# Patient Record
Sex: FEMALE | Race: WHITE | NOT HISPANIC OR LATINO | Employment: PART TIME | URBAN - METROPOLITAN AREA
[De-identification: names, ages, dates, MRNs, and addresses within clinical notes are randomized per-mention and may not be internally consistent; named-entity substitution may affect disease eponyms.]

---

## 2021-08-09 ENCOUNTER — OFFICE VISIT (OUTPATIENT)
Dept: INTERNAL MEDICINE | Facility: CLINIC | Age: 51
End: 2021-08-09

## 2021-08-09 ENCOUNTER — LAB (OUTPATIENT)
Dept: LAB | Facility: HOSPITAL | Age: 51
End: 2021-08-09

## 2021-08-09 ENCOUNTER — TELEPHONE (OUTPATIENT)
Dept: INTERNAL MEDICINE | Facility: CLINIC | Age: 51
End: 2021-08-09

## 2021-08-09 VITALS
BODY MASS INDEX: 34.55 KG/M2 | HEIGHT: 61 IN | SYSTOLIC BLOOD PRESSURE: 114 MMHG | HEART RATE: 73 BPM | WEIGHT: 183 LBS | RESPIRATION RATE: 14 BRPM | DIASTOLIC BLOOD PRESSURE: 72 MMHG | OXYGEN SATURATION: 99 %

## 2021-08-09 DIAGNOSIS — Z13.89 ENCOUNTER FOR SCREENING FOR OTHER DISORDER: ICD-10-CM

## 2021-08-09 DIAGNOSIS — D50.8 OTHER IRON DEFICIENCY ANEMIA: ICD-10-CM

## 2021-08-09 DIAGNOSIS — E66.9 OBESITY (BMI 30-39.9): ICD-10-CM

## 2021-08-09 DIAGNOSIS — Z13.220 ENCOUNTER FOR SCREENING FOR LIPID DISORDER: ICD-10-CM

## 2021-08-09 DIAGNOSIS — E53.8 B12 DEFICIENCY: ICD-10-CM

## 2021-08-09 DIAGNOSIS — Z00.00 HEALTHCARE MAINTENANCE: Primary | ICD-10-CM

## 2021-08-09 DIAGNOSIS — Z00.00 HEALTHCARE MAINTENANCE: ICD-10-CM

## 2021-08-09 LAB
25(OH)D3 SERPL-MCNC: 31.2 NG/ML (ref 30–100)
ALBUMIN SERPL-MCNC: 4.1 G/DL (ref 3.5–5.2)
ALBUMIN/GLOB SERPL: 2.1 G/DL
ALP SERPL-CCNC: 48 U/L (ref 39–117)
ALT SERPL W P-5'-P-CCNC: 5 U/L (ref 1–33)
ANION GAP SERPL CALCULATED.3IONS-SCNC: 8.3 MMOL/L (ref 5–15)
AST SERPL-CCNC: 14 U/L (ref 1–32)
BACTERIA UR QL AUTO: ABNORMAL /HPF
BILIRUB SERPL-MCNC: 0.4 MG/DL (ref 0–1.2)
BILIRUB UR QL STRIP: NEGATIVE
BUN SERPL-MCNC: 12 MG/DL (ref 6–20)
BUN/CREAT SERPL: 17.6 (ref 7–25)
CALCIUM SPEC-SCNC: 8.8 MG/DL (ref 8.6–10.5)
CHLORIDE SERPL-SCNC: 107 MMOL/L (ref 98–107)
CHOLEST SERPL-MCNC: 202 MG/DL (ref 0–200)
CLARITY UR: CLEAR
CO2 SERPL-SCNC: 24.7 MMOL/L (ref 22–29)
COLOR UR: YELLOW
CREAT SERPL-MCNC: 0.68 MG/DL (ref 0.57–1)
DEPRECATED RDW RBC AUTO: 41.5 FL (ref 37–54)
ERYTHROCYTE [DISTWIDTH] IN BLOOD BY AUTOMATED COUNT: 13 % (ref 12.3–15.4)
FERRITIN SERPL-MCNC: 10.1 NG/ML (ref 13–150)
FOLATE SERPL-MCNC: 4.98 NG/ML (ref 4.78–24.2)
GFR SERPL CREATININE-BSD FRML MDRD: 91 ML/MIN/1.73
GLOBULIN UR ELPH-MCNC: 2 GM/DL
GLUCOSE SERPL-MCNC: 94 MG/DL (ref 65–99)
GLUCOSE UR STRIP-MCNC: NEGATIVE MG/DL
HBA1C MFR BLD: 4.96 % (ref 4.8–5.6)
HCT VFR BLD AUTO: 37.3 % (ref 34–46.6)
HCV AB SER DONR QL: NORMAL
HDLC SERPL QL: 2.89
HDLC SERPL-MCNC: 70 MG/DL (ref 40–60)
HGB BLD-MCNC: 12.2 G/DL (ref 12–15.9)
HGB UR QL STRIP.AUTO: NEGATIVE
HYALINE CASTS UR QL AUTO: ABNORMAL /LPF
IRON 24H UR-MRATE: 69 MCG/DL (ref 37–145)
IRON SATN MFR SERPL: 15 % (ref 20–50)
KETONES UR QL STRIP: NEGATIVE
LDLC SERPL CALC-MCNC: 123 MG/DL (ref 0–100)
LEUKOCYTE ESTERASE UR QL STRIP.AUTO: ABNORMAL
MCH RBC QN AUTO: 29 PG (ref 26.6–33)
MCHC RBC AUTO-ENTMCNC: 32.7 G/DL (ref 31.5–35.7)
MCV RBC AUTO: 88.6 FL (ref 79–97)
NITRITE UR QL STRIP: NEGATIVE
PH UR STRIP.AUTO: 6 [PH] (ref 5–8)
PLATELET # BLD AUTO: 318 10*3/MM3 (ref 140–450)
PMV BLD AUTO: 10.4 FL (ref 6–12)
POTASSIUM SERPL-SCNC: 4.5 MMOL/L (ref 3.5–5.2)
PROT SERPL-MCNC: 6.1 G/DL (ref 6–8.5)
PROT UR QL STRIP: NEGATIVE
RBC # BLD AUTO: 4.21 10*6/MM3 (ref 3.77–5.28)
RBC # UR: ABNORMAL /HPF
REF LAB TEST METHOD: ABNORMAL
SODIUM SERPL-SCNC: 140 MMOL/L (ref 136–145)
SP GR UR STRIP: 1.01 (ref 1–1.03)
SQUAMOUS #/AREA URNS HPF: ABNORMAL /HPF
TIBC SERPL-MCNC: 466 MCG/DL (ref 298–536)
TRANSFERRIN SERPL-MCNC: 313 MG/DL (ref 200–360)
TRIGL SERPL-MCNC: 47 MG/DL (ref 0–150)
TSH SERPL DL<=0.05 MIU/L-ACNC: 2.27 UIU/ML (ref 0.27–4.2)
UROBILINOGEN UR QL STRIP: ABNORMAL
VIT B12 BLD-MCNC: 550 PG/ML (ref 211–946)
VLDLC SERPL-MCNC: 9 MG/DL (ref 5–40)
WBC # BLD AUTO: 6.5 10*3/MM3 (ref 3.4–10.8)
WBC UR QL AUTO: ABNORMAL /HPF

## 2021-08-09 PROCEDURE — 80061 LIPID PANEL: CPT | Performed by: FAMILY MEDICINE

## 2021-08-09 PROCEDURE — 81001 URINALYSIS AUTO W/SCOPE: CPT

## 2021-08-09 PROCEDURE — 84443 ASSAY THYROID STIM HORMONE: CPT | Performed by: FAMILY MEDICINE

## 2021-08-09 PROCEDURE — 82306 VITAMIN D 25 HYDROXY: CPT | Performed by: FAMILY MEDICINE

## 2021-08-09 PROCEDURE — 82728 ASSAY OF FERRITIN: CPT

## 2021-08-09 PROCEDURE — 84466 ASSAY OF TRANSFERRIN: CPT | Performed by: FAMILY MEDICINE

## 2021-08-09 PROCEDURE — 85027 COMPLETE CBC AUTOMATED: CPT | Performed by: FAMILY MEDICINE

## 2021-08-09 PROCEDURE — 82607 VITAMIN B-12: CPT | Performed by: FAMILY MEDICINE

## 2021-08-09 PROCEDURE — 80053 COMPREHEN METABOLIC PANEL: CPT | Performed by: FAMILY MEDICINE

## 2021-08-09 PROCEDURE — 82746 ASSAY OF FOLIC ACID SERUM: CPT | Performed by: FAMILY MEDICINE

## 2021-08-09 PROCEDURE — 83036 HEMOGLOBIN GLYCOSYLATED A1C: CPT | Performed by: FAMILY MEDICINE

## 2021-08-09 PROCEDURE — 83540 ASSAY OF IRON: CPT | Performed by: FAMILY MEDICINE

## 2021-08-09 PROCEDURE — 99386 PREV VISIT NEW AGE 40-64: CPT | Performed by: FAMILY MEDICINE

## 2021-08-09 PROCEDURE — 86803 HEPATITIS C AB TEST: CPT | Performed by: FAMILY MEDICINE

## 2021-08-09 PROCEDURE — 36415 COLL VENOUS BLD VENIPUNCTURE: CPT | Performed by: FAMILY MEDICINE

## 2021-08-09 RX ORDER — DIPHENOXYLATE HYDROCHLORIDE AND ATROPINE SULFATE 2.5; .025 MG/1; MG/1
TABLET ORAL
COMMUNITY

## 2021-08-09 RX ORDER — CYANOCOBALAMIN 1000 UG/ML
1000 INJECTION, SOLUTION INTRAMUSCULAR; SUBCUTANEOUS WEEKLY
Qty: 51 ML | Refills: 0 | Status: SHIPPED | OUTPATIENT
Start: 2021-08-09 | End: 2022-05-16

## 2021-08-09 RX ORDER — DOXYCYCLINE HYCLATE 50 MG/1
324 CAPSULE, GELATIN COATED ORAL
Qty: 90 TABLET | Refills: 1 | Status: SHIPPED | OUTPATIENT
Start: 2021-08-09

## 2021-08-09 RX ORDER — DOXYCYCLINE HYCLATE 50 MG/1
324 CAPSULE, GELATIN COATED ORAL
Qty: 90 TABLET | Refills: 4 | Status: SHIPPED | OUTPATIENT
Start: 2021-08-09 | End: 2021-08-09 | Stop reason: SDUPTHER

## 2021-08-09 RX ORDER — MECLIZINE HYDROCHLORIDE 25 MG/1
25 TABLET ORAL 3 TIMES DAILY PRN
COMMUNITY
End: 2022-05-16

## 2021-08-09 RX ORDER — CYANOCOBALAMIN 1000 UG/ML
1000 INJECTION, SOLUTION INTRAMUSCULAR; SUBCUTANEOUS WEEKLY
Qty: 51 ML | Refills: 0 | Status: SHIPPED | OUTPATIENT
Start: 2021-08-09 | End: 2021-08-09 | Stop reason: SDUPTHER

## 2021-08-09 NOTE — TELEPHONE ENCOUNTER
PATIENT STATES THAT SHE GAVE THE INCORRECT PHARMACY AT HE VISIT AND NEEDS THE MEDICATIONS THAT WERE SENT  IN TODAY TO GO TO       [id number] - [place], [place] - [address] AT [facility] - [phone] [facility] [phone]   [phone]

## 2021-08-09 NOTE — PROGRESS NOTES
08/09/2021    Patient Information  Kiera Avalos                                                                                          [address] [facility] [address]      Chief Complaint:   Chief Complaint   Patient presents with   • Annual Exam          History of Present Illness:  Pt is here for obesity. She has hx of gastric bypass. reports she has been unsuccessful loosing any weight this past year despite exercise/diet. She has gained 30lbs this past year.   Iron def--pt not taking her iron supplements/vit c. Has previously needed iron transfusions since her gastric bypass. Also has been b12 def since surgery, not on b12 supplements/injections.  Pt not requesting refills/side effects. No further concerns/complaints.    Review of Systems   Constitutional: Positive for malaise/fatigue and weight gain.   HENT: Negative.    Eyes: Negative.    Cardiovascular: Negative.    Respiratory: Negative.    Endocrine: Negative.    Hematologic/Lymphatic: Negative.    Skin: Negative.    Musculoskeletal: Negative.    Gastrointestinal: Negative.    Genitourinary: Negative.    Neurological: Negative.    Psychiatric/Behavioral: Negative.    Allergic/Immunologic: Negative.        Active Problems:    There is no problem list on file for this patient.        Past Medical History:   Diagnosis Date   • Anemia          No past surgical history on file.      Allergies   Allergen Reactions   • Penicillins Hives   • Sulfa Antibiotics Rash           Current Outpatient Medications:   •  esomeprazole (nexIUM) 20 MG capsule, Take 20 mg by mouth Every Morning Before Breakfast., Disp: , Rfl:   •  meclizine (ANTIVERT) 25 MG tablet, Take 25 mg by mouth 3 (Three) Times a Day As Needed for Dizziness., Disp: , Rfl:   •  multivitamin (THERAGRAN) tablet tablet, Take  by mouth., Disp: , Rfl:       No family history on file.      Social History     Socioeconomic History   • Marital status:      Spouse name: Not on file   •  "Number of children: Not on file   • Years of education: Not on file   • Highest education level: Not on file   Tobacco Use   • Smoking status: Never Smoker   • Smokeless tobacco: Never Used   Vaping Use   • Vaping Use: Never used   Substance and Sexual Activity   • Alcohol use: Yes   • Drug use: Never   • Sexual activity: Yes         Physical Exam  Constitutional:       Appearance: Normal appearance. She is obese.   Cardiovascular:      Rate and Rhythm: Normal rate and regular rhythm.      Pulses: Normal pulses.      Heart sounds: Normal heart sounds.   Pulmonary:      Effort: Pulmonary effort is normal.      Breath sounds: Normal breath sounds.   Abdominal:      General: Abdomen is flat. Bowel sounds are normal. There is no distension.      Palpations: Abdomen is soft. There is no mass.      Tenderness: There is no abdominal tenderness.   Skin:     General: Skin is warm.      Findings: No rash.   Neurological:      General: No focal deficit present.      Mental Status: She is alert and oriented to person, place, and time. Mental status is at baseline.   Psychiatric:         Mood and Affect: Mood normal.         Behavior: Behavior normal.         Thought Content: Thought content normal.         Judgment: Judgment normal.          Vitals:    08/09/21 0846   BP: 114/72   Pulse: 73   Resp: 14   SpO2: 99%   Weight: 83 kg (183 lb)   Height: 154.9 cm (61\")       Body mass index is 34.58 kg/m².       Lab/other results:        Assessment/Plan:    Diagnoses and all orders for this visit:    1. Healthcare maintenance (Primary)  -     CBC (No Diff)  -     Comprehensive Metabolic Panel  -     Hemoglobin A1c  -     Lipid Panel With / Chol / HDL Ratio  -     Hepatitis C Antibody  -     TSH  -     Vitamin B12  -     Folate  -     Vitamin D 25 Hydroxy  -     Urinalysis With Culture If Indicated -; Future  -     Ambulatory Referral to Gastroenterology    2. Encounter for screening for lipid disorder  -     Lipid Panel With / Chol / " HDL Ratio    3. Encounter for screening for other disorder  -     CBC (No Diff)  -     Comprehensive Metabolic Panel  -     Hemoglobin A1c  -     Lipid Panel With / Chol / HDL Ratio  -     Hepatitis C Antibody  -     TSH  -     Vitamin B12  -     Folate  -     Vitamin D 25 Hydroxy  -     Urinalysis With Culture If Indicated -; Future  -     Ambulatory Referral to Gastroenterology    4. Obesity (BMI 30-39.9)    5. Other iron deficiency anemia  -     Iron Profile  -     Ferritin; Future  -     ferrous gluconate (FERGON) 324 MG tablet; Take 1 tablet by mouth Daily With Breakfast.  Dispense: 90 tablet; Refill: 4    6. B12 deficiency  -     cyanocobalamin 1000 MCG/ML injection; Inject 1 mL into the appropriate muscle as directed by prescriber 1 (One) Time Per Week.  Dispense: 51 mL; Refill: 0    hm--refusing vaccines , mammo/pap smear utd, next mammo due next yr, pap smear due in 2022. F/u labs, f/u gi for colonoscopy. F/u in 1 yr routine annual visit.    iron def--ferrous gluconate , vit c, fiber + stool softenenrs for any constipation, b12 oral + IM (if insurance covers). monitor labs. If not appropriately improving after 1 month will refer to Heme for possible infusions. F/u in 1 month.    obesity--counseled on healthy diet/exercise. counseling on phentermine given to pt. She is interested but will confirm with insurance if this is covered. Monitor weight.              Procedures

## 2021-08-10 RX ORDER — ERGOCALCIFEROL 1.25 MG/1
50000 CAPSULE ORAL WEEKLY
Qty: 12 CAPSULE | Refills: 0 | Status: SHIPPED | OUTPATIENT
Start: 2021-08-10 | End: 2022-05-16

## 2021-08-23 ENCOUNTER — TELEPHONE (OUTPATIENT)
Dept: GASTROENTEROLOGY | Facility: CLINIC | Age: 51
End: 2021-08-23

## 2022-05-16 ENCOUNTER — OFFICE VISIT (OUTPATIENT)
Dept: INTERNAL MEDICINE | Facility: CLINIC | Age: 52
End: 2022-05-16

## 2022-05-16 ENCOUNTER — LAB (OUTPATIENT)
Dept: LAB | Facility: HOSPITAL | Age: 52
End: 2022-05-16

## 2022-05-16 VITALS
HEART RATE: 64 BPM | WEIGHT: 176.8 LBS | BODY MASS INDEX: 33.38 KG/M2 | HEIGHT: 61 IN | DIASTOLIC BLOOD PRESSURE: 82 MMHG | TEMPERATURE: 98.3 F | SYSTOLIC BLOOD PRESSURE: 130 MMHG | OXYGEN SATURATION: 98 %

## 2022-05-16 DIAGNOSIS — R00.2 INTERMITTENT PALPITATIONS: ICD-10-CM

## 2022-05-16 DIAGNOSIS — Z01.419 ENCOUNTER FOR WELL WOMAN EXAM WITH ROUTINE GYNECOLOGICAL EXAM: ICD-10-CM

## 2022-05-16 DIAGNOSIS — E53.8 VITAMIN B12 DEFICIENCY: ICD-10-CM

## 2022-05-16 DIAGNOSIS — Z12.11 SCREEN FOR COLON CANCER: ICD-10-CM

## 2022-05-16 DIAGNOSIS — D17.1 LIPOMA OF BACK: ICD-10-CM

## 2022-05-16 DIAGNOSIS — E55.9 VITAMIN D DEFICIENCY: ICD-10-CM

## 2022-05-16 DIAGNOSIS — D50.9 IRON DEFICIENCY ANEMIA, UNSPECIFIED IRON DEFICIENCY ANEMIA TYPE: ICD-10-CM

## 2022-05-16 DIAGNOSIS — M25.50 ARTHRALGIA OF MULTIPLE JOINTS: ICD-10-CM

## 2022-05-16 DIAGNOSIS — Z80.49 FAMILY HISTORY OF CERVICAL CANCER: ICD-10-CM

## 2022-05-16 DIAGNOSIS — Z82.61 FAMILY HISTORY OF RHEUMATOID ARTHRITIS: ICD-10-CM

## 2022-05-16 DIAGNOSIS — M79.89 BILATERAL HAND SWELLING: ICD-10-CM

## 2022-05-16 DIAGNOSIS — R60.0 BILATERAL LEG EDEMA: Primary | ICD-10-CM

## 2022-05-16 DIAGNOSIS — K59.09 CHRONIC CONSTIPATION: ICD-10-CM

## 2022-05-16 DIAGNOSIS — I83.893 VARICOSE VEINS OF BOTH LEGS WITH EDEMA: ICD-10-CM

## 2022-05-16 DIAGNOSIS — Z98.84 HISTORY OF BARIATRIC SURGERY: ICD-10-CM

## 2022-05-16 PROBLEM — R56.9 SEIZURE: Status: ACTIVE | Noted: 2020-02-20

## 2022-05-16 PROBLEM — K21.9 GASTROESOPHAGEAL REFLUX DISEASE: Status: ACTIVE | Noted: 2020-02-20

## 2022-05-16 LAB
25(OH)D3 SERPL-MCNC: 47.9 NG/ML (ref 30–100)
ALBUMIN SERPL-MCNC: 4.4 G/DL (ref 3.5–5.2)
ALBUMIN/GLOB SERPL: 1.8 G/DL
ALP SERPL-CCNC: 68 U/L (ref 39–117)
ALT SERPL W P-5'-P-CCNC: 9 U/L (ref 1–33)
ANION GAP SERPL CALCULATED.3IONS-SCNC: 7.8 MMOL/L (ref 5–15)
AST SERPL-CCNC: 16 U/L (ref 1–32)
BASOPHILS # BLD AUTO: 0.06 10*3/MM3 (ref 0–0.2)
BASOPHILS NFR BLD AUTO: 1.1 % (ref 0–1.5)
BILIRUB SERPL-MCNC: 0.5 MG/DL (ref 0–1.2)
BUN SERPL-MCNC: 16 MG/DL (ref 6–20)
BUN/CREAT SERPL: 21.3 (ref 7–25)
CALCIUM SPEC-SCNC: 9.5 MG/DL (ref 8.6–10.5)
CHLORIDE SERPL-SCNC: 102 MMOL/L (ref 98–107)
CHOLEST SERPL-MCNC: 227 MG/DL (ref 0–200)
CHROMATIN AB SERPL-ACNC: <10 IU/ML (ref 0–14)
CO2 SERPL-SCNC: 26.2 MMOL/L (ref 22–29)
CREAT SERPL-MCNC: 0.75 MG/DL (ref 0.57–1)
CRP SERPL-MCNC: <0.3 MG/DL (ref 0–0.5)
DEPRECATED RDW RBC AUTO: 46.6 FL (ref 37–54)
EGFRCR SERPLBLD CKD-EPI 2021: 95.3 ML/MIN/1.73
EOSINOPHIL # BLD AUTO: 0.06 10*3/MM3 (ref 0–0.4)
EOSINOPHIL NFR BLD AUTO: 1.1 % (ref 0.3–6.2)
ERYTHROCYTE [DISTWIDTH] IN BLOOD BY AUTOMATED COUNT: 12.6 % (ref 12.3–15.4)
FERRITIN SERPL-MCNC: 23.4 NG/ML (ref 13–150)
FOLATE SERPL-MCNC: 10.9 NG/ML (ref 4.78–24.2)
GLOBULIN UR ELPH-MCNC: 2.5 GM/DL
GLUCOSE SERPL-MCNC: 94 MG/DL (ref 65–99)
HCT VFR BLD AUTO: 46.3 % (ref 34–46.6)
HDLC SERPL-MCNC: 73 MG/DL (ref 40–60)
HGB BLD-MCNC: 14.1 G/DL (ref 12–15.9)
IMM GRANULOCYTES # BLD AUTO: 0.01 10*3/MM3 (ref 0–0.05)
IMM GRANULOCYTES NFR BLD AUTO: 0.2 % (ref 0–0.5)
IRON 24H UR-MRATE: 128 MCG/DL (ref 37–145)
IRON SATN MFR SERPL: 27 % (ref 20–50)
LDLC SERPL CALC-MCNC: 141 MG/DL (ref 0–100)
LDLC/HDLC SERPL: 1.9 {RATIO}
LYMPHOCYTES # BLD AUTO: 1.99 10*3/MM3 (ref 0.7–3.1)
LYMPHOCYTES NFR BLD AUTO: 37.5 % (ref 19.6–45.3)
MAGNESIUM SERPL-MCNC: 2.1 MG/DL (ref 1.6–2.6)
MCH RBC QN AUTO: 30.5 PG (ref 26.6–33)
MCHC RBC AUTO-ENTMCNC: 30.5 G/DL (ref 31.5–35.7)
MCV RBC AUTO: 100 FL (ref 79–97)
MONOCYTES # BLD AUTO: 0.59 10*3/MM3 (ref 0.1–0.9)
MONOCYTES NFR BLD AUTO: 11.1 % (ref 5–12)
NEUTROPHILS NFR BLD AUTO: 2.59 10*3/MM3 (ref 1.7–7)
NEUTROPHILS NFR BLD AUTO: 49 % (ref 42.7–76)
NRBC BLD AUTO-RTO: 0 /100 WBC (ref 0–0.2)
NT-PROBNP SERPL-MCNC: 118 PG/ML (ref 0–900)
PLATELET # BLD AUTO: 278 10*3/MM3 (ref 140–450)
PMV BLD AUTO: 9.8 FL (ref 6–12)
POTASSIUM SERPL-SCNC: 4.2 MMOL/L (ref 3.5–5.2)
PROT SERPL-MCNC: 6.9 G/DL (ref 6–8.5)
RBC # BLD AUTO: 4.63 10*6/MM3 (ref 3.77–5.28)
SODIUM SERPL-SCNC: 136 MMOL/L (ref 136–145)
TIBC SERPL-MCNC: 468 MCG/DL (ref 298–536)
TRANSFERRIN SERPL-MCNC: 314 MG/DL (ref 200–360)
TRIGL SERPL-MCNC: 76 MG/DL (ref 0–150)
TSH SERPL DL<=0.05 MIU/L-ACNC: 1.58 UIU/ML (ref 0.27–4.2)
VIT B12 BLD-MCNC: 721 PG/ML (ref 211–946)
VLDLC SERPL-MCNC: 13 MG/DL (ref 5–40)
WBC NRBC COR # BLD: 5.3 10*3/MM3 (ref 3.4–10.8)

## 2022-05-16 PROCEDURE — 86140 C-REACTIVE PROTEIN: CPT | Performed by: NURSE PRACTITIONER

## 2022-05-16 PROCEDURE — 82306 VITAMIN D 25 HYDROXY: CPT | Performed by: NURSE PRACTITIONER

## 2022-05-16 PROCEDURE — 82607 VITAMIN B-12: CPT | Performed by: NURSE PRACTITIONER

## 2022-05-16 PROCEDURE — 82728 ASSAY OF FERRITIN: CPT | Performed by: NURSE PRACTITIONER

## 2022-05-16 PROCEDURE — 86431 RHEUMATOID FACTOR QUANT: CPT | Performed by: NURSE PRACTITIONER

## 2022-05-16 PROCEDURE — 86038 ANTINUCLEAR ANTIBODIES: CPT | Performed by: NURSE PRACTITIONER

## 2022-05-16 PROCEDURE — 83540 ASSAY OF IRON: CPT | Performed by: NURSE PRACTITIONER

## 2022-05-16 PROCEDURE — 83735 ASSAY OF MAGNESIUM: CPT | Performed by: NURSE PRACTITIONER

## 2022-05-16 PROCEDURE — 36415 COLL VENOUS BLD VENIPUNCTURE: CPT | Performed by: NURSE PRACTITIONER

## 2022-05-16 PROCEDURE — 99214 OFFICE O/P EST MOD 30 MIN: CPT | Performed by: NURSE PRACTITIONER

## 2022-05-16 PROCEDURE — 80050 GENERAL HEALTH PANEL: CPT | Performed by: NURSE PRACTITIONER

## 2022-05-16 PROCEDURE — 84466 ASSAY OF TRANSFERRIN: CPT | Performed by: NURSE PRACTITIONER

## 2022-05-16 PROCEDURE — 83880 ASSAY OF NATRIURETIC PEPTIDE: CPT | Performed by: NURSE PRACTITIONER

## 2022-05-16 PROCEDURE — 80061 LIPID PANEL: CPT | Performed by: NURSE PRACTITIONER

## 2022-05-16 PROCEDURE — 93000 ELECTROCARDIOGRAM COMPLETE: CPT | Performed by: NURSE PRACTITIONER

## 2022-05-16 PROCEDURE — 82746 ASSAY OF FOLIC ACID SERUM: CPT | Performed by: NURSE PRACTITIONER

## 2022-05-16 NOTE — PROGRESS NOTES
Subjective   Kiera Avalos is a 51 y.o. female.     Chief Complaint   Patient presents with   • Leg Swelling     Pt is here as a new pt to est care. Pt c/o bilateral leg swelling X 2 months.   • Weight Gain   • Menstrual Problem   • Hot Flashes        History of Present Illness   She is here today as a new patient to establish care. She tries to stay active daily and eat a healthy diet.   Previous PCP Dr. Bethea with [facility].  She noticed over 6 months ago a round area of swelling at the left upper back.  It is slightly tender to the touch.  She notices after getting a massage.  It has not changed in size.    Bilateral LE swelling- she has had baseline ankle and leg swelling for several years. It has become worse over the past few months. Right leg is worse than right. She has had a 25 pound weight gain over the past 2 years.  Denies any change in salt intake. She has been trying to elevate the legs with mild improvement in swelling.  She notes occasional discomfort in bilateral shins. She has heavy sensation in the legs with prolonged walking.  Denies any pain with ambulation.  She is sedentary for work with legs dependent.  She has occasional leg cramps.  She is taking an OTC diuretic without improvement in swelling. No hx of blood clots. Positive hx of spider veins in bilateral legs. She has also noticed that her face has become swollen and her bilateral hands are more swollen, worse in the morning with associated joint pain. She also has joint pain and swelling in bilateral elbows.  She is trying to hydrate well with fluids and elevating the legs few times a day with some improvement in swelling.  She does note intermittent palpitations for the past several months along with mild shortness of breath with activity.  Denies any chest pain, syncope, PND, orthopnea, confusion.    Hx of bariatric surgery- in 2011 with gastric sleeve. She had 100 lb weight loss with maintenance. She has gained 25 lbs over the past few  years.  GERD- she uses Nexium 20 mg daily. She is well controlled with this.   B12 def-last level checked in August of last year stable in the 500s.  Iron def anemia- she was having iron infusions when she lived in [place]. She then moved to [place] and had anemia, requiring iron infusions. She is currently taking ferrous gluconate daily.  She has not had her levels checked since August of last year.  Vit D def- she is not currently taking vit D. She completed prescription vit D2.     Hx of seizures- she weaned off of anti-epileptic medication, Lamictal over a year ago. She has been seizure free for over a year. She uses CBD oil. She notes occasional aura when she is tired and overworked. She will rest with improvement.      Colon cancer screening- hx of constipation. She uses Miralax daily. She has never had a c-scope.  Denies any change in bowel habits, change in stool caliber, BRBPR, melena, abdominal pain.    GYN- . C/s. Hx of bilateral LE swelling with pregnancy. She has been having menstrual irregularities and hot flashes along with weight gain. Last pap completed in [place] in 2019. Last mammogram completed in  with [facility]. LMP 2022. She has been having irregular periods for the past few years.  Positive family history of mother with cervical cancer.    She is  with a son who is 25.  She moved here from [place] few years ago.    The following portions of the patient's history were reviewed and updated as appropriate: allergies, current medications, past family history, past medical history, past social history, past surgical history and problem list.    Review of Systems   Constitutional: Positive for fatigue and unexpected weight gain. Negative for chills and fever.   Respiratory: Positive for shortness of breath (mild with activity). Negative for cough, chest tightness and wheezing.    Cardiovascular: Positive for palpitations and leg swelling. Negative for chest pain.   Genitourinary:  Positive for menstrual problem.   Musculoskeletal: Positive for arthralgias (hands, elbows) and joint swelling.   Neurological: Negative.    Psychiatric/Behavioral: Negative for suicidal ideas and depressed mood. The patient is not nervous/anxious.        Objective   Physical Exam  Constitutional:       Appearance: She is well-developed.   Neck:      Thyroid: No thyroid mass, thyromegaly or thyroid tenderness.      Vascular: No carotid bruit.      Trachea: Trachea normal.   Cardiovascular:      Rate and Rhythm: Normal rate and regular rhythm.      Chest Wall: PMI is not displaced.      Pulses:           Radial pulses are 2+ on the right side and 2+ on the left side.        Dorsalis pedis pulses are 2+ on the right side and 2+ on the left side.        Posterior tibial pulses are 2+ on the right side and 2+ on the left side.      Heart sounds: S1 normal and S2 normal.      Comments: Negative Homans sign bilaterally.  No warmth or erythema.  Varicose veins and spider veins present bilateral lower extremities.   Pulmonary:      Effort: Pulmonary effort is normal.      Breath sounds: Normal breath sounds.   Musculoskeletal:      Right hand: Swelling present. No deformity, lacerations, tenderness or bony tenderness. Normal range of motion. Normal strength. Normal sensation. Normal capillary refill. Normal pulse.      Left hand: Swelling present. No deformity, lacerations, tenderness or bony tenderness. Normal range of motion. Normal strength. Normal sensation. Normal capillary refill. Normal pulse.      Right lower le+ Edema present.      Left lower le+ Edema present.   Lymphadenopathy:      Head:      Right side of head: No submental, submandibular, tonsillar or occipital adenopathy.      Left side of head: No submental, submandibular, tonsillar or occipital adenopathy.      Cervical: No cervical adenopathy.   Skin:     General: Skin is warm and dry.      Capillary Refill: Capillary refill takes less than 2  seconds.      Nails: There is no clubbing.             Comments: > 1 cm round, fluctuant mass at left upper back, soft, slightly tender.    Neurological:      Mental Status: She is alert and oriented to person, place, and time.   Psychiatric:         Attention and Perception: Attention normal.         Mood and Affect: Mood and affect normal.         Speech: Speech normal.         Behavior: Behavior normal.         Thought Content: Thought content normal.         Cognition and Memory: Cognition normal.         Vitals:    05/16/22 1116   BP: 130/82   Pulse: 64   Temp: 98.3 °F (36.8 °C)   SpO2: 98%      Body mass index is 33.41 kg/m².    Assessment & Plan   Diagnoses and all orders for this visit:    1. Bilateral leg edema (Primary)  -     CBC & Differential  -     Comprehensive Metabolic Panel  -     Lipid Panel With LDL / HDL Ratio  -     TSH Rfx On Abnormal To Free T4  -     Vitamin D 25 Hydroxy  -     Vitamin B12  -     Ferritin  -     Folate  -     BNP  -     Iron Profile  -     Rheumatoid Factor, Quant  -     GEETA  -     C-reactive protein  -     Magnesium  -     Duplex Venous Lower Extremity - Bilateral CAR    2. Varicose veins of both legs with edema  -     CBC & Differential  -     Comprehensive Metabolic Panel  -     Lipid Panel With LDL / HDL Ratio  -     TSH Rfx On Abnormal To Free T4  -     Vitamin D 25 Hydroxy  -     Vitamin B12  -     Ferritin  -     Folate  -     BNP  -     Iron Profile  -     Rheumatoid Factor, Quant  -     GEETA  -     C-reactive protein  -     Magnesium  -     Duplex Venous Lower Extremity - Bilateral CAR    3. Bilateral hand swelling  -     CBC & Differential  -     Comprehensive Metabolic Panel  -     Lipid Panel With LDL / HDL Ratio  -     TSH Rfx On Abnormal To Free T4  -     Vitamin D 25 Hydroxy  -     Vitamin B12  -     Ferritin  -     Folate  -     BNP  -     Iron Profile  -     Rheumatoid Factor, Quant  -     GEETA  -     C-reactive protein  -     Magnesium    4. Arthralgia of  multiple joints  -     CBC & Differential  -     Comprehensive Metabolic Panel  -     Lipid Panel With LDL / HDL Ratio  -     TSH Rfx On Abnormal To Free T4  -     Vitamin D 25 Hydroxy  -     Vitamin B12  -     Ferritin  -     Folate  -     BNP  -     Iron Profile  -     Rheumatoid Factor, Quant  -     GEETA  -     C-reactive protein  -     Magnesium    5. Intermittent palpitations  -     CBC & Differential  -     Comprehensive Metabolic Panel  -     Lipid Panel With LDL / HDL Ratio  -     TSH Rfx On Abnormal To Free T4  -     Vitamin D 25 Hydroxy  -     Vitamin B12  -     Ferritin  -     Folate  -     BNP  -     Iron Profile  -     Rheumatoid Factor, Quant  -     GEETA  -     C-reactive protein  -     Magnesium    6. Family history of rheumatoid arthritis  -     CBC & Differential  -     Comprehensive Metabolic Panel  -     Lipid Panel With LDL / HDL Ratio  -     TSH Rfx On Abnormal To Free T4  -     Vitamin D 25 Hydroxy  -     Vitamin B12  -     Ferritin  -     Folate  -     BNP  -     Iron Profile  -     Rheumatoid Factor, Quant  -     GEETA  -     C-reactive protein  -     Magnesium    7. Vitamin B12 deficiency  -     CBC & Differential  -     Comprehensive Metabolic Panel  -     Lipid Panel With LDL / HDL Ratio  -     TSH Rfx On Abnormal To Free T4  -     Vitamin D 25 Hydroxy  -     Vitamin B12  -     Ferritin  -     Folate  -     BNP  -     Iron Profile  -     Rheumatoid Factor, Quant  -     GEETA  -     C-reactive protein  -     Magnesium    8. History of bariatric surgery  -     CBC & Differential  -     Comprehensive Metabolic Panel  -     Lipid Panel With LDL / HDL Ratio  -     TSH Rfx On Abnormal To Free T4  -     Vitamin D 25 Hydroxy  -     Vitamin B12  -     Ferritin  -     Folate  -     BNP  -     Iron Profile  -     Rheumatoid Factor, Quant  -     GEETA  -     C-reactive protein  -     Magnesium    9. Vitamin D deficiency  -     CBC & Differential  -     Comprehensive Metabolic Panel  -     Lipid Panel With LDL  / HDL Ratio  -     TSH Rfx On Abnormal To Free T4  -     Vitamin D 25 Hydroxy  -     Vitamin B12  -     Ferritin  -     Folate  -     BNP  -     Iron Profile  -     Rheumatoid Factor, Quant  -     GEETA  -     C-reactive protein  -     Magnesium    10. Iron deficiency anemia, unspecified iron deficiency anemia type  -     CBC & Differential  -     Comprehensive Metabolic Panel  -     Lipid Panel With LDL / HDL Ratio  -     TSH Rfx On Abnormal To Free T4  -     Vitamin D 25 Hydroxy  -     Vitamin B12  -     Ferritin  -     Folate  -     BNP  -     Iron Profile  -     Rheumatoid Factor, Quant  -     GEETA  -     C-reactive protein  -     Magnesium    11. Lipoma of back  -     US Nonvascular Extremity Limited    12. Chronic constipation  -     Ambulatory Referral to Gastroenterology    13. Screen for colon cancer  -     Ambulatory Referral to Gastroenterology    14. Encounter for well woman exam with routine gynecological exam  -     Ambulatory Referral to Gynecology    15. Family history of cervical cancer  -     Ambulatory Referral to Gynecology    Other orders  -     ECG 12 Lead        ECG 12 Lead    Date/Time: 5/16/2022 12:20 PM  Performed by: Ginette Valle APRN  Authorized by: Ginette Valle APRN   Comparison: not compared with previous ECG   Previous ECG: no previous ECG available  Rhythm: sinus bradycardia  Rate: bradycardic  Conduction: conduction normal  ST Segments: ST segments normal  T Waves: T waves normal  QRS axis: normal  Other: no other findings    Clinical impression: normal ECG          1.  Bilateral leg edema with varicose veins-check venous duplex bilateral lower extremity imaging.  Check labs today.  Encouraged her to limit salt intake, elevate the legs several times a day, discussed using compression socks.  Pending ultrasound results would consider referral to vascular surgery for further evaluation and management.  2.  Intermittent palpitations-EKG today sinus bradycardia with rate of 56  bpm, no baseline for comparison.  Check labs today.  3.  Bilateral hand swelling/arthralgias of multiple joints- she does have a family history of rheumatoid arthritis and lupus.  Check rheumatoid factor, GEETA and CRP today.  4.  Iron deficiency anemia-check labs today.  5.  B12 deficiency-check labs today.  6.  Vitamin D deficiency-check labs today.  7.  Lipoma of back-appears to be a lipoma on exam today.  Check ultrasound for further evaluation.    Colon cancer screen- referral to GI  GYN- referral to GYN to establish.    Follow-up pending lab results and imaging studies.

## 2022-05-17 ENCOUNTER — TELEPHONE (OUTPATIENT)
Dept: ONCOLOGY | Facility: OTHER | Age: 52
End: 2022-05-17

## 2022-05-17 LAB — ANA SER QL: NEGATIVE

## 2022-05-18 PROBLEM — E78.00 PURE HYPERCHOLESTEROLEMIA: Status: ACTIVE | Noted: 2022-05-18

## 2022-05-24 ENCOUNTER — HOSPITAL ENCOUNTER (OUTPATIENT)
Dept: ULTRASOUND IMAGING | Facility: HOSPITAL | Age: 52
Discharge: HOME | End: 2022-05-24

## 2022-05-24 ENCOUNTER — HOSPITAL ENCOUNTER (OUTPATIENT)
Dept: CARDIOLOGY | Facility: HOSPITAL | Age: 52
Discharge: HOME | End: 2022-05-24

## 2022-05-24 LAB
BH CV LOW VAS LEFT COMMON FEMORAL SPONT: 1
BH CV LOW VAS RIGHT COMMON FEMORAL SPONT: 1
BH CV LOWER VASCULAR LEFT COMMON FEMORAL AUGMENT: NORMAL
BH CV LOWER VASCULAR LEFT COMMON FEMORAL COMPETENT: NORMAL
BH CV LOWER VASCULAR LEFT COMMON FEMORAL COMPRESS: NORMAL
BH CV LOWER VASCULAR LEFT COMMON FEMORAL PHASIC: NORMAL
BH CV LOWER VASCULAR LEFT COMMON FEMORAL SPONT: NORMAL
BH CV LOWER VASCULAR LEFT DISTAL FEMORAL COMPRESS: NORMAL
BH CV LOWER VASCULAR LEFT GASTRONEMIUS COMPRESS: NORMAL
BH CV LOWER VASCULAR LEFT GREATER SAPH AK COMPRESS: NORMAL
BH CV LOWER VASCULAR LEFT GREATER SAPH BK COMPRESS: NORMAL
BH CV LOWER VASCULAR LEFT LESSER SAPH COMPRESS: NORMAL
BH CV LOWER VASCULAR LEFT MID FEMORAL AUGMENT: NORMAL
BH CV LOWER VASCULAR LEFT MID FEMORAL COMPETENT: NORMAL
BH CV LOWER VASCULAR LEFT MID FEMORAL COMPRESS: NORMAL
BH CV LOWER VASCULAR LEFT MID FEMORAL PHASIC: NORMAL
BH CV LOWER VASCULAR LEFT MID FEMORAL SPONT: NORMAL
BH CV LOWER VASCULAR LEFT PERONEAL COMPRESS: NORMAL
BH CV LOWER VASCULAR LEFT POPLITEAL AUGMENT: NORMAL
BH CV LOWER VASCULAR LEFT POPLITEAL COMPETENT: NORMAL
BH CV LOWER VASCULAR LEFT POPLITEAL COMPRESS: NORMAL
BH CV LOWER VASCULAR LEFT POPLITEAL PHASIC: NORMAL
BH CV LOWER VASCULAR LEFT POPLITEAL SPONT: NORMAL
BH CV LOWER VASCULAR LEFT POSTERIOR TIBIAL COMPRESS: NORMAL
BH CV LOWER VASCULAR LEFT PROFUNDA FEMORAL AUGMENT: NORMAL
BH CV LOWER VASCULAR LEFT PROFUNDA FEMORAL COMPRESS: NORMAL
BH CV LOWER VASCULAR LEFT PROXIMAL FEMORAL COMPRESS: NORMAL
BH CV LOWER VASCULAR LEFT SAPHENOFEMORAL JUNCTION COMPRESS: NORMAL
BH CV LOWER VASCULAR LEFT SOLEAL COMPRESS: NORMAL
BH CV LOWER VASCULAR RIGHT COMMON FEMORAL AUGMENT: NORMAL
BH CV LOWER VASCULAR RIGHT COMMON FEMORAL COMPETENT: NORMAL
BH CV LOWER VASCULAR RIGHT COMMON FEMORAL COMPRESS: NORMAL
BH CV LOWER VASCULAR RIGHT COMMON FEMORAL PHASIC: NORMAL
BH CV LOWER VASCULAR RIGHT COMMON FEMORAL SPONT: NORMAL
BH CV LOWER VASCULAR RIGHT DISTAL FEMORAL COMPRESS: NORMAL
BH CV LOWER VASCULAR RIGHT GASTRONEMIUS COMPRESS: NORMAL
BH CV LOWER VASCULAR RIGHT GREATER SAPH AK COMPRESS: NORMAL
BH CV LOWER VASCULAR RIGHT GREATER SAPH BK COMPRESS: NORMAL
BH CV LOWER VASCULAR RIGHT LESSER SAPH COMPRESS: NORMAL
BH CV LOWER VASCULAR RIGHT MID FEMORAL AUGMENT: NORMAL
BH CV LOWER VASCULAR RIGHT MID FEMORAL COMPETENT: NORMAL
BH CV LOWER VASCULAR RIGHT MID FEMORAL COMPRESS: NORMAL
BH CV LOWER VASCULAR RIGHT MID FEMORAL PHASIC: NORMAL
BH CV LOWER VASCULAR RIGHT MID FEMORAL SPONT: NORMAL
BH CV LOWER VASCULAR RIGHT PERONEAL COMPRESS: NORMAL
BH CV LOWER VASCULAR RIGHT POPLITEAL AUGMENT: NORMAL
BH CV LOWER VASCULAR RIGHT POPLITEAL COMPETENT: NORMAL
BH CV LOWER VASCULAR RIGHT POPLITEAL COMPRESS: NORMAL
BH CV LOWER VASCULAR RIGHT POPLITEAL PHASIC: NORMAL
BH CV LOWER VASCULAR RIGHT POPLITEAL SPONT: NORMAL
BH CV LOWER VASCULAR RIGHT POSTERIOR TIBIAL COMPRESS: NORMAL
BH CV LOWER VASCULAR RIGHT PROFUNDA FEMORAL COMPRESS: NORMAL
BH CV LOWER VASCULAR RIGHT PROXIMAL FEMORAL COMPRESS: NORMAL
BH CV LOWER VASCULAR RIGHT SAPHENOFEMORAL JUNCTION COMPRESS: NORMAL
BH CV LOWER VASCULAR RIGHT SOLEAL COMPRESS: NORMAL
MAXIMAL PREDICTED HEART RATE: 167 BPM
STRESS TARGET HR: 142 BPM

## 2022-05-24 PROCEDURE — 76604 US EXAM CHEST: CPT

## 2022-05-24 PROCEDURE — 76882 US LMTD JT/FCL EVL NVASC XTR: CPT

## 2022-05-24 PROCEDURE — 93970 EXTREMITY STUDY: CPT

## 2022-05-25 DIAGNOSIS — M79.89 PAIN AND SWELLING OF LOWER LEG, UNSPECIFIED LATERALITY: ICD-10-CM

## 2022-05-25 DIAGNOSIS — M79.669 PAIN AND SWELLING OF LOWER LEG, UNSPECIFIED LATERALITY: ICD-10-CM

## 2022-05-25 DIAGNOSIS — I83.893 VARICOSE VEINS OF BOTH LEGS WITH EDEMA: Primary | ICD-10-CM

## 2022-05-25 DIAGNOSIS — I87.2 VENOUS INCOMPETENCE: ICD-10-CM

## 2022-05-25 DIAGNOSIS — D17.1 LIPOMA OF BACK: ICD-10-CM

## 2022-05-25 RX ORDER — HYDROCHLOROTHIAZIDE 12.5 MG/1
12.5 TABLET ORAL DAILY
Qty: 30 TABLET | Refills: 5 | Status: SHIPPED | OUTPATIENT
Start: 2022-05-25 | End: 2022-11-23

## 2022-06-06 ENCOUNTER — OFFICE VISIT (OUTPATIENT)
Dept: GASTROENTEROLOGY | Facility: CLINIC | Age: 52
End: 2022-06-06

## 2022-06-06 ENCOUNTER — PREP FOR SURGERY (OUTPATIENT)
Dept: SURGERY | Facility: SURGERY CENTER | Age: 52
End: 2022-06-06

## 2022-06-06 VITALS
WEIGHT: 175.2 LBS | HEIGHT: 61 IN | TEMPERATURE: 97.3 F | HEART RATE: 60 BPM | SYSTOLIC BLOOD PRESSURE: 132 MMHG | DIASTOLIC BLOOD PRESSURE: 80 MMHG | OXYGEN SATURATION: 98 % | BODY MASS INDEX: 33.08 KG/M2

## 2022-06-06 DIAGNOSIS — Z12.11 ENCOUNTER FOR SCREENING COLONOSCOPY: ICD-10-CM

## 2022-06-06 DIAGNOSIS — K21.9 GASTROESOPHAGEAL REFLUX DISEASE, UNSPECIFIED WHETHER ESOPHAGITIS PRESENT: Primary | ICD-10-CM

## 2022-06-06 DIAGNOSIS — Z90.3 H/O GASTRIC SLEEVE: ICD-10-CM

## 2022-06-06 DIAGNOSIS — K59.09 CHRONIC CONSTIPATION: ICD-10-CM

## 2022-06-06 PROCEDURE — 99204 OFFICE O/P NEW MOD 45 MIN: CPT | Performed by: INTERNAL MEDICINE

## 2022-06-06 RX ORDER — SODIUM CHLORIDE 0.9 % (FLUSH) 0.9 %
3 SYRINGE (ML) INJECTION EVERY 12 HOURS SCHEDULED
Status: CANCELLED | OUTPATIENT
Start: 2022-06-06

## 2022-06-06 RX ORDER — SODIUM CHLORIDE, SODIUM LACTATE, POTASSIUM CHLORIDE, CALCIUM CHLORIDE 600; 310; 30; 20 MG/100ML; MG/100ML; MG/100ML; MG/100ML
30 INJECTION, SOLUTION INTRAVENOUS CONTINUOUS PRN
Status: CANCELLED | OUTPATIENT
Start: 2022-06-06

## 2022-06-06 RX ORDER — SODIUM CHLORIDE 0.9 % (FLUSH) 0.9 %
10 SYRINGE (ML) INJECTION AS NEEDED
Status: CANCELLED | OUTPATIENT
Start: 2022-06-06

## 2022-06-06 NOTE — PATIENT INSTRUCTIONS
Schedule EGD and colonoscopy for further evaluation.     For constipation, OK to continue MiraLAX daily available over-the-counter.  Mix 1 capful in 8 ounces of noncarbonated liquid and drink once daily.     Follow a high-fiber diet.  Consider starting a daily fiber supplement, such as Metamucil or Citrucel, available over-the-counter.

## 2022-06-06 NOTE — PROGRESS NOTES
"Chief Complaint   Patient presents with   • GI Problem   • Constipation           History of Present Illness  Patient today for consultation.  Patient has a history of iron deficiency anemia and had a prior gastric sleeve in 2011.  She is due for colonoscopy for screening.  She has chronic constipation also worsening GERD.    Patient has a history of gastric sleeve, reports she has had some difficulty with anemia and vitamin deficiency since her surgery.    She reports a history of GERD. She has had some worsening symptoms over the last 2 years.    She reports a history of chronic constipation. She takes Miralax for this every other day which helps. She reports her stool is thin and loose while taking Miralax. She denies blood or mucus in her stool currently. Reports around 2-3 years ago she had a bleeding episode that was ultimately attributed to hemorrhoids.    She has a paternal aunt who had colon cancer.       Result Review :       C-reactive protein (05/16/2022 12:26)  Iron Profile (05/16/2022 12:26)  TSH Rfx On Abnormal To Free T4 (05/16/2022 12:26)  Progress Notes by Ginette Valle APRN (05/16/2022 11:15)      Vital Signs:   /80   Pulse 60   Temp 97.3 °F (36.3 °C)   Ht 154.9 cm (61\")   Wt 79.5 kg (175 lb 3.2 oz)   SpO2 98%   BMI 33.10 kg/m²     Body mass index is 33.1 kg/m².     Physical Exam  Vitals reviewed.   Constitutional:       Appearance: Normal appearance.   Abdominal:      General: Bowel sounds are normal. There is no distension.      Palpations: Abdomen is soft. Abdomen is not rigid. There is no mass or pulsatile mass.      Tenderness: There is no abdominal tenderness. There is no guarding or rebound.           Assessment and Plan    Diagnoses and all orders for this visit:    1. Gastroesophageal reflux disease, unspecified whether esophagitis present (Primary)    2. Chronic constipation    3. Encounter for screening colonoscopy    4. H/O gastric sleeve         BRIEF SUMMARY  Patient " today for consultation regarding chronic and worsening GERD.  She is status post gastric sleeve 11 to 12 years ago.  She also is due for screening colonoscopy and has been having chronic and worsening constipation.  She will continue MiraLAX and we will see her back in the office after colonoscopy to discuss further medical therapy for constipation.    I have reviewed and confirmed the accuracy of the HPI and Assessment and Plan as documented by the APRN CANDIE Curry        Follow Up   Return for Follow up to review results after testing complete.    Patient Instructions   Schedule EGD and colonoscopy for further evaluation.     For constipation, OK to continue MiraLAX daily available over-the-counter.  Mix 1 capful in 8 ounces of noncarbonated liquid and drink once daily.     Follow a high-fiber diet.  Consider starting a daily fiber supplement, such as Metamucil or Citrucel, available over-the-counter.         Documentation by Mary SCHREIBER acting as a scribe in the following sections on behalf of the billable provider: HPI, ROS, assessment, & plan.

## 2022-07-06 ENCOUNTER — PREP FOR SURGERY (OUTPATIENT)
Dept: SURGERY | Facility: SURGERY CENTER | Age: 52
End: 2022-07-06

## 2022-07-06 ENCOUNTER — TELEPHONE (OUTPATIENT)
Dept: GASTROENTEROLOGY | Facility: CLINIC | Age: 52
End: 2022-07-06

## 2022-07-06 DIAGNOSIS — Z12.11 ENCOUNTER FOR SCREENING COLONOSCOPY: Primary | ICD-10-CM

## 2022-07-06 RX ORDER — SODIUM CHLORIDE 0.9 % (FLUSH) 0.9 %
10 SYRINGE (ML) INJECTION AS NEEDED
Status: CANCELLED | OUTPATIENT
Start: 2022-07-06

## 2022-07-06 RX ORDER — SODIUM CHLORIDE, SODIUM LACTATE, POTASSIUM CHLORIDE, CALCIUM CHLORIDE 600; 310; 30; 20 MG/100ML; MG/100ML; MG/100ML; MG/100ML
30 INJECTION, SOLUTION INTRAVENOUS CONTINUOUS PRN
Status: CANCELLED | OUTPATIENT
Start: 2022-07-06

## 2022-07-06 RX ORDER — SODIUM CHLORIDE 0.9 % (FLUSH) 0.9 %
3 SYRINGE (ML) INJECTION EVERY 12 HOURS SCHEDULED
Status: CANCELLED | OUTPATIENT
Start: 2022-07-06

## 2022-07-06 NOTE — TELEPHONE ENCOUNTER
Returned patient call and she only wants to do the colonoscopy now and not the egd. Requested a new order today for colonoscopy only and notified clement/wen to cancel the egd.

## 2022-07-07 ENCOUNTER — TELEPHONE (OUTPATIENT)
Dept: INTERNAL MEDICINE | Facility: CLINIC | Age: 52
End: 2022-07-07

## 2022-07-07 NOTE — TELEPHONE ENCOUNTER
Caller: Kiera Avalos    Relationship to patient: Self    Best call back number: [phone]     Date of exposure: UNKNOWN    Date of positive COVID19 test: 07/08/22    COVID19 symptoms: FATIGUE / BODY ACHES / FEVER / CONGESTION     Date of initial quarantine:     Additional information or concerns: PATIENT WOULD LIKE TO KNOW WHAT SHE SHOULD BE TAKING SHE STATED SHE WAS POSITIVE FOR COVID AT THE BEGINNING OF THE YEAR SHE WAS PRESCRIBED [facility] AD BY [facility] SHE WOULD LIKE TO GET ANOTHER PRESCRIPTION     What is the patients preferred pharmacy: [id number] [place], [place] - [address] [address] [facility] AT [facility] - [phone] [facility] [phone]   [phone]

## 2022-07-22 ENCOUNTER — APPOINTMENT (OUTPATIENT)
Dept: LAB | Facility: SURGERY CENTER | Age: 52
End: 2022-07-22

## 2022-07-22 NOTE — SIGNIFICANT NOTE
Education provided the Patient on the following:    - Nothing to Eat or Drink after MN the night before the procedure    - Avoid red/purple fluids while completing their bowel prep as ordered by physician  -Contact Gastrointerologist office for any questions about specific details regarding colon prep    -You will need to have someone drive you home after your colonoscopy and remain with you for 24 hours after the procedure  - The date of your Surgery, you may have one visitor at bedside or within 10-15 minutes of [facility]  -Please wear warm socks when you arrive for your colonoscopy  -Remove all jewelry and leave any valuables before arriving the day of your procedure (all will have to be removed before leaving preop)  -You will need to arrive at 6 on 7/25 for your colonoscopy    -Feel free to contact us at: [phone] with any additional questions/concerns

## 2022-07-25 ENCOUNTER — HOSPITAL ENCOUNTER (OUTPATIENT)
Facility: SURGERY CENTER | Age: 52
Setting detail: HOSPITAL OUTPATIENT SURGERY
Discharge: HOME | End: 2022-07-25
Attending: INTERNAL MEDICINE | Admitting: INTERNAL MEDICINE

## 2022-07-25 ENCOUNTER — ANESTHESIA EVENT (OUTPATIENT)
Dept: SURGERY | Facility: SURGERY CENTER | Age: 52
End: 2022-07-25

## 2022-07-25 ENCOUNTER — ANESTHESIA (OUTPATIENT)
Dept: SURGERY | Facility: SURGERY CENTER | Age: 52
End: 2022-07-25

## 2022-07-25 VITALS
OXYGEN SATURATION: 98 % | WEIGHT: 175 LBS | SYSTOLIC BLOOD PRESSURE: 116 MMHG | DIASTOLIC BLOOD PRESSURE: 70 MMHG | TEMPERATURE: 97.7 F | HEIGHT: 61 IN | RESPIRATION RATE: 16 BRPM | HEART RATE: 65 BPM | BODY MASS INDEX: 33.04 KG/M2

## 2022-07-25 DIAGNOSIS — Z12.11 ENCOUNTER FOR SCREENING COLONOSCOPY: ICD-10-CM

## 2022-07-25 LAB
B-HCG UR QL: NEGATIVE
EXPIRATION DATE: NORMAL
INTERNAL NEGATIVE CONTROL: NORMAL
INTERNAL POSITIVE CONTROL: NORMAL
Lab: NORMAL

## 2022-07-25 PROCEDURE — 25010000002 PROPOFOL 10 MG/ML EMULSION: Performed by: STUDENT IN AN ORGANIZED HEALTH CARE EDUCATION/TRAINING PROGRAM

## 2022-07-25 PROCEDURE — 45378 DIAGNOSTIC COLONOSCOPY: CPT | Performed by: INTERNAL MEDICINE

## 2022-07-25 PROCEDURE — 43235 EGD DIAGNOSTIC BRUSH WASH: CPT | Performed by: NURSE PRACTITIONER

## 2022-07-25 PROCEDURE — 45378 DIAGNOSTIC COLONOSCOPY: CPT | Performed by: NURSE PRACTITIONER

## 2022-07-25 PROCEDURE — 81025 URINE PREGNANCY TEST: CPT | Performed by: INTERNAL MEDICINE

## 2022-07-25 RX ORDER — PROPOFOL 10 MG/ML
VIAL (ML) INTRAVENOUS AS NEEDED
Status: DISCONTINUED | OUTPATIENT
Start: 2022-07-25 | End: 2022-07-25 | Stop reason: SURG

## 2022-07-25 RX ORDER — SODIUM CHLORIDE, SODIUM LACTATE, POTASSIUM CHLORIDE, CALCIUM CHLORIDE 600; 310; 30; 20 MG/100ML; MG/100ML; MG/100ML; MG/100ML
30 INJECTION, SOLUTION INTRAVENOUS CONTINUOUS PRN
Status: DISCONTINUED | OUTPATIENT
Start: 2022-07-25 | End: 2022-07-25 | Stop reason: HOSPADM

## 2022-07-25 RX ORDER — PROMETHAZINE HYDROCHLORIDE 25 MG/1
25 SUPPOSITORY RECTAL ONCE AS NEEDED
Status: DISCONTINUED | OUTPATIENT
Start: 2022-07-25 | End: 2022-07-25 | Stop reason: HOSPADM

## 2022-07-25 RX ORDER — PROMETHAZINE HYDROCHLORIDE 12.5 MG/1
25 TABLET ORAL ONCE AS NEEDED
Status: DISCONTINUED | OUTPATIENT
Start: 2022-07-25 | End: 2022-07-25 | Stop reason: HOSPADM

## 2022-07-25 RX ORDER — LIDOCAINE HYDROCHLORIDE 20 MG/ML
INJECTION, SOLUTION INFILTRATION; PERINEURAL AS NEEDED
Status: DISCONTINUED | OUTPATIENT
Start: 2022-07-25 | End: 2022-07-25 | Stop reason: SURG

## 2022-07-25 RX ADMIN — SODIUM CHLORIDE, POTASSIUM CHLORIDE, SODIUM LACTATE AND CALCIUM CHLORIDE 30 ML/HR: 600; 310; 30; 20 INJECTION, SOLUTION INTRAVENOUS at 06:27

## 2022-07-25 RX ADMIN — PROPOFOL 100 MG: 10 INJECTION, EMULSION INTRAVENOUS at 07:26

## 2022-07-25 RX ADMIN — PROPOFOL 200 MCG/KG/MIN: 10 INJECTION, EMULSION INTRAVENOUS at 07:26

## 2022-07-25 RX ADMIN — LIDOCAINE HYDROCHLORIDE 40 MG: 20 INJECTION, SOLUTION INFILTRATION; PERINEURAL at 07:26

## 2022-07-25 NOTE — H&P
No chief complaint on file.      HPI  Patient today for an EGD due to chronic and worsening GERD and a colonoscopy for screening.  She also has a history of iron deficiency anemia status post gastric sleeve.         Problem List:    Patient Active Problem List   Diagnosis   • Gastroesophageal reflux disease   • History of bariatric surgery   • Iron deficiency anemia   • Seizure (HCC)   • Vitamin B12 deficiency   • Vitamin D deficiency   • Chronic constipation   • Pure hypercholesterolemia   • H/O gastric sleeve   • Encounter for screening colonoscopy       Medical History:    Past Medical History:   Diagnosis Date   • Anemia         Social History:    Social History     Socioeconomic History   • Marital status:    Tobacco Use   • Smoking status: Never Smoker   • Smokeless tobacco: Never Used   Vaping Use   • Vaping Use: Never used   Substance and Sexual Activity   • Alcohol use: Yes     Comment: Maybe once in about 2 to three months   • Drug use: Never   • Sexual activity: Yes     Partners: Male     Birth control/protection: Other, None     Comment: Tubes tied       Family History:   Family History   Problem Relation Age of Onset   • Cancer Mother         Cervical   • Cancer Father         lung   • Heart disease Brother         heart attack at 42   • Diabetes Brother         Type 2   • Stroke Brother    • Cancer Maternal Aunt         Breast Cancer   • Colon cancer Paternal Aunt    • Diabetes Maternal Grandmother    • No Known Problems Paternal Grandmother    • No Known Problems Paternal Grandfather    • No Known Problems Son    • Rheum arthritis Paternal Cousin    • Lupus Paternal Cousin    • Colon polyps Neg Hx    • Crohn's disease Neg Hx    • Irritable bowel syndrome Neg Hx    • Ulcerative colitis Neg Hx        Surgical History:   Past Surgical History:   Procedure Laterality Date   • BARIATRIC SURGERY     •  SECTION     • CHOLECYSTECTOMY     • TONSILLECTOMY     • TUBAL ABDOMINAL  LIGATION 1997         Current Facility-Administered Medications:   •  lactated ringers infusion, 30 mL/hr, Intravenous, Continuous PRN, Chalino, Mary G, APRN, Last Rate: 30 mL/hr at 07/25/22 0627, 30 mL/hr at 07/25/22 0627    Allergies:   Allergies   Allergen Reactions   • Penicillins Hives   • Sulfa Antibiotics Rash        The following portions of the patient's history were reviewed by me and updated as appropriate: review of systems, allergies, current medications, past family history, past medical history, past social history, past surgical history and problem list.    Vitals:    07/25/22 0626   BP: 121/82   Pulse: 69   Resp: 16   Temp: 97 °F (36.1 °C)   SpO2: 98%       PHYSICAL EXAM:    CONSTITUTIONAL:  today's vital signs reviewed by me  GASTROINTESTINAL: abdomen is soft nontender nondistended with normal active bowel sounds, no masses are appreciated    Assessment/ Plan  We will proceed today with EGD and colonoscopy.    Risks and benefits as well as alternatives to endoscopic evaluation were explained to the patient and they voiced understanding and wish to proceed.  These risks include but are not limited to the risk of bleeding, perforation, adverse reaction to sedation, and missed lesions.  The patient was given the opportunity to ask questions prior to the endoscopic procedure.

## 2022-07-25 NOTE — ANESTHESIA POSTPROCEDURE EVALUATION
"Patient: Kiera Avalos    Procedure Summary     Date: 07/25/22 Room / Location: [facility]    Anesthesia Start: 0722 Anesthesia Stop: 0743    Procedure: COLONOSCOPY (N/A ) Diagnosis:       Encounter for screening colonoscopy      (Encounter for screening colonoscopy [Z12.11])    Surgeons: Troy Pittman MD Provider: Tom Rothman MD    Anesthesia Type: MAC ASA Status: 2          Anesthesia Type: MAC    Vitals  No vitals data found for the desired time range.          Post Anesthesia Care and Evaluation    Patient location during evaluation: PACU  Patient participation: complete - patient participated  Level of consciousness: awake and alert  Pain management: adequate    Airway patency: patent  Anesthetic complications: No anesthetic complications  PONV Status: controlled  Cardiovascular status: acceptable and hemodynamically stable  Respiratory status: acceptable  Hydration status: acceptable    Comments: /82 (BP Location: Left arm, Patient Position: Sitting)   Pulse 69   Temp 36.1 °C (97 °F) (Temporal)   Resp 16   Ht 154.9 cm (61\")   Wt 79.4 kg (175 lb)   SpO2 98%   BMI 33.07 kg/m²       "

## 2022-07-25 NOTE — ANESTHESIA PREPROCEDURE EVALUATION
Anesthesia Evaluation     Patient summary reviewed and Nursing notes reviewed   no history of anesthetic complications:  NPO Solid Status: > 8 hours  NPO Liquid Status: > 2 hours           Airway   Mallampati: II  TM distance: >3 FB  Neck ROM: full  Dental - normal exam     Pulmonary    Cardiovascular   Exercise tolerance: excellent (>7 METS)    (+) hyperlipidemia,       Neuro/Psych  (+) seizures,    GI/Hepatic/Renal/Endo    (+)  GERD,      Musculoskeletal (-) negative ROS    Abdominal    Substance History      OB/GYN          Other - negative ROS                       Anesthesia Plan    ASA 2     MAC   total IV anesthesia  (I have reviewed the patient's history with the patient and the chart, including all pertinent laboratory results and imaging. I have explained the risks of anesthesia including but not limited to dental damage, corneal abrasion, nerve injury, MI, stroke, and death. Questions asked and answered. Anesthetic plan discussed with patient and team as indicated. Patient expressed understanding of the above.  )    Anesthetic plan, risks, benefits, and alternatives have been provided, discussed and informed consent has been obtained with: patient.        CODE STATUS:

## 2022-09-27 ENCOUNTER — OFFICE VISIT (OUTPATIENT)
Dept: OBSTETRICS AND GYNECOLOGY | Facility: CLINIC | Age: 52
End: 2022-09-27

## 2022-09-27 VITALS
DIASTOLIC BLOOD PRESSURE: 82 MMHG | SYSTOLIC BLOOD PRESSURE: 124 MMHG | BODY MASS INDEX: 34.14 KG/M2 | HEIGHT: 61 IN | WEIGHT: 180.8 LBS

## 2022-09-27 DIAGNOSIS — Z01.419 PAP SMEAR, LOW-RISK: Primary | ICD-10-CM

## 2022-09-27 DIAGNOSIS — Z01.419 ROUTINE GYNECOLOGICAL EXAMINATION: ICD-10-CM

## 2022-09-27 DIAGNOSIS — R63.5 WEIGHT GAIN: ICD-10-CM

## 2022-09-27 DIAGNOSIS — Z11.51 SCREENING FOR HUMAN PAPILLOMAVIRUS: ICD-10-CM

## 2022-09-27 LAB
BILIRUB BLD-MCNC: NEGATIVE MG/DL
CLARITY, POC: CLEAR
COLOR UR: YELLOW
GLUCOSE UR STRIP-MCNC: NEGATIVE MG/DL
KETONES UR QL: NEGATIVE
LEUKOCYTE EST, POC: NEGATIVE
NITRITE UR-MCNC: NEGATIVE MG/ML
PH UR: 5 [PH] (ref 5–8)
PROT UR STRIP-MCNC: NEGATIVE MG/DL
RBC # UR STRIP: NEGATIVE /UL
SP GR UR: 1 (ref 1–1.03)
UROBILINOGEN UR QL: NORMAL

## 2022-09-27 PROCEDURE — 81002 URINALYSIS NONAUTO W/O SCOPE: CPT | Performed by: OBSTETRICS & GYNECOLOGY

## 2022-09-27 PROCEDURE — 99386 PREV VISIT NEW AGE 40-64: CPT | Performed by: OBSTETRICS & GYNECOLOGY

## 2022-09-27 NOTE — PROGRESS NOTES
GYN Annual Exam     CC- Here for annual exam.     Kiera Avalos is a 51 y.o. female who presents for annual well woman exam. Pt is a new pt to me. She has not had a pap smear in 3 years. She moves here from [place]. Pt is starting with irregular cycles every 6-8 moths for last 3 years. Pt reports vasomotor sx approx 2 years ago but none now. Pt complaining of weight gain- approx 30 pounds. She has hs of weight loss surgery and is concerned about the weight gain.    OB History    No obstetric history on file.         Current contraception: perimenopause  History of abnormal Pap smear: no  History of abnormal mammogram: yes - right breast cysts- benign appearing in   Family history of uterine, colon or ovarian cancer: no  Family history of breast cancer: no    Health Maintenance   Topic Date Due   • Annual Gynecologic Pelvic and Breast Exam  Never done   • COVID-19 Vaccine (1) Never done   • TDAP/TD VACCINES (1 - Tdap) Never done   • ZOSTER VACCINE (1 of 2) Never done   • INFLUENZA VACCINE  Never done   • PAP SMEAR  2022   • ANNUAL PHYSICAL  08/10/2022   • MAMMOGRAM  2023   • LIPID PANEL  2023   • COLORECTAL CANCER SCREENING  2032   • HEPATITIS C SCREENING  Completed   • Pneumococcal Vaccine 0-64  Aged Out       Past Medical History:   Diagnosis Date   • Anemia        Past Surgical History:   Procedure Laterality Date   • BARIATRIC SURGERY     •  SECTION     • CHOLECYSTECTOMY     • COLONOSCOPY N/A 2022    Procedure: COLONOSCOPY;  Surgeon: Troy Pittman MD;  Location: [facility] OR;  Service: Gastroenterology;  Laterality: N/A;  normal   • TONSILLECTOMY     • TUBAL ABDOMINAL LIGATION           Current Outpatient Medications:   •  esomeprazole (nexIUM) 20 MG capsule, Take 20 mg by mouth Every Morning Before Breakfast., Disp: , Rfl:   •  ferrous gluconate (FERGON) 324 MG tablet, Take 1 tablet by mouth Daily With Breakfast., Disp: 90 tablet, Rfl: 1  •   "hydroCHLOROthiazide (HYDRODIURIL) 12.5 MG tablet, Take 1 tablet by mouth Daily., Disp: 30 tablet, Rfl: 5  •  multivitamin (THERAGRAN) tablet tablet, Take  by mouth., Disp: , Rfl:     Allergies   Allergen Reactions   • Penicillins Hives   • Sulfa Antibiotics Rash       Social History     Tobacco Use   • Smoking status: Never Smoker   • Smokeless tobacco: Never Used   Vaping Use   • Vaping Use: Never used   Substance Use Topics   • Alcohol use: Yes     Comment: Maybe once in about 2 to three months   • Drug use: Never       Family History   Problem Relation Age of Onset   • Cancer Mother         Cervical   • Cancer Father         lung   • Heart disease Brother         heart attack at 42   • Diabetes Brother         Type 2   • Stroke Brother    • Cancer Maternal Aunt         Breast Cancer   • Colon cancer Paternal Aunt    • Diabetes Maternal Grandmother    • No Known Problems Paternal Grandmother    • No Known Problems Paternal Grandfather    • No Known Problems Son    • Rheum arthritis Paternal Cousin    • Lupus Paternal Cousin    • Colon polyps Neg Hx    • Crohn's disease Neg Hx    • Irritable bowel syndrome Neg Hx    • Ulcerative colitis Neg Hx        Review of Systems   Constitutional: Positive for unexpected weight change. Negative for appetite change, chills, fatigue and fever.   Gastrointestinal: Negative for abdominal distention, abdominal pain, anal bleeding, blood in stool, constipation, diarrhea, nausea and vomiting.   Genitourinary: Negative for dyspareunia, dysuria, menstrual problem, pelvic pain, vaginal bleeding, vaginal discharge and vaginal pain.       /82   Ht 154.9 cm (61\")   Wt 82 kg (180 lb 12.8 oz)   BMI 34.16 kg/m²     Physical Exam  Vitals reviewed.   Constitutional:       General: She is not in acute distress.     Appearance: Normal appearance. She is well-developed. She is not ill-appearing, toxic-appearing or diaphoretic.   HENT:      Mouth/Throat:      Dentition: Normal dentition. " No dental caries.   Cardiovascular:      Rate and Rhythm: Normal rate and regular rhythm.      Heart sounds: Normal heart sounds.   Pulmonary:      Effort: Pulmonary effort is normal. No respiratory distress.      Breath sounds: Normal breath sounds. No stridor. No wheezing.   Chest:   Breasts:      Right: No inverted nipple, mass, nipple discharge, skin change or tenderness.      Left: No inverted nipple, mass, nipple discharge, skin change or tenderness.       Abdominal:      General: There is no distension.      Palpations: Abdomen is soft. There is no mass.      Tenderness: There is no abdominal tenderness.   Genitourinary:     General: Normal vulva.      Labia:         Right: No rash, tenderness or lesion.         Left: No rash, tenderness or lesion.       Urethra: No prolapse, urethral pain, urethral swelling or urethral lesion.      Vagina: No vaginal discharge, tenderness or bleeding.      Cervix: No cervical motion tenderness, discharge or friability.      Uterus: Not deviated, not enlarged, not fixed and not tender.       Adnexa:         Right: No mass, tenderness or fullness.          Left: No mass, tenderness or fullness.        Rectum: No tenderness or external hemorrhoid.   Musculoskeletal:         General: No tenderness. Normal range of motion.   Skin:     General: Skin is warm.      Findings: No erythema or rash.   Neurological:      General: No focal deficit present.      Mental Status: She is alert and oriented to person, place, and time. Mental status is at baseline.      Cranial Nerves: No cranial nerve deficit.      Motor: No weakness.      Coordination: Coordination normal.      Gait: Gait normal.   Psychiatric:         Mood and Affect: Mood normal.         Behavior: Behavior normal.         Thought Content: Thought content normal.         Judgment: Judgment normal.            Assessment/Plan    1) GYN HM: check pap smear. Check MMG. SBE demonstrated and encouraged.  2) STD screening:  declines  3) Bone health - Weight bearing exercise, dietary calcium recommendations and vitamin D reviewed. Pt has had weight loss surgery in   4) Diet and Exercise discussed  5) Smoking Status: nonsmoker  6) Social: moved from [place]. Second marriage for pt.  7) Weight gain: Check HgbA1c and fasting insulin. Discussed menopause causing some symptoms. Discussed low carbs and high protein.  8) Follow up prn and 1 year       Diagnoses and all orders for this visit:    Pap smear, low-risk  -     IgP, Aptima HPV    Routine gynecological examination  -     POC Urinalysis Dipstick  -     IgP, Aptima HPV  -     Mammo Screening Bilateral With CAD; Future    Screening for human papillomavirus  -     IgP, Aptima HPV    Weight gain  -     Insulin, Random  -     Hemoglobin A1c        Cecy Brannon, DO  9/29/2022  09:02 EDT

## 2022-09-30 LAB
HBA1C MFR BLD: 5.2 % (ref 4.8–5.6)
INSULIN SERPL-ACNC: 2.1 UIU/ML

## 2022-10-03 LAB
CYTOLOGIST CVX/VAG CYTO: NORMAL
CYTOLOGY CVX/VAG DOC CYTO: NORMAL
CYTOLOGY CVX/VAG DOC THIN PREP: NORMAL
DX ICD CODE: NORMAL
HIV 1 & 2 AB SER-IMP: NORMAL
HPV I/H RISK 4 DNA CVX QL PROBE+SIG AMP: NEGATIVE
OTHER STN SPEC: NORMAL
STAT OF ADQ CVX/VAG CYTO-IMP: NORMAL

## 2022-11-23 RX ORDER — HYDROCHLOROTHIAZIDE 12.5 MG/1
TABLET ORAL
Qty: 90 TABLET | Refills: 1 | Status: SHIPPED | OUTPATIENT
Start: 2022-11-23 | End: 2022-11-28

## 2022-11-28 ENCOUNTER — OFFICE VISIT (OUTPATIENT)
Dept: INTERNAL MEDICINE | Facility: CLINIC | Age: 52
End: 2022-11-28

## 2022-11-28 VITALS
HEART RATE: 96 BPM | TEMPERATURE: 98.1 F | HEIGHT: 61 IN | DIASTOLIC BLOOD PRESSURE: 82 MMHG | SYSTOLIC BLOOD PRESSURE: 122 MMHG | WEIGHT: 188 LBS | BODY MASS INDEX: 35.5 KG/M2 | OXYGEN SATURATION: 98 %

## 2022-11-28 DIAGNOSIS — Z90.3 H/O GASTRIC SLEEVE: ICD-10-CM

## 2022-11-28 DIAGNOSIS — E66.01 CLASS 2 SEVERE OBESITY WITH SERIOUS COMORBIDITY AND BODY MASS INDEX (BMI) OF 35.0 TO 35.9 IN ADULT, UNSPECIFIED OBESITY TYPE: Primary | ICD-10-CM

## 2022-11-28 DIAGNOSIS — E78.00 PURE HYPERCHOLESTEROLEMIA: ICD-10-CM

## 2022-11-28 DIAGNOSIS — R60.0 BILATERAL LEG EDEMA: ICD-10-CM

## 2022-11-28 DIAGNOSIS — I89.0 LYMPHEDEMA OF BOTH LOWER EXTREMITIES: Primary | ICD-10-CM

## 2022-11-28 DIAGNOSIS — K21.9 GASTROESOPHAGEAL REFLUX DISEASE, UNSPECIFIED WHETHER ESOPHAGITIS PRESENT: ICD-10-CM

## 2022-11-28 DIAGNOSIS — E66.01 CLASS 2 SEVERE OBESITY WITH SERIOUS COMORBIDITY AND BODY MASS INDEX (BMI) OF 35.0 TO 35.9 IN ADULT, UNSPECIFIED OBESITY TYPE: ICD-10-CM

## 2022-11-28 DIAGNOSIS — I89.0 LYMPHEDEMA OF BOTH LOWER EXTREMITIES: ICD-10-CM

## 2022-11-28 PROBLEM — E66.812 CLASS 2 SEVERE OBESITY WITH SERIOUS COMORBIDITY AND BODY MASS INDEX (BMI) OF 35.0 TO 35.9 IN ADULT: Status: ACTIVE | Noted: 2022-11-28

## 2022-11-28 PROCEDURE — 99214 OFFICE O/P EST MOD 30 MIN: CPT | Performed by: NURSE PRACTITIONER

## 2022-11-28 RX ORDER — HYDROCHLOROTHIAZIDE 25 MG/1
25 TABLET ORAL DAILY
Start: 2022-11-28

## 2022-11-28 NOTE — PROGRESS NOTES
"Subjective   Kiera Avalos is a 52 y.o. female.     Chief Complaint   Patient presents with   • Follow-up     Patient is here for a follow up after seeing vascular surgery. Patient also wants to discuss weight loss options.    • Leg Swelling          • Edema     Patient would like to be referred to a specialist.        History of Present Illness   She is here today for follow-up on bilateral lower extremity edema.  She completed a venous duplex in May showing deep venous valvular incompetence bilaterally.  She was started on hydrochlorothiazide 12.5 mg daily and a referral was placed to vascular surgery at that time for further evaluation. Vascular surgery diagnosed her with lymphedema. She has been elevating the legs, wearing compression socks, doing massages at home. Her swelling has become worse and her legs are now more painful. The right side is worse than the left. She has had to buy new shoes in a larger size and width recently.  She feels that the hydrochlorothiazide helped initially, but she is not seeing much improvement now.  She will have occasional difficulty walking secondary to bilateral lower extremity swelling.  She is also concerned about continued weight gain. She has been struggling with over eating. \"I am hungry all of the time.\" She has gained 40 lbs back over the past 1-2 years. She has previously had weight loss surgery in 2011 with successful 100 lb weight loss.  She is inquiring about possible weight loss medication.    The following portions of the patient's history were reviewed and updated as appropriate: allergies, current medications, past family history, past medical history, past social history, past surgical history and problem list.    Review of Systems   Constitutional: Positive for unexpected weight gain. Negative for chills, fatigue and fever.   Respiratory: Negative for cough, chest tightness, shortness of breath and wheezing.    Cardiovascular: Positive for leg swelling. Negative " for chest pain and palpitations.   Musculoskeletal: Positive for gait problem.   Psychiatric/Behavioral: Negative for suicidal ideas and depressed mood. The patient is not nervous/anxious.        Objective   Physical Exam  Constitutional:       Appearance: She is well-developed.   Neck:      Thyroid: No thyroid mass, thyromegaly or thyroid tenderness.      Vascular: No carotid bruit.      Trachea: Trachea normal.   Cardiovascular:      Rate and Rhythm: Normal rate and regular rhythm.      Chest Wall: PMI is not displaced.      Pulses:           Radial pulses are 2+ on the right side and 2+ on the left side.        Dorsalis pedis pulses are 2+ on the right side and 2+ on the left side.        Posterior tibial pulses are 2+ on the right side and 2+ on the left side.      Heart sounds: S1 normal and S2 normal.      Comments: Bilateral LE non-pitting edema, right greater than left. Skin darkening present bilaterally, right greater than left.   Pulmonary:      Effort: Pulmonary effort is normal.      Breath sounds: Normal breath sounds.   Musculoskeletal:      Right lower le+ Edema present.      Left lower le+ Edema present.   Lymphadenopathy:      Head:      Right side of head: No submental, submandibular, tonsillar or occipital adenopathy.      Left side of head: No submental, submandibular, tonsillar or occipital adenopathy.      Cervical: No cervical adenopathy.   Skin:     General: Skin is warm and dry.      Capillary Refill: Capillary refill takes less than 2 seconds.      Nails: There is no clubbing.   Neurological:      Mental Status: She is alert and oriented to person, place, and time.   Psychiatric:         Attention and Perception: Attention normal.         Mood and Affect: Mood and affect normal.         Speech: Speech normal.         Behavior: Behavior normal.         Thought Content: Thought content normal.         Cognition and Memory: Cognition normal.         Vitals:    22 0937   BP:  122/82   Pulse: 96   Temp: 98.1 °F (36.7 °C)   SpO2: 98%      Body mass index is 35.54 kg/m².    Assessment & Plan   Diagnoses and all orders for this visit:    1. Lymphedema of both lower extremities (Primary)  -     Basic Metabolic Panel  -     Ambulatory Referral to Physical Therapy Lymphedema    2. Bilateral leg edema  -     Basic Metabolic Panel    3. Class 2 severe obesity with serious comorbidity and body mass index (BMI) of 35.0 to 35.9 in adult, unspecified obesity type (HCC)    4. H/O gastric sleeve    Other orders  -     hydroCHLOROthiazide (HYDRODIURIL) 25 MG tablet; Take 1 tablet by mouth Daily.      1.  Lymphedema both lower extremities- this is becoming worse.  Referral placed to lymphedema clinic for further evaluation and management.  We will try increasing hydrochlorothiazide to 25 mg daily.  Check BMP today.  Recommend continuing lower extremity elevation, compression socks and massage.  We will also work on weight loss.  2.  Obesity/history of gastric sleeve-discussed weight loss medication options with her today including Wegovy, Qsymia and Plenity.  She will check with insurance on coverage and we will follow-up in 6 to 8 weeks.       Answers for HPI/ROS submitted by the patient on 11/21/2022  Please describe your symptoms.: Follow up on legs swelling  Have you had these symptoms before?: Yes  How long have you been having these symptoms?: Greater than 2 weeks  What is the primary reason for your visit?: Other

## 2022-11-29 LAB
BUN SERPL-MCNC: 13 MG/DL (ref 6–20)
BUN/CREAT SERPL: 16.9 (ref 7–25)
CALCIUM SERPL-MCNC: 9.1 MG/DL (ref 8.6–10.5)
CHLORIDE SERPL-SCNC: 103 MMOL/L (ref 98–107)
CO2 SERPL-SCNC: 30.6 MMOL/L (ref 22–29)
CREAT SERPL-MCNC: 0.77 MG/DL (ref 0.57–1)
EGFRCR SERPLBLD CKD-EPI 2021: 91.8 ML/MIN/1.73
GLUCOSE SERPL-MCNC: 87 MG/DL (ref 65–99)
POTASSIUM SERPL-SCNC: 4.4 MMOL/L (ref 3.5–5.2)
SODIUM SERPL-SCNC: 138 MMOL/L (ref 136–145)

## 2022-12-01 DIAGNOSIS — Z90.3 H/O GASTRIC SLEEVE: ICD-10-CM

## 2022-12-01 DIAGNOSIS — E78.00 PURE HYPERCHOLESTEROLEMIA: ICD-10-CM

## 2022-12-01 DIAGNOSIS — E66.01 CLASS 2 SEVERE OBESITY WITH SERIOUS COMORBIDITY AND BODY MASS INDEX (BMI) OF 35.0 TO 35.9 IN ADULT, UNSPECIFIED OBESITY TYPE: ICD-10-CM

## 2022-12-01 DIAGNOSIS — I89.0 LYMPHEDEMA OF BOTH LOWER EXTREMITIES: ICD-10-CM

## 2022-12-01 DIAGNOSIS — K21.9 GASTROESOPHAGEAL REFLUX DISEASE, UNSPECIFIED WHETHER ESOPHAGITIS PRESENT: ICD-10-CM

## 2022-12-05 ENCOUNTER — HOSPITAL ENCOUNTER (OUTPATIENT)
Dept: OCCUPATIONAL THERAPY | Facility: HOSPITAL | Age: 52
Setting detail: THERAPIES SERIES
Discharge: HOME | End: 2022-12-05

## 2022-12-05 DIAGNOSIS — I89.0 LYMPHEDEMA OF BOTH LOWER EXTREMITIES: Primary | ICD-10-CM

## 2022-12-05 PROCEDURE — 97535 SELF CARE MNGMENT TRAINING: CPT

## 2022-12-05 PROCEDURE — 97166 OT EVAL MOD COMPLEX 45 MIN: CPT

## 2022-12-05 NOTE — THERAPY EVALUATION
Outpatient Occupational Therapy Lymphedema Initial Evaluation  [facility]     Patient Name: Kiera Avalos  : 1970  MRN: [id number]  Today's Date: 2022      Visit Date: 2022    Patient Active Problem List   Diagnosis   • Gastroesophageal reflux disease   • History of bariatric surgery   • Iron deficiency anemia   • Seizure ([facility])   • Vitamin B12 deficiency   • Vitamin D deficiency   • Chronic constipation   • Pure hypercholesterolemia   • H/O gastric sleeve   • Encounter for screening colonoscopy   • Class 2 severe obesity with serious comorbidity and body mass index (BMI) of 35.0 to 35.9 in adult (HCC)        Past Medical History:   Diagnosis Date   • Anemia         Past Surgical History:   Procedure Laterality Date   • BARIATRIC SURGERY     •  SECTION     • CHOLECYSTECTOMY     • COLONOSCOPY N/A 2022    Procedure: COLONOSCOPY;  Surgeon: Troy Pittman MD;  [place] [facility] [place];  Service: Gastroenterology;  Laterality: N/A;  normal   • TONSILLECTOMY     • TUBAL ABDOMINAL LIGATION           Visit Dx:     ICD-10-CM ICD-9-CM   1. Lymphedema of both lower extremities  I89.0 457.1        Patient History     Row Name 22 1000 22 0723          History    Chief Complaint Pain;Swelling;Tightness;Difficulty Walking  -RE Balance Problems;Difficulty Walking;Pain;Swelling;Tightness;Tinglings  -RE (r) patient (t)     Type of Pain Lower Extremity / Leg;Foot pain  -RE Foot pain;Lower Extremity / Leg  -RE (r) patient (t)     Date Current Problem(s) Began 22  -RE 22  -RE (r) patient (t)     Brief Description of Current Complaint Patient has a 4 or 5 year history of lower leg swelling which has become progressively worse. She is unable to control her symptoms with edema massage and elevation. she has tried some compression stockings but has has trouble with getting a comfortable fit. She does not wear compression consistently for that reason.  -RE  Swelling, pain, tightness, mobility impaired,  -RE (r) patient (t)     Patient/Caregiver Goals Relieve pain;Return to prior level of function;Improve mobility;Know what to do to help the symptoms;Decrease swelling  -RE Relieve pain;Return to prior level of function;Improve mobility;Know what to do to help the symptoms;Decrease swelling  -RE (r) patient (t)     Hand Dominance right-handed  -RE right-handed  -RE (r) patient (t)     Occupation/sports/leisure activities Walking, exercise until this started  -RE Walking, exercise until this started  -RE (r) patient (t)     Patient seeing anyone else for problem(s)? No  -RE No  -RE (r) patient (t)     What clinical tests have you had for this problem? --  doppler  -RE Other 1 (comment)  -RE (r) patient (t)     Additional Clinical Tests -- Ultrasound on both legs, results in chart. Also was sent to a  vein Dr .  -RE (r) patient (t)     Results of Clinical Tests negative for DVT  -RE --     Are you or can you be pregnant -- No  -RE (r) patient (t)        Pain     Pain Location Leg;Foot  -RE --     Pain at Present 4  -RE --     Pain at Worst 8  -RE --        Fall Risk Assessment    Any falls in the past year: Yes  -RE Yes  -RE (r) patient (t)     Factors that contributed to the fall: Lost balance  -RE Lost balance  -RE (r) patient (t)        Services    Prior Rehab/Home Health Experiences -- No  -RE (r) patient (t)     Are you currently receiving Home Health services No  -RE No  -RE (r) patient (t)     Do you plan to receive Home Health services in the near future -- No  -RE (r) patient (t)        Daily Activities    Primary Language English  -RE English  -RE (r) patient (t)     Are you able to read Yes  -RE Yes  -RE (r) patient (t)     Are you able to write Yes  -RE Yes  -RE (r) patient (t)     How does patient learn best? Demonstration;Pictures/Video  -RE Demonstration;Pictures/Video  -RE (r) patient (t)     Patient is concerned about/has problems with Walking;Other  "(comment);Performing sports, recreation, and play activities  pain and swelling  -RE --     Does patient have problems with the following? None  -RE --     Barriers to learning None  -RE --     Pt Participated in POC and Goals Yes  -RE --        Safety    Are you being hurt, hit, or frightened by anyone at home or in your life? No  -RE No  -RE (r) patient (t)     Are you being neglected by a caregiver No  -RE No  -RE (r) patient (t)     Have you had any of the following issues with N/A  -RE N/A  -RE (r) patient (t)           User Key  (r) = Recorded By, (t) = Taken By, (c) = Cosigned By    Initials Name Provider Type    Cassidy Coronado OTR Occupational Therapist    patient Kiera Avalos --                 Lymphedema     Row Name 12/05/22 1100             Subjective Pain    Able to rate subjective pain? yes  -RE      Pre-Treatment Pain Level 4  -RE         Lymphedema Assessment    Lymphedema Classification RLE:;LLE:;secondary;stage 2 (Spontaneously Irreversible)  -RE      Infections or Cellulitis? no  -RE         LLIS - Physical Concerns    The amount of pain associated with my lymphedema is: 3  -RE      The amount of limb heaviness associated with my lymphedema is: 4  -RE      The amount of skin tightness associated with my lymphedema is: 4  -RE      The size of my swollen limb(s) seems: 4  -RE      Lymphedema affects the movement of my swollen limb(s): 3  -RE      The strength in my swollen limb(s) is: 3  -RE         LLIS - Psychosocial Concerns    Lymphedema affects my body image (i.e., \"how I think I look\"). 4  -RE      Lymphedema affects my socializing with others. 4  -RE      Lymphedema affects my intimate relations with spouse or partner (rate 0 if not applicable 3  -RE      Lymphedema \"gets me down\" (i.e., depression, frustration, or anger) 4  -RE      I must rely on others for help due to my lymphedema. 2  -RE      I know what to do to manage my lymphedema 1  -RE         LLIS - Functional Concerns    " Lymphedema affects my ability to perform self-care activities (i.e. eating, dressing, hygiene) 1  -RE      Lymphedema affects my ability to perform routine home or work-related activities. 3  -RE      Lymphedema affects my performance of preferred leisure activities. 4  -RE      Lymphedema affects proper fit of clothing/shoes 4  -RE      Lymphedema affects my sleep 2  -RE         Posture/Observations    Posture- WNL Posture is WNL  -RE         General ROM    GENERAL ROM COMMENTS B LE AROM is WFL  -RE         Lymphedema Edema Assessment    Ptting Edema Category By grade out of 4  -RE      Pitting Edema + 2/4;Moderate  -RE      Stemmer Sign bilateral:;negative  -RE      Allegheny Hump bilateral:;negative  -RE         Skin Changes/Observations    Skin Observations Comment normal color; small varicosities  -RE         Lymphedema Pulses/Capillary Refill    Lymphedema Pulses/Capillary Refill capillary refill  -RE      Capillary Refill lower extremity capillary refill  -RE      Lower Extremity Capillary Refill right:;left:;less than 3 seconds  -RE         Lymphedema Measurements    Measurement Type(s) Circumferential  -RE      Circumferential Areas Lower extremities  -RE         BLE Circumferential (cm)    Measurement Location 1 knee  -RE      Left 1 39 cm  -RE      Right 1 36.5 cm  -RE      Measurement Location 2 10 cmbelow knee  -RE      Left 2 42.8 cm  -RE      Right 2 41 cm  -RE      Measurement Location 3 20 cm below knee  -RE      Left 3 38.8 cm  -RE      Right 3 40 cm  -RE      Measurement Location 4 30 cm below knee  -RE      Left 4 31 cm  -RE      Right 4 33.5 cm  -RE      Measurement Location 5 Ankle  -RE      Left 5 25 cm  -RE      Right 5 24.4 cm  -RE      Measurement Location 6 mid foot  -RE      Left 6 20.3 cm  -RE      Right 6 19.7 cm  -RE      LLE Circumferential Total 196.9 cm  -RE      RLE Circumferential Total 195.1 cm  -RE         Compression/Skin Care    Compression/Skin Care Comments has solidea waist  "high footless tights which do not fit well. Has tried OTC knee highs but they are too tight below the knee  -RE         Lymphedema Life Impact Scale Totals    A.  Total Q1 - Q17 (Do not include Q18) 53  -RE      B.  Total number of questions answered (Q1-Q17) 17  -RE      C. Divide A by B 3.12  -RE      D. Multiple C by 25 78  -RE            User Key  (r) = Recorded By, (t) = Taken By, (c) = Cosigned By    Initials Name Provider Type    RE Cassidy Guzman, NILTONR Occupational Therapist                        Therapy Education  Education Details: Discussed lymphedema treatment including estimated duration. Gave patient \"Healthy Habits for Lymphedema Patients\" and \"What is Lymphedema\" and reviewed with patient. Discussed disease process and what to expect from therapy.  Discussed cost of bandages. Patient had several questions and concerns regarding her dx and care which we  discussed in detail. I also recommended the [facility] website for further info.  Given: Symptoms/condition management, Bandaging/dressing change, Edema management, Other (comment)  Program: New  How Provided: Verbal, Written  Provided to: Patient, Caregiver  Level of Understanding: Teach back education performed, Verbalized  72604 - OT Self Care/Mgmt Minutes: 25         OT Goals     Row Name 12/05/22 1100          OT Short Term Goals    STG Date to Achieve 12/19/22  -RE     STG 1 Patient independent and compliant with self-wrapping techniques of compression bandages with assistance of caregiver as needed for improved self-management of condition.  -RE     STG 1 Progress New  -RE     STG 2 Patient will demonstrate proper awareness of “What is Lymphedema?” and \"Healthy Habits\" for improved prevention, management, care of symptoms and ease of transition to self-care of condition.  -RE     STG 2 Progress New  -RE     STG 3 Patient will demonstrate decreased net edema of bilateral lower extremities >/= 3-7cm  for decrease in edema, symptoms, decreased risk of " infection and improved skin care/transition to self-care of condition.  -RE     STG 3 Progress New  -RE        Long Term Goals    LTG Date to Achieve 01/02/23  -RE     LTG 1 Patient independent and compliant with self-massage/soft tissue techniques with spouse/family member as needed for improved lymphatic drainage, decreased edema/symptoms, decreased risk of infection and improved transition to self-care of condition.  -RE     LTG 1 Progress New  -RE     LTG 2 Patient will demonstrate decreased net edema of bilateral lower extremities >/= 8-15 cm  for decrease in edema, symptoms, decreased risk of infection and improved skin care/transition to self-care of condition.  -RE     LTG 2 Progress New  -RE     LTG 3 Patient independent and compliant with initial home exercise program focused on diaphragmatic breathing, range of motion, flexibility to decrease edema and improve lymphatic flow for decreased edema and decreased risk of infection.  -RE     LTG 3 Progress New  -RE     LTG 4 Patient independent and compliant with use and care of compression garments and/or Velcro products with assistance of a caregiver as needed to promote self-care independence.  -RE     LTG 4 Progress New  -RE     LTG 5 Patient to improve LLIS by 10 points in 4 weeks as an indicator of improved quality of life.  -RE     LTG 5 Progress New  -RE        Time Calculation    OT Goal Re-Cert Due Date 03/05/23  -RE           User Key  (r) = Recorded By, (t) = Taken By, (c) = Cosigned By    Initials Name Provider Type    Cassidy Coronado OTR Occupational Therapist                 OT Assessment/Plan     Row Name 12/05/22 1118          OT Assessment    Functional Limitations Limitations in community activities;Limitation in home management  -RE     Impairments Edema;Impaired lymphatic circulation;Impaired venous circulation;Pain  -RE     Assessment Comments Patient is a 52 y.o. female that presents with signs and symptoms consistent with bilateralLE  lymphedema which extends from foot to knee . Edema is soft  with 2+ pitting primarily on the right.  The total circumference of the R LE is 195.1 cm and the L LE is 196.1 cm. She has an LLIS score of 78 indicating a significant impact of lymphedema on her quality of life.  she could benefit from Complete Decongestive Therapy to decrease edema, protect and improve skin integrity, decrease the risk of infection and to learn self-care strategies.  -RE     Please refer to paper survey for additional self-reported information Yes  -RE     OT Diagnosis B LE lymphedema  -RE     OT Rehab Potential Good  -RE     Patient/caregiver participated in establishment of treatment plan and goals Yes  -RE     Patient would benefit from skilled therapy intervention Yes  -RE        OT Plan    OT Frequency 4x/week  -RE     Predicted Duration of Therapy Intervention (OT) 4-6 weeks  -RE     Planned CPT's? OT EVAL MOD COMPLEXITY: 18527;OT THER PROC EA 15 MIN: [id number];OT SELF CARE/MGMT/TRAIN 15 MIN: 35648;OT MANUAL THERAPY EA 15 MIN: 65086  -RE     Planned Therapy Interventions (Optional Details) home exercise program;manual therapy techniques;patient/family education;other (see comments)  skin care and compression bandaging  -RE           User Key  (r) = Recorded By, (t) = Taken By, (c) = Cosigned By    Initials Name Provider Type    Cassidy Coronado OTR Occupational Therapist                          Time Calculation:   OT Start Time: 0820  OT Stop Time: 0930  OT Time Calculation (min): 70 min  Total Timed Code Minutes- OT: 25 minute(s)  Timed Charges  14784 - OT Self Care/Mgmt Minutes: 25  Untimed Charges  OT Eval/Re-eval Minutes: 45  Total Minutes  Timed Charges Total Minutes: 25  Untimed Charges Total Minutes: 45   Total Minutes: 70     Therapy Charges for Today     Code Description Service Date Service Provider Modifiers Qty    [id number]  OT SELF CARE/MGMT/TRAIN EA 15 MIN 12/5/2022 Cassidy Guzman OTR GO 2    [id number] HC OT EVAL  MOD COMPLEXITY 3 12/5/2022 Cassidy Guzman, OTMAXI GO 1                    ZORAIDA Nichole  12/5/2022

## 2023-01-20 ENCOUNTER — TELEPHONE (OUTPATIENT)
Dept: INTERNAL MEDICINE | Facility: CLINIC | Age: 53
End: 2023-01-20

## 2023-01-20 DIAGNOSIS — K21.9 GASTROESOPHAGEAL REFLUX DISEASE, UNSPECIFIED WHETHER ESOPHAGITIS PRESENT: ICD-10-CM

## 2023-01-20 DIAGNOSIS — I89.0 LYMPHEDEMA OF BOTH LOWER EXTREMITIES: ICD-10-CM

## 2023-01-20 DIAGNOSIS — E66.01 CLASS 2 SEVERE OBESITY WITH SERIOUS COMORBIDITY AND BODY MASS INDEX (BMI) OF 35.0 TO 35.9 IN ADULT, UNSPECIFIED OBESITY TYPE: Primary | ICD-10-CM

## 2023-01-20 DIAGNOSIS — E66.01 CLASS 2 SEVERE OBESITY WITH SERIOUS COMORBIDITY AND BODY MASS INDEX (BMI) OF 35.0 TO 35.9 IN ADULT, UNSPECIFIED OBESITY TYPE: ICD-10-CM

## 2023-01-20 DIAGNOSIS — Z90.3 H/O GASTRIC SLEEVE: ICD-10-CM

## 2023-01-20 DIAGNOSIS — E78.00 PURE HYPERCHOLESTEROLEMIA: ICD-10-CM

## 2023-01-20 NOTE — TELEPHONE ENCOUNTER
Caller: Kiera Avalos    Relationship: Self    Best call back number: [phone]    Requested Prescriptions:   Requested Prescriptions     Pending Prescriptions Disp Refills   • Semaglutide-Weight Management 0.25 MG/0.5ML solution auto-injector 2 mL 0     Sig: Inject 0.25 mg under the skin into the appropriate area as directed 1 (One) Time Per Week.        Pharmacy where request should be sent: [facility]/PHARMACY #[id number] [place], [place] - [address] [facility]. AT [facility] [phone] [facility] [phone]      Additional details provided by patient: PATIENT STATES THAT THE DOSAGE IS SUPPOSED TO BE INCREASED THIS TIME    Does the patient have less than a 3 day supply:  [x] Yes  [] No    Would you like a call back once the refill request has been completed: [x] Yes [] No    If the office needs to give you a call back, can they leave a voicemail: [x] Yes [] No    Jeronimo Jay Rep   01/20/23 12:41 EST

## 2023-01-23 ENCOUNTER — HOSPITAL ENCOUNTER (OUTPATIENT)
Dept: OCCUPATIONAL THERAPY | Facility: HOSPITAL | Age: 53
Setting detail: THERAPIES SERIES
Discharge: HOME | End: 2023-01-23
Payer: COMMERCIAL

## 2023-01-23 DIAGNOSIS — I89.0 LYMPHEDEMA OF BOTH LOWER EXTREMITIES: Primary | ICD-10-CM

## 2023-01-23 PROCEDURE — 97140 MANUAL THERAPY 1/> REGIONS: CPT

## 2023-01-23 NOTE — THERAPY TREATMENT NOTE
Outpatient Occupational Therapy Lymphedema Treatment Note  [facility]     Patient Name: Kiera Avalos  : 1970  MRN: [id number]  Today's Date: 2023      Visit Date: 2023    Patient Active Problem List   Diagnosis   • Gastroesophageal reflux disease   • History of bariatric surgery   • Iron deficiency anemia   • Seizure (HCC)   • Vitamin B12 deficiency   • Vitamin D deficiency   • Chronic constipation   • Pure hypercholesterolemia   • H/O gastric sleeve   • Encounter for screening colonoscopy   • Class 2 severe obesity with serious comorbidity and body mass index (BMI) of 35.0 to 35.9 in adult (HCC)        Past Medical History:   Diagnosis Date   • Anemia         Past Surgical History:   Procedure Laterality Date   • BARIATRIC SURGERY     •  SECTION     • CHOLECYSTECTOMY     • COLONOSCOPY N/A 2022    Procedure: COLONOSCOPY;  Surgeon: Troy iPttman MD;  Location: [facility] [place] MAIN OR;  Service: Gastroenterology;  Laterality: N/A;  normal   • TONSILLECTOMY     • TUBAL ABDOMINAL LIGATION           Visit Dx:      ICD-10-CM ICD-9-CM   1. Lymphedema of both lower extremities  I89.0 457.1        Lymphedema     Row Name 23 1400             Subjective Pain    Able to rate subjective pain? yes  -CW      Pre-Treatment Pain Level 4  -CW      Post-Treatment Pain Level 4  -CW         Subjective Comments    Subjective Comments LE pain 4/10. RLE at times >LLE.  -CW         Skin Changes/Observations    Skin Observations Comment normal color; small varicosities  -CW         Lymphedema Measurements    Measurement Type(s) Circumferential  -CW      Circumferential Areas Lower extremities  -CW         BLE Circumferential (cm)    Measurement Location 1 knee  -CW      Left 1 41 cm  -CW      Right 1 39.5 cm  -CW      Measurement Location 2 10 cm below knee  -CW      Left 2 42.5 cm  -CW      Right 2 43.5 cm  -CW      Measurement Location 3 20 cm below knee  -CW      Left 3 37.4 cm  -CW       Right 3 40.3 cm  -CW      Measurement Location 4 30 cm below knee  -CW      Left 4 30.7 cm  -CW      Right 4 33.5 cm  -CW      Measurement Location 5 Ankle  -CW      Left 5 31.5 cm  -CW      Right 5 28.5 cm  -CW      Measurement Location 6 mid foot  -CW      Left 6 20.6 cm  -CW      Right 6 21 cm  -CW      LLE Circumferential Total 203.7 cm  -CW      RLE Circumferential Total 206.3 cm  -CW         Manual Lymphatic Drainage    Manual Lymphatic Drainage initial sequence;opened regional lymph nodes;opened anastamoses;extremity treatment  -CW      Initial Sequence short neck;abdomen  -CW      Opened Regional Lymph Nodes axillary;inguinal  -CW      Axillary left;right  -CW      Inguinal left;right  -CW      Opened Anastamoses inguino-axillary  -CW      Extremity Treatment MLD to full limb  -CW      MLD to Full Limb BLE's  -CW            User Key  (r) = Recorded By, (t) = Taken By, (c) = Cosigned By    Initials Name Provider Type    CW Clara Cast OTR Occupational Therapist                         OT Assessment/Plan     Row Name 01/23/23 6531          OT Assessment    Assessment Comments Pt. reports BLE general pain 4/10 with RLE more involement per pt. Skin intact with no rashes or irritation. Edema is soft with pitting 2+ B lower leg. Measurements taken. See flow sheet. Total circumference .3 cm. and .7 cm. Discussed lymph. tx. program and poc. Pt. has tried stockings and is interested to obtain a compression pump. Will fax info to [employer] regarding estimation of benefits for stockings/bandages.  -CW        OT Plan    OT Plan Comments Plan to cont. CDT 4x/week and progress to indep. lymph. management.  -CW           User Key  (r) = Recorded By, (t) = Taken By, (c) = Cosigned By    Initials Name Provider Type    CW Clara Cast, OTR Occupational Therapist                    Manual Rx (last 36 hours)     Manual Treatments     Row Name 01/23/23 1414             Total Minutes    49949 - OT Manual  Therapy Minutes 43  -CW            User Key  (r) = Recorded By, (t) = Taken By, (c) = Cosigned By    Initials Name Provider Type    Clara Barrios OTR Occupational Therapist                  Therapy Education  Given: Symptoms/condition management, Edema management  Program: New  How Provided: Verbal  Provided to: Patient, Caregiver  Level of Understanding: Verbalized                Time Calculation:   OT Start Time: 1235  OT Stop Time: 1318  OT Time Calculation (min): 43 min  Total Timed Code Minutes- OT: 43 minute(s)  Timed Charges  46265 - OT Manual Therapy Minutes: 43  Total Minutes  Timed Charges Total Minutes: 43   Total Minutes: 43     Therapy Charges for Today     Code Description Service Date Service Provider Modifiers Qty    [id number] HC OT MANUAL THERAPY EA 15 MIN 1/23/2023 Clara Cast OTR GO 3                      ZORAIDA Jason  1/23/2023

## 2023-01-23 NOTE — TELEPHONE ENCOUNTER
PATIENT IS ASKING WHY THE MEDICATION WAS CHANGED TO [employer] WHEN SHE WAS ORIGINALLY APPROVED FOR OZEMPIC, PATIENT IS WANTS TO STAY ON THE OZEMPIC BECAUSE HER PRIOR AUTH WAS APPROVED THOUGH HER INSURANCE AND SHE STATES THE OZEMPIC IS WORKING FINE FOR HER ALSO, PLEASE ADVISE PATIENT ASAP REGARDING THIS ASAP    PATIENT ALSO MENTIONED THAT HER INSURANCE STATES THEY NEED A PRIOR AUTH ON THE [employer] AND IT WILL TAKE A LONG TIME TO GET THAT.      CONTACT: [phone] (Mobile)

## 2023-01-24 ENCOUNTER — HOSPITAL ENCOUNTER (OUTPATIENT)
Dept: OCCUPATIONAL THERAPY | Facility: HOSPITAL | Age: 53
Setting detail: THERAPIES SERIES
Discharge: HOME | End: 2023-01-24
Payer: COMMERCIAL

## 2023-01-24 DIAGNOSIS — I89.0 LYMPHEDEMA OF BOTH LOWER EXTREMITIES: Primary | ICD-10-CM

## 2023-01-24 PROCEDURE — 97140 MANUAL THERAPY 1/> REGIONS: CPT

## 2023-01-24 NOTE — THERAPY TREATMENT NOTE
Outpatient Occupational Therapy Lymphedema Treatment Note  [facility]     Patient Name: Kiera Avalos  : 1970  MRN: [id number]  Today's Date: 2023      Visit Date: 2023    Patient Active Problem List   Diagnosis   • Gastroesophageal reflux disease   • History of bariatric surgery   • Iron deficiency anemia   • Seizure ([facility])   • Vitamin B12 deficiency   • Vitamin D deficiency   • Chronic constipation   • Pure hypercholesterolemia   • H/O gastric sleeve   • Encounter for screening colonoscopy   • Class 2 severe obesity with serious comorbidity and body mass index (BMI) of 35.0 to 35.9 in adult (HCC)        Past Medical History:   Diagnosis Date   • Anemia         Past Surgical History:   Procedure Laterality Date   • BARIATRIC SURGERY     •  SECTION     • CHOLECYSTECTOMY     • COLONOSCOPY N/A 2022    Procedure: COLONOSCOPY;  Surgeon: Troy Pittman MD;  Location: [facility] [place]IN OR;  Service: Gastroenterology;  Laterality: N/A;  normal   • TONSILLECTOMY     • TUBAL ABDOMINAL LIGATION           Visit Dx:      ICD-10-CM ICD-9-CM   1. Lymphedema of both lower extremities  I89.0 457.1        Lymphedema     Row Name 23 1200             Subjective Pain    Able to rate subjective pain? yes  -CW      Pre-Treatment Pain Level 0  -CW      Post-Treatment Pain Level 0  -CW         Subjective Comments    Subjective Comments Tightness  BLE's  -CW         Manual Lymphatic Drainage    Manual Lymphatic Drainage initial sequence;opened regional lymph nodes;opened anastamoses;extremity treatment  -CW      Initial Sequence short neck;abdomen  -CW      Opened Regional Lymph Nodes axillary;inguinal  -CW      Axillary left;right  -CW      Inguinal left;right  -CW      Opened Anastamoses inguino-axillary  -CW      Extremity Treatment MLD to full limb  -CW      MLD to Full Limb BLE''s  -CW         Compression/Skin Care    Compression/Skin Care skin care;wrapping  location;bandaging;compression garment;remove bandages  -CW      Skin Care moisturizing lotion applied  -CW      Wrapping Location lower extremity  -CW      Wrapping Location LE bilateral:;foot to knee  -CW      Wrapping Comments Tg 9, 1- 10cm 1- 15 cm. Artiflex, 1- 8cm. 1- 10 cm Rosidal.  -CW      Bandage Layers cotton liner;padding/fluff layer;short-stretch bandages (comment size/quantity)  -CW      Bandaging Technique light compression  -CW            User Key  (r) = Recorded By, (t) = Taken By, (c) = Cosigned By    Initials Name Provider Type    Clara Barrios OTR Occupational Therapist                         OT Assessment/Plan     Row Name 01/24/23 1416          OT Assessment    Assessment Comments Pt. reports no increassed BLE pain. Skin intact with no rashes or skin irritation. Pt. tolerated MLD well. Applied the bandages with light compression. Reviewed bandage precautions and wearing schedule. Pt. can remove top bandage layer at night if needed for comfort. Encouraged BLE elevation.  -CW        OT Plan    OT Plan Comments Plan to cont CDT 4x/week and progress to indep. lymph. managment.  -CW           User Key  (r) = Recorded By, (t) = Taken By, (c) = Cosigned By    Initials Name Provider Type    Clara Barrios OTR Occupational Therapist                    Manual Rx (last 36 hours)     Manual Treatments     Row Name 01/24/23 1418             Total Minutes    72631 - OT Manual Therapy Minutes 58  -CW            User Key  (r) = Recorded By, (t) = Taken By, (c) = Cosigned By    Initials Name Provider Type    Clara Barrios OTR Occupational Therapist                  Therapy Education  Given: Edema management, Symptoms/condition management, Bandaging/dressing change  Program: New, Reinforced  How Provided: Verbal, Demonstration  Provided to: Patient  Level of Understanding: Verbalized                Time Calculation:   OT Start Time: 1232  OT Stop Time: 1330  OT Time Calculation (min): 58 min  Total  Timed Code Minutes- OT: 58 minute(s)  Timed Charges  22872 - OT Manual Therapy Minutes: 58  Total Minutes  Timed Charges Total Minutes: 58   Total Minutes: 58     Therapy Charges for Today     Code Description Service Date Service Provider Modifiers Qty    [id number]  OT MANUAL THERAPY EA 15 MIN 1/24/2023 Clara Cast OTR GO 4                      ZORAIDA Jason  1/24/2023

## 2023-01-25 ENCOUNTER — HOSPITAL ENCOUNTER (OUTPATIENT)
Dept: OCCUPATIONAL THERAPY | Facility: HOSPITAL | Age: 53
Setting detail: THERAPIES SERIES
Discharge: HOME | End: 2023-01-25
Payer: COMMERCIAL

## 2023-01-25 DIAGNOSIS — I89.0 LYMPHEDEMA OF BOTH LOWER EXTREMITIES: Primary | ICD-10-CM

## 2023-01-25 PROCEDURE — 97140 MANUAL THERAPY 1/> REGIONS: CPT

## 2023-01-25 NOTE — THERAPY TREATMENT NOTE
Outpatient Occupational Therapy Lymphedema Treatment Note  [facility]     Patient Name: Kiera Avalos  : 1970  MRN: [id number]  Today's Date: 2023      Visit Date: 2023    Patient Active Problem List   Diagnosis   • Gastroesophageal reflux disease   • History of bariatric surgery   • Iron deficiency anemia   • Seizure (HCC)   • Vitamin B12 deficiency   • Vitamin D deficiency   • Chronic constipation   • Pure hypercholesterolemia   • H/O gastric sleeve   • Encounter for screening colonoscopy   • Class 2 severe obesity with serious comorbidity and body mass index (BMI) of 35.0 to 35.9 in adult (HCC)        Past Medical History:   Diagnosis Date   • Anemia         Past Surgical History:   Procedure Laterality Date   • BARIATRIC SURGERY     •  SECTION     • CHOLECYSTECTOMY     • COLONOSCOPY N/A 2022    Procedure: COLONOSCOPY;  Surgeon: Troy Pittman MD;  [place] [facility] [place]  Service: Gastroenterology;  Laterality: N/A;  normal   • TONSILLECTOMY     • TUBAL ABDOMINAL LIGATION           Visit Dx:      ICD-10-CM ICD-9-CM   1. Lymphedema of both lower extremities  I89.0 457.1        Lymphedema     Row Name 23 1300             Subjective Pain    Able to rate subjective pain? yes  -CW      Pre-Treatment Pain Level 0  -CW      Post-Treatment Pain Level 0  -CW         Subjective Comments    Subjective Comments No increased LE pain at present.  -CW         Skin Changes/Observations    Skin Observations Comment normal color; small varicosities  -CW         Manual Lymphatic Drainage    Manual Lymphatic Drainage initial sequence;opened regional lymph nodes;opened anastamoses;extremity treatment  -CW      Initial Sequence short neck;abdomen  -CW      Abdomen superficial  -CW      Opened Regional Lymph Nodes axillary;inguinal  -CW      Axillary left;right  -CW      Inguinal left;right  -CW      Opened Anastamoses inguino-axillary  -CW      Extremity Treatment MLD  to full limb  -CW      MLD to Full Limb BLE's  -CW         Compression/Skin Care    Compression/Skin Care skin care;wrapping location;bandaging;compression garment;remove bandages  -CW      Skin Care moisturizing lotion applied  -CW      Wrapping Location lower extremity  -CW      Wrapping Location LE bilateral:;foot to knee  -CW      Wrapping Comments Tg 9, 1- 10cm 1- 15 cm. Artiflex, 1- 8cm. 1- 10 cm Rosidal.  -CW      Bandage Layers cotton liner;padding/fluff layer;short-stretch bandages (comment size/quantity)  -CW      Bandaging Technique moderate compression  -CW            User Key  (r) = Recorded By, (t) = Taken By, (c) = Cosigned By    Initials Name Provider Type    Clara Barrios OTR Occupational Therapist                         OT Assessment/Plan     Row Name 01/25/23 1356          OT Assessment    Assessment Comments Pt. was able to tolerate the compression bandages day and night. No increased pain complaints at present.  Skin dry and intact with no skin rashes or irritation. Applied the bandages with slightly more compression as tolerated with no discomfort. Reviewed bandage precautions, wearing schedule and skin care. Briefly  sequence and technique on how to apply the compression bandages. Pt. can decrease compression or remove a bandage layer at night if needed for comfort. Edema decreased B mid calf per observation. Ed. completed for bandage precautions and wearing schedule.  -CW        OT Plan    OT Plan Comments Plan to cont. CDT 4x/week and progress to indep. lymph. management.  -CW           User Key  (r) = Recorded By, (t) = Taken By, (c) = Cosigned By    Initials Name Provider Type    Clara Barrios OTR Occupational Therapist                    Manual Rx (last 36 hours)     Manual Treatments     Row Name 01/25/23 1359             Total Minutes    25168 - OT Manual Therapy Minutes 46  -CW            User Key  (r) = Recorded By, (t) = Taken By, (c) = Cosigned By    Initials Name Provider  Type    CW Clara Cast OTR Occupational Therapist                  Therapy Education  Given: Edema management, Symptoms/condition management, Bandaging/dressing change  Program: New, Reinforced  How Provided: Verbal, Demonstration, Written  Provided to: Patient  Level of Understanding: Verbalized                Time Calculation:   OT Start Time: 1242  OT Stop Time: 1328  OT Time Calculation (min): 46 min  Total Timed Code Minutes- OT: 46 minute(s)  Timed Charges  52455 - OT Manual Therapy Minutes: 46  Total Minutes  Timed Charges Total Minutes: 46   Total Minutes: 46     Therapy Charges for Today     Code Description Service Date Service Provider Modifiers Qty    [id number]  OT MANUAL THERAPY EA 15 MIN 1/25/2023 Clara Cast OTR GO 3                      ZORAIDA Jason  1/25/2023

## 2023-01-27 ENCOUNTER — HOSPITAL ENCOUNTER (OUTPATIENT)
Dept: OCCUPATIONAL THERAPY | Facility: HOSPITAL | Age: 53
Setting detail: THERAPIES SERIES
Discharge: HOME | End: 2023-01-27
Payer: COMMERCIAL

## 2023-01-27 ENCOUNTER — TELEPHONE (OUTPATIENT)
Dept: INTERNAL MEDICINE | Facility: CLINIC | Age: 53
End: 2023-01-27
Payer: COMMERCIAL

## 2023-01-27 DIAGNOSIS — I89.0 LYMPHEDEMA OF BOTH LOWER EXTREMITIES: Primary | ICD-10-CM

## 2023-01-27 PROCEDURE — 97140 MANUAL THERAPY 1/> REGIONS: CPT

## 2023-01-27 NOTE — TELEPHONE ENCOUNTER
Phoned patient to schedule follow up patient stated she did not start her medication until a month after she was seen wants to give mediation another month and she will call to make appointment.

## 2023-01-27 NOTE — THERAPY TREATMENT NOTE
Outpatient Occupational Therapy Lymphedema Treatment Note  [facility]     Patient Name: Kiera Avalos  : 1970  MRN: [id number]  Today's Date: 2023      Visit Date: 2023    Patient Active Problem List   Diagnosis   • Gastroesophageal reflux disease   • History of bariatric surgery   • Iron deficiency anemia   • Seizure (HCC)   • Vitamin B12 deficiency   • Vitamin D deficiency   • Chronic constipation   • Pure hypercholesterolemia   • H/O gastric sleeve   • Encounter for screening colonoscopy   • Class 2 severe obesity with serious comorbidity and body mass index (BMI) of 35.0 to 35.9 in adult (HCC)        Past Medical History:   Diagnosis Date   • Anemia         Past Surgical History:   Procedure Laterality Date   • BARIATRIC SURGERY     •  SECTION     • CHOLECYSTECTOMY     • COLONOSCOPY N/A 2022    Procedure: COLONOSCOPY;  Surgeon: Troy Pittman MD;  [place] [facility] [place];  Service: Gastroenterology;  Laterality: N/A;  normal   • TONSILLECTOMY     • TUBAL ABDOMINAL LIGATION           Visit Dx:      ICD-10-CM ICD-9-CM   1. Lymphedema of both lower extremities  I89.0 457.1        Lymphedema     Row Name 23 1300             Subjective Pain    Able to rate subjective pain? yes  -CW      Pre-Treatment Pain Level 0  -CW      Post-Treatment Pain Level 0  -CW         Subjective Comments    Subjective Comments No increased pain at present.  -CW         Skin Changes/Observations    Skin Observations Comment normal color; small varicosities  -CW         Manual Lymphatic Drainage    Manual Lymphatic Drainage initial sequence;opened regional lymph nodes;opened anastamoses;extremity treatment  -CW      Initial Sequence short neck;abdomen  -CW      Abdomen superficial  -CW      Opened Regional Lymph Nodes axillary;inguinal  -CW      Axillary left;right  -CW      Inguinal left;right  -CW      Opened Anastamoses inguino-axillary  -CW      Extremity Treatment MLD to  full limb  -CW      MLD to Full Limb BLE's  -CW         Compression/Skin Care    Compression/Skin Care skin care;wrapping location;bandaging;compression garment;remove bandages  -CW      Skin Care moisturizing lotion applied  -CW      Wrapping Location lower extremity  -CW      Wrapping Location LE bilateral:;foot to knee  -CW      Wrapping Comments Tg 9, 1- 10cm 1- 15 cm. Artiflex, 1- 8cm. 1- 10 cm Rosidal.  -CW      Bandage Layers cotton liner;padding/fluff layer;short-stretch bandages (comment size/quantity)  -CW      Bandaging Technique moderate compression  -CW            User Key  (r) = Recorded By, (t) = Taken By, (c) = Cosigned By    Initials Name Provider Type    Clara Barrios OTR Occupational Therapist                         OT Assessment/Plan     Row Name 01/27/23 1334          OT Assessment    Assessment Comments Pt. was able to tolerate the compression bandages day and night. No pain complaints at present when at rest.  Skin dry and intact with no skin rashes or irritation. Applied the bandages with slightly more compression as tolerated with no discomfort. Reviewed bandage precautions, wearing schedule and skin care. Reviewed sequence and technique on how to apply the compression bandages. Pt. can decrease compression or remove a bandage layer at night if needed for comfort. Reviewed care of bandages and washing instructions.  -CW        OT Plan    OT Plan Comments Plan to cont. CDT 4x/week and progress to indep. lymph. management.  -CW           User Key  (r) = Recorded By, (t) = Taken By, (c) = Cosigned By    Initials Name Provider Type    Clara Barrios OTR Occupational Therapist                    Manual Rx (last 36 hours)     Manual Treatments     Row Name 01/27/23 1336             Total Minutes    89278 - OT Manual Therapy Minutes 56  -CW            User Key  (r) = Recorded By, (t) = Taken By, (c) = Cosigned By    Initials Name Provider Type    Clara Barrios OTR Occupational Therapist                   Therapy Education  Education Details: HEP instructed.  Given: Edema management, Symptoms/condition management, Bandaging/dressing change, HEP  Program: New, Reinforced  How Provided: Verbal, Demonstration, Written  Provided to: Patient  Level of Understanding: Verbalized                Time Calculation:   OT Start Time: 1232  OT Stop Time: 1328  OT Time Calculation (min): 56 min  Total Timed Code Minutes- OT: 56 minute(s)  Timed Charges  28381 - OT Manual Therapy Minutes: 56  Total Minutes  Timed Charges Total Minutes: 56   Total Minutes: 56     Therapy Charges for Today     Code Description Service Date Service Provider Modifiers Qty    [id number] HC OT MANUAL THERAPY EA 15 MIN 1/27/2023 Clara Cast OTR GO 4                      Clara Cast OTMAXI  1/27/2023

## 2023-01-30 ENCOUNTER — HOSPITAL ENCOUNTER (OUTPATIENT)
Dept: OCCUPATIONAL THERAPY | Facility: HOSPITAL | Age: 53
Setting detail: THERAPIES SERIES
Discharge: HOME | End: 2023-01-30
Payer: COMMERCIAL

## 2023-01-30 DIAGNOSIS — I89.0 LYMPHEDEMA OF BOTH LOWER EXTREMITIES: Primary | ICD-10-CM

## 2023-01-30 PROCEDURE — 97140 MANUAL THERAPY 1/> REGIONS: CPT

## 2023-01-30 NOTE — THERAPY TREATMENT NOTE
Outpatient Occupational Therapy Lymphedema Treatment Note  [facility]     Patient Name: Kiera Avalos  : 1970  MRN: [id number]  Today's Date: 2023      Visit Date: 2023    Patient Active Problem List   Diagnosis   • Gastroesophageal reflux disease   • History of bariatric surgery   • Iron deficiency anemia   • Seizure ([facility])   • Vitamin B12 deficiency   • Vitamin D deficiency   • Chronic constipation   • Pure hypercholesterolemia   • H/O gastric sleeve   • Encounter for screening colonoscopy   • Class 2 severe obesity with serious comorbidity and body mass index (BMI) of 35.0 to 35.9 in adult (HCC)        Past Medical History:   Diagnosis Date   • Anemia         Past Surgical History:   Procedure Laterality Date   • BARIATRIC SURGERY     •  SECTION     • CHOLECYSTECTOMY     • COLONOSCOPY N/A 2022    Procedure: COLONOSCOPY;  Surgeon: Troy Pittman MD;  [place] [facility] [place];  Service: Gastroenterology;  Laterality: N/A;  normal   • TONSILLECTOMY     • TUBAL ABDOMINAL LIGATION           Visit Dx:      ICD-10-CM ICD-9-CM   1. Lymphedema of both lower extremities  I89.0 457.1        Lymphedema     Row Name 23 1200             Subjective Pain    Able to rate subjective pain? yes  -CW      Pre-Treatment Pain Level 0  -CW      Post-Treatment Pain Level 0  -CW         Subjective Comments    Subjective Comments No increased pain noted BLE's.  -CW         Skin Changes/Observations    Skin Observations Comment normal color; small varicosities  -CW         Manual Lymphatic Drainage    Manual Lymphatic Drainage initial sequence;opened regional lymph nodes;opened anastamoses;extremity treatment  -CW      Initial Sequence short neck;abdomen  -CW      Abdomen superficial  -CW      Opened Regional Lymph Nodes axillary;inguinal  -CW      Axillary left;right  -CW      Inguinal left;right  -CW      Opened Anastamoses inguino-axillary  -CW      Extremity Treatment MLD to  full limb  -CW      MLD to Full Limb BLE's  -CW         Compression/Skin Care    Compression/Skin Care skin care;wrapping location;bandaging;compression garment;remove bandages  -CW      Skin Care moisturizing lotion applied  -CW      Wrapping Location lower extremity  -CW      Wrapping Location LE bilateral:;foot to knee  -CW      Wrapping Comments Tg 9, 1- 10cm 1- 15 cm. Artiflex, 1- 8cm. 1- 10 cm Rosidal.  -CW      Bandage Layers cotton liner;padding/fluff layer;short-stretch bandages (comment size/quantity)  -CW      Bandaging Technique moderate compression  -CW            User Key  (r) = Recorded By, (t) = Taken By, (c) = Cosigned By    Initials Name Provider Type    Clara Barrios OTR Occupational Therapist                         OT Assessment/Plan     Row Name 01/30/23 1332          OT Assessment    Assessment Comments Pt. reports no increased LE pain at present. Pt.'s spouse present for tx. Skin intact with no wounds or rashes. Reviewed how to apply the bandages sequence and technique with spouse present. Applied the bandages with moderate compression as tolerated. Pt. can remove of loosen the bandages at night if needed for comfort. Reviewed bandage precautions and wearing schedule. Encouraged HEP and BLE elevation when able. Edema decreased B lower legs mid calf to ankle per observaiton.  -CW        OT Plan    OT Plan Comments Plan to cont. CDT  4x/week and progress to indep.  lymph. management.  -CW           User Key  (r) = Recorded By, (t) = Taken By, (c) = Cosigned By    Initials Name Provider Type    Clara Barrios OTR Occupational Therapist                    Manual Rx (last 36 hours)     Manual Treatments     Row Name 01/30/23 1336             Total Minutes    98180 - OT Manual Therapy Minutes 49  -CW            User Key  (r) = Recorded By, (t) = Taken By, (c) = Cosigned By    Initials Name Provider Type    Clara Barrios OTR Occupational Therapist                  Therapy  Education  Given: Edema management, Symptoms/condition management, Bandaging/dressing change  Program: Reinforced  How Provided: Verbal, Demonstration  Provided to: Patient, Caregiver  Level of Understanding: Verbalized                Time Calculation:   OT Start Time: 1220  OT Stop Time: 1309  OT Time Calculation (min): 49 min  Total Timed Code Minutes- OT: 49 minute(s)  Timed Charges  10401 - OT Manual Therapy Minutes: 49  Total Minutes  Timed Charges Total Minutes: 49   Total Minutes: 49     Therapy Charges for Today     Code Description Service Date Service Provider Modifiers Qty    [id number]  OT MANUAL THERAPY EA 15 MIN 1/30/2023 Clara Cast OTR GO 3                      Clara Cast, OTR  1/30/2023

## 2023-01-31 ENCOUNTER — APPOINTMENT (OUTPATIENT)
Dept: OCCUPATIONAL THERAPY | Facility: HOSPITAL | Age: 53
End: 2023-01-31
Payer: COMMERCIAL

## 2023-02-01 ENCOUNTER — HOSPITAL ENCOUNTER (OUTPATIENT)
Dept: OCCUPATIONAL THERAPY | Facility: HOSPITAL | Age: 53
Setting detail: THERAPIES SERIES
Discharge: HOME | End: 2023-02-01
Payer: COMMERCIAL

## 2023-02-01 DIAGNOSIS — I89.0 LYMPHEDEMA OF BOTH LOWER EXTREMITIES: Primary | ICD-10-CM

## 2023-02-01 PROCEDURE — 97140 MANUAL THERAPY 1/> REGIONS: CPT

## 2023-02-01 NOTE — THERAPY TREATMENT NOTE
Outpatient Occupational Therapy Lymphedema Treatment Note  [facility]     Patient Name: Kiera Avalos  : 1970  MRN: [id number]  Today's Date: 2023      Visit Date: 2023    Patient Active Problem List   Diagnosis   • Gastroesophageal reflux disease   • History of bariatric surgery   • Iron deficiency anemia   • Seizure ([facility])   • Vitamin B12 deficiency   • Vitamin D deficiency   • Chronic constipation   • Pure hypercholesterolemia   • H/O gastric sleeve   • Encounter for screening colonoscopy   • Class 2 severe obesity with serious comorbidity and body mass index (BMI) of 35.0 to 35.9 in adult (HCC)        Past Medical History:   Diagnosis Date   • Anemia         Past Surgical History:   Procedure Laterality Date   • BARIATRIC SURGERY     •  SECTION     • CHOLECYSTECTOMY     • COLONOSCOPY N/A 2022    Procedure: COLONOSCOPY;  Surgeon: Troy Pittman MD;  Location: [facility] [place];  Service: Gastroenterology;  Laterality: N/A;  normal   • TONSILLECTOMY     • TUBAL ABDOMINAL LIGATION           Visit Dx:      ICD-10-CM ICD-9-CM   1. Lymphedema of both lower extremities  I89.0 457.1        Lymphedema     Row Name 23 0700             Subjective Pain    Able to rate subjective pain? yes  -CW      Pre-Treatment Pain Level 0  -CW      Post-Treatment Pain Level 0  -CW         Subjective Comments    Subjective Comments Toes cramped some last night.  -CW         Skin Changes/Observations    Skin Observations Comment normal color; small varicosities  -CW         Manual Lymphatic Drainage    Manual Lymphatic Drainage initial sequence;opened regional lymph nodes;opened anastamoses;extremity treatment  -CW      Initial Sequence short neck;abdomen  -CW      Abdomen superficial  -CW      Opened Regional Lymph Nodes axillary;inguinal  -CW      Axillary left;right  -CW      Inguinal left;right  -CW      Opened Anastamoses inguino-axillary  -CW      Extremity Treatment MLD to  full limb  -CW      MLD to Full Limb BLE's  -CW         Compression/Skin Care    Compression/Skin Care skin care;wrapping location;bandaging;compression garment;remove bandages  -CW      Skin Care moisturizing lotion applied  -CW      Wrapping Location lower extremity  -CW      Wrapping Location LE bilateral:;foot to knee  -CW      Wrapping Comments Tg 9, 1- 10cm 1- 15 cm. Artiflex, 1- 8cm. 1- 10 cm Rosidal.  -CW      Bandage Layers cotton liner;padding/fluff layer;short-stretch bandages (comment size/quantity)  -CW      Bandaging Technique moderate compression  -CW            User Key  (r) = Recorded By, (t) = Taken By, (c) = Cosigned By    Initials Name Provider Type    Clara Barrios OTR Occupational Therapist                         OT Assessment/Plan     Row Name 02/01/23 1115          OT Assessment    Assessment Comments Pt. reports some toe cramps yesterday. Skin intact with no rashes or skin irritation. Pt. is tolerating the compression bandages well. Edema decreased B knees to ankles per observation. Reviewed bandage precautions and wearing schedule. Pt. can remove top bandage layer at night if needed for comfort.  -CW        OT Plan    OT Plan Comments Plan to cont CDT 4x/week and progress to indep. lymph management.  -CW           User Key  (r) = Recorded By, (t) = Taken By, (c) = Cosigned By    Initials Name Provider Type    Clara Barrios OTR Occupational Therapist                    Manual Rx (last 36 hours)     Manual Treatments     Row Name 02/01/23 1121             Total Minutes    40824 - OT Manual Therapy Minutes 58  -CW            User Key  (r) = Recorded By, (t) = Taken By, (c) = Cosigned By    Initials Name Provider Type    Clara Barrios OTR Occupational Therapist                  Therapy Education  Given: Edema management, Symptoms/condition management, Bandaging/dressing change  Program: Reinforced  How Provided: Verbal, Demonstration  Provided to: Patient  Level of Understanding:  Verbalized                Time Calculation:   OT Start Time: 0732  OT Stop Time: 0830  OT Time Calculation (min): 58 min  Total Timed Code Minutes- OT: 58 minute(s)  Timed Charges  04616 - OT Manual Therapy Minutes: 58  Total Minutes  Timed Charges Total Minutes: 58   Total Minutes: 58     Therapy Charges for Today     Code Description Service Date Service Provider Modifiers Qty    [id number] HC OT MANUAL THERAPY EA 15 MIN 2/1/2023 Clara Cast OTR GO 4                      ZORAIDA Jason  2/1/2023

## 2023-02-03 ENCOUNTER — HOSPITAL ENCOUNTER (OUTPATIENT)
Dept: OCCUPATIONAL THERAPY | Facility: HOSPITAL | Age: 53
Setting detail: THERAPIES SERIES
Discharge: HOME | End: 2023-02-03
Payer: COMMERCIAL

## 2023-02-03 DIAGNOSIS — I89.0 LYMPHEDEMA OF BOTH LOWER EXTREMITIES: Primary | ICD-10-CM

## 2023-02-03 PROCEDURE — 97140 MANUAL THERAPY 1/> REGIONS: CPT

## 2023-02-03 NOTE — THERAPY PROGRESS REPORT/RE-CERT
Outpatient Occupational Therapy Lymphedema Progress Note  [facility]     Patient Name: Kiera Avalos  : 1970  MRN: [id number]  Today's Date: 2/3/2023      Visit Date: 2023    Patient Active Problem List   Diagnosis   • Gastroesophageal reflux disease   • History of bariatric surgery   • Iron deficiency anemia   • Seizure ([facility])   • Vitamin B12 deficiency   • Vitamin D deficiency   • Chronic constipation   • Pure hypercholesterolemia   • H/O gastric sleeve   • Encounter for screening colonoscopy   • Class 2 severe obesity with serious comorbidity and body mass index (BMI) of 35.0 to 35.9 in adult (HCC)        Past Medical History:   Diagnosis Date   • Anemia         Past Surgical History:   Procedure Laterality Date   • BARIATRIC SURGERY     •  SECTION     • CHOLECYSTECTOMY     • COLONOSCOPY N/A 2022    Procedure: COLONOSCOPY;  Surgeon: Tory Pittman MD;  Location: [facility] MAIN OR;  Service: Gastroenterology;  Laterality: N/A;  normal   • TONSILLECTOMY     • TUBAL ABDOMINAL LIGATION           Visit Dx:      ICD-10-CM ICD-9-CM   1. Lymphedema of both lower extremities  I89.0 457.1        Lymphedema     Row Name 23 0700             Subjective Pain    Able to rate subjective pain? yes  -CW      Pre-Treatment Pain Level 0  -CW      Post-Treatment Pain Level 0  -CW         Subjective Comments    Subjective Comments No reports of increased pain.  -CW         Skin Changes/Observations    Skin Observations Comment normal color; small varicosities  -CW         Lymphedema Measurements    Measurement Type(s) Circumferential  -CW      Circumferential Areas Lower extremities  -CW         BLE Circumferential (cm)    Measurement Location 1 knee  -CW      Left 1 40.2 cm  -CW      Right 1 38.2 cm  -CW      Measurement Location 2 10 cm below knee  -CW      Left 2 42 cm  -CW      Right 2 42 cm  -CW      Measurement Location 3 20 cm below knee  -CW      Left 3 35 cm  -CW       Right 3 38 cm  -CW      Measurement Location 4 30 cm below knee  -CW      Left 4 30 cm  -CW      Right 4 32 cm  -CW      Measurement Location 5 Ankle  -CW      Left 5 27 cm  -CW      Right 5 27 cm  -CW      Measurement Location 6 mid foot  -CW      Left 6 20 cm  -CW      Right 6 20.1 cm  -CW      LLE Circumferential Total 194.2 cm  -CW      RLE Circumferential Total 197.3 cm  -CW         Manual Lymphatic Drainage    Manual Lymphatic Drainage initial sequence;opened regional lymph nodes;opened anastamoses;extremity treatment  -CW      Initial Sequence short neck;abdomen  -CW      Abdomen superficial  -CW      Opened Regional Lymph Nodes axillary;inguinal  -CW      Axillary left;right  -CW      Inguinal left;right  -CW      Opened Anastamoses inguino-axillary  -CW      Extremity Treatment MLD to full limb  -CW      MLD to Full Limb BLE's  -CW         Compression/Skin Care    Compression/Skin Care skin care;wrapping location;bandaging;compression garment;remove bandages  -CW      Skin Care moisturizing lotion applied  -CW      Wrapping Location lower extremity  -CW      Wrapping Location LE bilateral:;foot to knee  -CW      Wrapping Comments Tg 9, 1- 10cm 1- 15 cm. Artiflex, 1- 8cm. 2- 10 cm Rosidal.  -CW      Bandage Layers cotton liner;padding/fluff layer;short-stretch bandages (comment size/quantity)  -CW      Bandaging Technique moderate compression  -CW            User Key  (r) = Recorded By, (t) = Taken By, (c) = Cosigned By    Initials Name Provider Type    Clara Barrios OTR Occupational Therapist                         OT Assessment/Plan     Row Name 02/03/23 1020          OT Assessment    Assessment Comments No increased pain complaints at present. Pt. was able to wear the bandages day and night. Skin dry and intact with no wounds or skin irritation. Measurements taken. See flow sheet. Total circumference .3 cm. and .2 cm. (9.0 cm. R and 9.5 cm. L net decrease). Pt. is progressing with  "decreased edema BLE’s. Goals updated and reviewed POC with pt. Reviewed bandages precautions and wearing schedule. Applied the wraps with mod compression as tolerated and added another 10 cm. Rosidal. Plan  -CW        OT Plan    OT Plan Comments Plan to cont. CDT 4x/week and progress to indep. lymph. management.  -CW           User Key  (r) = Recorded By, (t) = Taken By, (c) = Cosigned By    Initials Name Provider Type    Clara Barrios OTR Occupational Therapist                    Manual Rx (last 36 hours)     Manual Treatments     Row Name 02/03/23 1026             Total Minutes    27141 - OT Manual Therapy Minutes 58  -CW            User Key  (r) = Recorded By, (t) = Taken By, (c) = Cosigned By    Initials Name Provider Type    Clara Barrios OTR Occupational Therapist               OT Goals     Row Name 02/03/23 1000          OT Short Term Goals    STG Date to Achieve 02/17/23  -CW     STG 1 Patient independent and compliant with self-wrapping techniques of compression bandages with assistance of caregiver as needed for improved self-management of condition.  -CW     STG 1 Progress Ongoing;Progressing  -CW     STG 2 Patient will demonstrate proper awareness of “What is Lymphedema?” and \"Healthy Habits\" for improved prevention, management, care of symptoms and ease of transition to self-care of condition.  -CW     STG 2 Progress Progressing  -CW     STG 3 Patient will demonstrate decreased net edema of bilateral lower extremities >/= 3-7cm  for decrease in edema, symptoms, decreased risk of infection and improved skin care/transition to self-care of condition.  -CW     STG 3 Progress Met  -CW        Long Term Goals    LTG Date to Achieve 03/03/23  -CW     LTG 1 Patient independent and compliant with self-massage/soft tissue techniques with spouse/family member as needed for improved lymphatic drainage, decreased edema/symptoms, decreased risk of infection and improved transition to self-care of condition.  " -CW     LTG 1 Progress Ongoing  -CW     LTG 2 Patient will demonstrate decreased net edema of bilateral lower extremities >/= 8-15 cm  for decrease in edema, symptoms, decreased risk of infection and improved skin care/transition to self-care of condition.  -CW     LTG 2 Progress Ongoing;Progressing  -CW     LTG 3 Patient independent and compliant with initial home exercise program focused on diaphragmatic breathing, range of motion, flexibility to decrease edema and improve lymphatic flow for decreased edema and decreased risk of infection.  -CW     LTG 3 Progress Met  -CW     LTG 4 Patient independent and compliant with use and care of compression garments and/or Velcro products with assistance of a caregiver as needed to promote self-care independence.  -CW     LTG 4 Progress Ongoing  -CW     LTG 5 Patient to improve LLIS by 10 points in 4 weeks as an indicator of improved quality of life.  -CW     LTG 5 Progress Ongoing  -CW           User Key  (r) = Recorded By, (t) = Taken By, (c) = Cosigned By    Initials Name Provider Type    Clara Barrios OTR Occupational Therapist                Therapy Education  Given: Edema management, Symptoms/condition management, Bandaging/dressing change  Program: Reinforced, Progressed  How Provided: Verbal, Demonstration  Provided to: Patient  Level of Understanding: Verbalized                Time Calculation:   OT Start Time: 0735  OT Stop Time: 0833  OT Time Calculation (min): 58 min  Total Timed Code Minutes- OT: 58 minute(s)  Timed Charges  50395 - OT Manual Therapy Minutes: 58  Total Minutes  Timed Charges Total Minutes: 58   Total Minutes: 58     Therapy Charges for Today     Code Description Service Date Service Provider Modifiers Qty    [id number]  OT MANUAL THERAPY EA 15 MIN 2/3/2023 Clara Cast OTR GO 4                      ZORAIDA Jason  2/3/2023

## 2023-02-06 ENCOUNTER — HOSPITAL ENCOUNTER (OUTPATIENT)
Dept: OCCUPATIONAL THERAPY | Facility: HOSPITAL | Age: 53
Setting detail: THERAPIES SERIES
Discharge: HOME | End: 2023-02-06
Payer: COMMERCIAL

## 2023-02-06 DIAGNOSIS — I89.0 LYMPHEDEMA OF BOTH LOWER EXTREMITIES: Primary | ICD-10-CM

## 2023-02-06 PROCEDURE — 97140 MANUAL THERAPY 1/> REGIONS: CPT

## 2023-02-06 NOTE — THERAPY TREATMENT NOTE
Outpatient Occupational Therapy Lymphedema Treatment Note  [facility]     Patient Name: Kiera Avalos  : 1970  MRN: [id number]  Today's Date: 2023      Visit Date: 2023    Patient Active Problem List   Diagnosis   • Gastroesophageal reflux disease   • History of bariatric surgery   • Iron deficiency anemia   • Seizure ([facility])   • Vitamin B12 deficiency   • Vitamin D deficiency   • Chronic constipation   • Pure hypercholesterolemia   • H/O gastric sleeve   • Encounter for screening colonoscopy   • Class 2 severe obesity with serious comorbidity and body mass index (BMI) of 35.0 to 35.9 in adult (HCC)        Past Medical History:   Diagnosis Date   • Anemia         Past Surgical History:   Procedure Laterality Date   • BARIATRIC SURGERY     •  SECTION     • CHOLECYSTECTOMY     • COLONOSCOPY N/A 2022    Procedure: COLONOSCOPY;  Surgeon: Troy Pittman MD;  [place] [facility] [place];  Service: Gastroenterology;  Laterality: N/A;  normal   • TONSILLECTOMY     • TUBAL ABDOMINAL LIGATION           Visit Dx:      ICD-10-CM ICD-9-CM   1. Lymphedema of both lower extremities  I89.0 457.1        Lymphedema     Row Name 23 0700             Subjective Pain    Able to rate subjective pain? yes  -CW      Pre-Treatment Pain Level 0  -CW      Post-Treatment Pain Level 0  -CW         Subjective Comments    Subjective Comments No increased LE pain at present. Pt. reports some discomfort over the weekend below B knees.  -CW         Skin Changes/Observations    Skin Observations Comment normal color; small varicosities  -CW         Manual Lymphatic Drainage    Manual Lymphatic Drainage initial sequence;opened regional lymph nodes;opened anastamoses;extremity treatment  -CW      Initial Sequence short neck;abdomen  -CW      Abdomen superficial  -CW      Opened Regional Lymph Nodes axillary;inguinal  -CW      Axillary left;right  -CW      Inguinal left;right  -CW      Opened  Anastamoses inguino-axillary  -CW      Extremity Treatment MLD to full limb  -CW      MLD to Full Limb BLE's  -CW         Compression/Skin Care    Compression/Skin Care skin care;wrapping location;bandaging;compression garment;remove bandages  -CW      Skin Care moisturizing lotion applied  -CW      Wrapping Location lower extremity  -CW      Wrapping Location LE bilateral:;foot to knee  -CW      Wrapping Comments Tg 9, 1- 10cm 1- 15 cm. Artiflex, 1- 8cm. 2- 10 cm Rosidal.  -CW      Bandage Layers cotton liner;padding/fluff layer;short-stretch bandages (comment size/quantity)  -CW      Bandaging Technique moderate compression  -CW            User Key  (r) = Recorded By, (t) = Taken By, (c) = Cosigned By    Initials Name Provider Type    Clara Barrios OTR Occupational Therapist                         OT Assessment/Plan     Row Name 02/06/23 1000          OT Assessment    Assessment Comments Pt. reports some discomfort over the weekend below knees bilateraly. Removed the top bandage layer at night for comfort. Edema decreased when the wraps were removed. Skin intact with no increased irritation or rashes. Reviewed bandage precautions and wearing schedule. Pt. can removed top bandage layer as needed for comfort at night. Pt. is progressing well with decreased edema BLE's.  -CW        OT Plan    OT Plan Comments Plan to cont. CDT 4x/week and progress to indep. lymph. management.  -CW           User Key  (r) = Recorded By, (t) = Taken By, (c) = Cosigned By    Initials Name Provider Type    Clara Barrios OTR Occupational Therapist                    Manual Rx (last 36 hours)     Manual Treatments     Row Name 02/06/23 1004             Total Minutes    11369 - OT Manual Therapy Minutes 55  -CW            User Key  (r) = Recorded By, (t) = Taken By, (c) = Cosigned By    Initials Name Provider Type    lCara Barrios OTR Occupational Therapist                  Therapy Education  Given: Edema management,  Symptoms/condition management, Bandaging/dressing change  Program: Reinforced  How Provided: Verbal, Demonstration  Provided to: Patient  Level of Understanding: Verbalized                Time Calculation:   OT Start Time: 0736  OT Stop Time: 0831  OT Time Calculation (min): 55 min  Total Timed Code Minutes- OT: 55 minute(s)  Timed Charges  50554 - OT Manual Therapy Minutes: 55  Total Minutes  Timed Charges Total Minutes: 55   Total Minutes: 55     Therapy Charges for Today     Code Description Service Date Service Provider Modifiers Qty    [id number]  OT MANUAL THERAPY EA 15 MIN 2/6/2023 Clara Cast, OTMAXI GO 4                      Clara Cast, OTR  2/6/2023

## 2023-02-07 ENCOUNTER — APPOINTMENT (OUTPATIENT)
Dept: OCCUPATIONAL THERAPY | Facility: HOSPITAL | Age: 53
End: 2023-02-07
Payer: COMMERCIAL

## 2023-02-08 ENCOUNTER — HOSPITAL ENCOUNTER (OUTPATIENT)
Dept: OCCUPATIONAL THERAPY | Facility: HOSPITAL | Age: 53
Setting detail: THERAPIES SERIES
Discharge: HOME | End: 2023-02-08
Payer: COMMERCIAL

## 2023-02-08 DIAGNOSIS — I89.0 LYMPHEDEMA OF BOTH LOWER EXTREMITIES: Primary | ICD-10-CM

## 2023-02-08 PROCEDURE — 97140 MANUAL THERAPY 1/> REGIONS: CPT

## 2023-02-08 NOTE — THERAPY PROGRESS REPORT/RE-CERT
Outpatient Occupational Therapy Lymphedema Progress Note  [facility]     Patient Name: Kiera Avalos  : 1970  MRN: [id number]  Today's Date: 2023      Visit Date: 2023    Patient Active Problem List   Diagnosis   • Gastroesophageal reflux disease   • History of bariatric surgery   • Iron deficiency anemia   • Seizure ([facility])   • Vitamin B12 deficiency   • Vitamin D deficiency   • Chronic constipation   • Pure hypercholesterolemia   • H/O gastric sleeve   • Encounter for screening colonoscopy   • Class 2 severe obesity with serious comorbidity and body mass index (BMI) of 35.0 to 35.9 in adult (HCC)        Past Medical History:   Diagnosis Date   • Anemia         Past Surgical History:   Procedure Laterality Date   • BARIATRIC SURGERY     •  SECTION     • CHOLECYSTECTOMY     • COLONOSCOPY N/A 2022    Procedure: COLONOSCOPY;  Surgeon: Troy Pittman MD;  [place] [facility] [place]  Service: Gastroenterology;  Laterality: N/A;  normal   • TONSILLECTOMY     • TUBAL ABDOMINAL LIGATION           Visit Dx:      ICD-10-CM ICD-9-CM   1. Lymphedema of both lower extremities  I89.0 457.1        Lymphedema     Row Name 23 0700             Subjective Pain    Able to rate subjective pain? yes  -CW      Pre-Treatment Pain Level 0  -CW      Post-Treatment Pain Level 0  -CW         Subjective Comments    Subjective Comments Some pain LLE yesterday.Had to remove top bandage layer.  -CW         Skin Changes/Observations    Skin Observations Comment normal color; small varicosities  -CW         Lymphedema Measurements    Measurement Type(s) Circumferential  -CW      Circumferential Areas Lower extremities  -CW         BLE Circumferential (cm)    Measurement Location 1 knee  -CW      Left 1 40.1 cm  -CW      Right 1 38 cm  -CW      Measurement Location 2 10 cm below knee  -CW      Left 2 42 cm  -CW      Right 2 41 cm  -CW      Measurement Location 3 20 cm below knee  -CW       Left 3 36.5 cm  -CW      Right 3 37.9 cm  -CW      Measurement Location 4 30 cm. below knee  -CW      Left 4 30 cm  -CW      Right 4 28.9 cm  -CW      Measurement Location 5 Ankle  -CW      Left 5 27 cm  -CW      Right 5 26.7 cm  -CW      Measurement Location 6 mid foot  -CW      Left 6 19.6 cm  -CW      Right 6 20 cm  -CW      LLE Circumferential Total 195.2 cm  -CW      RLE Circumferential Total 192.5 cm  -CW         Manual Lymphatic Drainage    Manual Lymphatic Drainage initial sequence;opened regional lymph nodes;opened anastamoses;extremity treatment  -CW      Initial Sequence short neck;abdomen  -CW      Abdomen superficial  -CW      Opened Regional Lymph Nodes axillary;inguinal  -CW      Axillary left;right  -CW      Inguinal left;right  -CW      Opened Anastamoses inguino-axillary  -CW      Extremity Treatment MLD to full limb  -CW      MLD to Full Limb BLE's  -CW         Compression/Skin Care    Compression/Skin Care skin care;wrapping location;bandaging;compression garment;remove bandages  -CW      Skin Care moisturizing lotion applied  -CW      Wrapping Location lower extremity  -CW      Wrapping Location LE bilateral:;foot to knee  -CW      Wrapping Comments Tg 9, 1- 10cm 1- 15 cm. Artiflex, 1- 8cm. 2- 10 cm Rosidal.  -CW      Bandage Layers cotton liner;padding/fluff layer;short-stretch bandages (comment size/quantity)  -CW      Bandaging Technique moderate compression  -CW            User Key  (r) = Recorded By, (t) = Taken By, (c) = Cosigned By    Initials Name Provider Type    CW Clara Cast OTR Occupational Therapist                         OT Assessment/Plan     Row Name 02/08/23 1246          OT Assessment    Assessment Comments No pain complaints at present, but did have some pain yesterday LLE and  had to remove top bandage layer. Pt. was able to wear the bandages during the day and lighter compression at night. Skin not as dry and intact with no wounds or skin irritation. Measurements taken.  "See flow sheet. Total circumference GZN357.5 cm. and ..2 cm. (13.8 cm. R and 8.5 cm L net decrease). Pt. is progressing with decreased edema BLE’s ( RLE >decrease today).  Goals updated and reviewed POC with pt. Reviewed bandage precautions and wearing schedule.  -CW        OT Plan    OT Plan Comments Plan to cont. CDT 4x/week and progress to indep. lymph. management.  -CW           User Key  (r) = Recorded By, (t) = Taken By, (c) = Cosigned By    Initials Name Provider Type    Clara Barrios OTR Occupational Therapist                    Manual Rx (last 36 hours)     Manual Treatments     Row Name 02/08/23 1251             Total Minutes    88981 - OT Manual Therapy Minutes 58  -CW            User Key  (r) = Recorded By, (t) = Taken By, (c) = Cosigned By    Initials Name Provider Type    Clara Barrios OTR Occupational Therapist               OT Goals     Row Name 02/08/23 1200          OT Short Term Goals    STG Date to Achieve 02/17/23  -CW     STG 1 Patient independent and compliant with self-wrapping techniques of compression bandages with assistance of caregiver as needed for improved self-management of condition.  -CW     STG 1 Progress Ongoing;Progressing  -CW     STG 2 Patient will demonstrate proper awareness of “What is Lymphedema?” and \"Healthy Habits\" for improved prevention, management, care of symptoms and ease of transition to self-care of condition.  -CW     STG 2 Progress Progressing  -CW     STG 3 Patient will demonstrate decreased net edema of bilateral lower extremities >/= 3-7cm  for decrease in edema, symptoms, decreased risk of infection and improved skin care/transition to self-care of condition.  -CW     STG 3 Progress Met  -CW        Long Term Goals    LTG Date to Achieve 03/03/23  -CW     LTG 1 Patient independent and compliant with self-massage/soft tissue techniques with spouse/family member as needed for improved lymphatic drainage, decreased edema/symptoms, decreased risk " of infection and improved transition to self-care of condition.  -CW     LTG 1 Progress Ongoing  -CW     LTG 2 Patient will demonstrate decreased net edema of bilateral lower extremities >/= 8-15 cm  for decrease in edema, symptoms, decreased risk of infection and improved skin care/transition to self-care of condition.  -CW     LTG 2 Progress Ongoing;Progressing  -CW     LTG 3 Patient independent and compliant with initial home exercise program focused on diaphragmatic breathing, range of motion, flexibility to decrease edema and improve lymphatic flow for decreased edema and decreased risk of infection.  -CW     LTG 3 Progress Met  -CW     LTG 4 Patient independent and compliant with use and care of compression garments and/or Velcro products with assistance of a caregiver as needed to promote self-care independence.  -CW     LTG 4 Progress Ongoing  -CW     LTG 5 Patient to improve LLIS by 10 points in 4 weeks as an indicator of improved quality of life.  -CW     LTG 5 Progress Ongoing  -CW           User Key  (r) = Recorded By, (t) = Taken By, (c) = Cosigned By    Initials Name Provider Type    Clara Barrios OTR Occupational Therapist                Therapy Education  Given: Edema management, Symptoms/condition management, Bandaging/dressing change  Program: Reinforced, Progressed  How Provided: Verbal, Demonstration  Provided to: Patient  Level of Understanding: Verbalized                Time Calculation:   OT Start Time: 0730  OT Stop Time: 0828  OT Time Calculation (min): 58 min  Total Timed Code Minutes- OT: 58 minute(s)  Timed Charges  44742 - OT Manual Therapy Minutes: 58  Total Minutes  Timed Charges Total Minutes: 58   Total Minutes: 58     Therapy Charges for Today     Code Description Service Date Service Provider Modifiers Qty    [id number]  OT MANUAL THERAPY EA 15 MIN 2/8/2023 Clara Cast OTR GO 4                      ZORAIDA Jason  2/8/2023

## 2023-02-10 ENCOUNTER — APPOINTMENT (OUTPATIENT)
Dept: OCCUPATIONAL THERAPY | Facility: HOSPITAL | Age: 53
End: 2023-02-10
Payer: COMMERCIAL

## 2023-02-13 ENCOUNTER — HOSPITAL ENCOUNTER (OUTPATIENT)
Dept: OCCUPATIONAL THERAPY | Facility: HOSPITAL | Age: 53
Setting detail: THERAPIES SERIES
Discharge: HOME | End: 2023-02-13
Payer: COMMERCIAL

## 2023-02-13 DIAGNOSIS — I89.0 LYMPHEDEMA OF BOTH LOWER EXTREMITIES: Primary | ICD-10-CM

## 2023-02-13 PROCEDURE — 97140 MANUAL THERAPY 1/> REGIONS: CPT

## 2023-02-13 NOTE — THERAPY TREATMENT NOTE
Outpatient Occupational Therapy Lymphedema Treatment Note  [facility]     Patient Name: Kiera Avalos  : 1970  MRN: [id number]  Today's Date: 2023      Visit Date: 2023    Patient Active Problem List   Diagnosis   • Gastroesophageal reflux disease   • History of bariatric surgery   • Iron deficiency anemia   • Seizure ([facility])   • Vitamin B12 deficiency   • Vitamin D deficiency   • Chronic constipation   • Pure hypercholesterolemia   • H/O gastric sleeve   • Encounter for screening colonoscopy   • Class 2 severe obesity with serious comorbidity and body mass index (BMI) of 35.0 to 35.9 in adult (HCC)        Past Medical History:   Diagnosis Date   • Anemia         Past Surgical History:   Procedure Laterality Date   • BARIATRIC SURGERY     •  SECTION     • CHOLECYSTECTOMY     • COLONOSCOPY N/A 2022    Procedure: COLONOSCOPY;  Surgeon: Troy Pittman MD;  [place] [facility] [place];  Service: Gastroenterology;  Laterality: N/A;  normal   • TONSILLECTOMY     • TUBAL ABDOMINAL LIGATION           Visit Dx:      ICD-10-CM ICD-9-CM   1. Lymphedema of both lower extremities  I89.0 457.1        Lymphedema     Row Name 23 0700             Subjective Pain    Able to rate subjective pain? yes  -CW      Pre-Treatment Pain Level 0  -CW      Post-Treatment Pain Level 0  -CW         Subjective Comments    Subjective Comments No LE pain at present. Reports at times her legs feel tired and heavy.  -CW         Skin Changes/Observations    Skin Observations Comment normal color; small varicosities  -CW         Manual Lymphatic Drainage    Manual Lymphatic Drainage initial sequence;opened regional lymph nodes;opened anastamoses;extremity treatment  -CW      Initial Sequence short neck;abdomen  -CW      Abdomen superficial  -CW      Opened Regional Lymph Nodes axillary;inguinal  -CW      Axillary left;right  -CW      Inguinal left;right  -CW      Opened Anastamoses  inguino-axillary  -CW      Extremity Treatment MLD to full limb  -CW      MLD to Full Limb BLE's  -CW         Compression/Skin Care    Compression/Skin Care skin care;wrapping location;bandaging;compression garment;remove bandages  -CW      Skin Care moisturizing lotion applied  -CW      Wrapping Location lower extremity  -CW      Wrapping Location LE bilateral:;foot to knee  -CW      Wrapping Comments Tg 9, 1- 10cm 1- 15 cm. Artiflex, 1- 8cm. 2- 10 cm Rosidal.  -CW      Bandage Layers cotton liner;padding/fluff layer;short-stretch bandages (comment size/quantity)  -CW      Bandaging Technique moderate compression  -CW            User Key  (r) = Recorded By, (t) = Taken By, (c) = Cosigned By    Initials Name Provider Type    Clara Barrios OTR Occupational Therapist                         OT Assessment/Plan     Row Name 02/13/23 0837          OT Assessment    Assessment Comments Pt. reports no LE pain at present, but at times LE's feel tired and heavy. Skin intact with no rashes or skin irritation. Pt. was able to wear the bandages all night and did not have to remove top bandage layer. Applied the bandages with mod compression as tolerated. Reviewed bandage precautions and wearing schedule. Edema cont. decreased B ankles to mid calf. Plan to remeasure Wednesday.  -CW        OT Plan    OT Plan Comments Plan to cont. CDT 4x/week and progress to indep. lymph. management.  -CW           User Key  (r) = Recorded By, (t) = Taken By, (c) = Cosigned By    Initials Name Provider Type    Clara Barrios OTR Occupational Therapist                    Manual Rx (last 36 hours)     Manual Treatments     Row Name 02/13/23 0870             Total Minutes    55246 - OT Manual Therapy Minutes 57  -CW            User Key  (r) = Recorded By, (t) = Taken By, (c) = Cosigned By    Initials Name Provider Type    Clara Barrios OTR Occupational Therapist                  Therapy Education  Given: Edema management,  Symptoms/condition management, Bandaging/dressing change  Program: Reinforced  How Provided: Verbal, Demonstration  Provided to: Patient  Level of Understanding: Verbalized                Time Calculation:   OT Start Time: 0733  OT Stop Time: 0830  OT Time Calculation (min): 57 min  Total Timed Code Minutes- OT: 57 minute(s)  Timed Charges  27347 - OT Manual Therapy Minutes: 57  Total Minutes  Timed Charges Total Minutes: 57   Total Minutes: 57     Therapy Charges for Today     Code Description Service Date Service Provider Modifiers Qty    [id number]  OT MANUAL THERAPY EA 15 MIN 2/13/2023 Clara Cast OTR GO 4                      Clara Cast, OTR  2/13/2023

## 2023-02-14 ENCOUNTER — APPOINTMENT (OUTPATIENT)
Dept: OCCUPATIONAL THERAPY | Facility: HOSPITAL | Age: 53
End: 2023-02-14
Payer: COMMERCIAL

## 2023-02-15 ENCOUNTER — HOSPITAL ENCOUNTER (OUTPATIENT)
Dept: OCCUPATIONAL THERAPY | Facility: HOSPITAL | Age: 53
Setting detail: THERAPIES SERIES
Discharge: HOME | End: 2023-02-15
Payer: COMMERCIAL

## 2023-02-15 DIAGNOSIS — I89.0 LYMPHEDEMA OF BOTH LOWER EXTREMITIES: Primary | ICD-10-CM

## 2023-02-15 PROCEDURE — 97140 MANUAL THERAPY 1/> REGIONS: CPT

## 2023-02-15 NOTE — THERAPY PROGRESS REPORT/RE-CERT
Outpatient Occupational Therapy Lymphedema Progress Note  [facility]     Patient Name: Kiera Avalos  : 1970  MRN: [id number]  Today's Date: 2/15/2023      Visit Date: 02/15/2023    Patient Active Problem List   Diagnosis   • Gastroesophageal reflux disease   • History of bariatric surgery   • Iron deficiency anemia   • Seizure ([facility])   • Vitamin B12 deficiency   • Vitamin D deficiency   • Chronic constipation   • Pure hypercholesterolemia   • H/O gastric sleeve   • Encounter for screening colonoscopy   • Class 2 severe obesity with serious comorbidity and body mass index (BMI) of 35.0 to 35.9 in adult (HCC)        Past Medical History:   Diagnosis Date   • Anemia         Past Surgical History:   Procedure Laterality Date   • BARIATRIC SURGERY     •  SECTION     • CHOLECYSTECTOMY     • COLONOSCOPY N/A 2022    Procedure: COLONOSCOPY;  Surgeon: Troy Pittman MD;  [place] [facility] [place];  Service: Gastroenterology;  Laterality: N/A;  normal   • TONSILLECTOMY     • TUBAL ABDOMINAL LIGATION           Visit Dx:      ICD-10-CM ICD-9-CM   1. Lymphedema of both lower extremities  I89.0 457.1        Lymphedema     Row Name 02/15/23 0700             Subjective Pain    Able to rate subjective pain? yes  -CW      Pre-Treatment Pain Level 0  -CW      Post-Treatment Pain Level 0  -CW         Subjective Comments    Subjective Comments No LE pain at rest. Per pt. legs felt heavy yesterday.  -CW         Skin Changes/Observations    Skin Observations Comment normal color; small varicosities  -CW         Lymphedema Measurements    Measurement Type(s) Circumferential  -CW      Circumferential Areas Lower extremities  -CW         BLE Circumferential (cm)    Measurement Location 1 knee  -CW      Left 1 40.1 cm  -CW      Right 1 38 cm  -CW      Measurement Location 2 10 cm below knee  -CW      Left 2 41.9 cm  -CW      Right 2 41 cm  -CW      Measurement Location 3 20 cm below knee  -CW       Left 3 36 cm  -CW      Right 3 38 cm  -CW      Measurement Location 4 30 cm. below knee  -CW      Left 4 29.5 cm  -CW      Right 4 29.9 cm  -CW      Measurement Location 5 Ankle  -CW      Left 5 26.7 cm  -CW      Right 5 26.7 cm  -CW      Measurement Location 6 mid foot  -CW      Left 6 19.6 cm  -CW      Right 6 20 cm  -CW      LLE Circumferential Total 193.8 cm  -CW      RLE Circumferential Total 193.6 cm  -CW         Manual Lymphatic Drainage    Manual Lymphatic Drainage initial sequence;opened regional lymph nodes;opened anastamoses;extremity treatment  -CW      Initial Sequence short neck;abdomen  -CW      Abdomen superficial  -CW      Opened Regional Lymph Nodes axillary;inguinal  -CW      Axillary left;right  -CW      Inguinal left;right  -CW      Opened Anastamoses inguino-axillary  -CW      Extremity Treatment MLD to full limb  -CW      MLD to Full Limb BLE's  -CW         Compression/Skin Care    Compression/Skin Care skin care;wrapping location;bandaging;compression garment;remove bandages  -CW      Skin Care moisturizing lotion applied  -CW      Wrapping Location lower extremity  -CW      Wrapping Location LE bilateral:;foot to knee  -CW      Wrapping Comments Tg 9, 1- 10cm 1- 15 cm. Artiflex, 1- 8cm. 2- 10 cm Rosidal.  -CW      Bandage Layers cotton liner;padding/fluff layer;short-stretch bandages (comment size/quantity)  -CW      Bandaging Technique moderate compression  -CW            User Key  (r) = Recorded By, (t) = Taken By, (c) = Cosigned By    Initials Name Provider Type    CW Clara Cast OTR Occupational Therapist                         OT Assessment/Plan     Row Name 02/15/23 1349 02/15/23 09       OT Assessment    Assessment Comments No pain complaints at present, but reports her legs felt heavy yesterday. Pt. was able to wear the bandages day and night without discomfort. Skin dry and intact with no wounds or skin irritation. Measurements taken. See flow sheet. Total circumference RLE  "193.6 cm. and .8 cm. (12..7 cm.  R and 9.9 cm. L net decrease). Pt. is progressing with decreased edema  BLE’s compared to eval. Today > reduction LLE..  Goals updated and reviewed POC with pt. Reviewed bandage precautions and wearing schedule.  -CW --       OT Plan    OT Plan Comments -- Plan to cont. CDT 4x/week and progress to indep. lymph. management.  -CW          User Key  (r) = Recorded By, (t) = Taken By, (c) = Cosigned By    Initials Name Provider Type    Clara Barrios OTR Occupational Therapist                    Manual Rx (last 36 hours)     Manual Treatments     Row Name 02/15/23 1353             Total Minutes    35292 - OT Manual Therapy Minutes 56  -CW            User Key  (r) = Recorded By, (t) = Taken By, (c) = Cosigned By    Initials Name Provider Type    Clara Barrios OTR Occupational Therapist               OT Goals     Row Name 02/15/23 0900          OT Short Term Goals    STG Date to Achieve 02/17/23  -CW     STG 1 Patient independent and compliant with self-wrapping techniques of compression bandages with assistance of caregiver as needed for improved self-management of condition.  -CW     STG 1 Progress Progressing  -CW     STG 2 Patient will demonstrate proper awareness of “What is Lymphedema?” and \"Healthy Habits\" for improved prevention, management, care of symptoms and ease of transition to self-care of condition.  -CW     STG 2 Progress Progressing  -CW     STG 3 Patient will demonstrate decreased net edema of bilateral lower extremities >/= 3-7cm  for decrease in edema, symptoms, decreased risk of infection and improved skin care/transition to self-care of condition.  -CW     STG 3 Progress Met  -CW        Long Term Goals    LTG Date to Achieve 03/03/23  -CW     LTG 1 Patient independent and compliant with self-massage/soft tissue techniques with spouse/family member as needed for improved lymphatic drainage, decreased edema/symptoms, decreased risk of infection and " improved transition to self-care of condition.  -CW     LTG 1 Progress Ongoing  -CW     LTG 2 Patient will demonstrate decreased net edema of bilateral lower extremities >/= 8-15 cm  for decrease in edema, symptoms, decreased risk of infection and improved skin care/transition to self-care of condition.  -CW     LTG 2 Progress Ongoing;Progressing  -CW     LTG 3 Patient independent and compliant with initial home exercise program focused on diaphragmatic breathing, range of motion, flexibility to decrease edema and improve lymphatic flow for decreased edema and decreased risk of infection.  -CW     LTG 3 Progress Met  -CW     LTG 4 Patient independent and compliant with use and care of compression garments and/or Velcro products with assistance of a caregiver as needed to promote self-care independence.  -CW     LTG 4 Progress Ongoing  -CW     LTG 5 Patient to improve LLIS by 10 points in 4 weeks as an indicator of improved quality of life.  -CW     LTG 5 Progress Ongoing  -CW           User Key  (r) = Recorded By, (t) = Taken By, (c) = Cosigned By    Initials Name Provider Type    Clara Barrios OTR Occupational Therapist                Therapy Education  Given: Edema management, Symptoms/condition management, Bandaging/dressing change  Program: Reinforced, Progressed  How Provided: Verbal, Demonstration  Provided to: Patient  Level of Understanding: Verbalized                Time Calculation:   OT Start Time: 0734  OT Stop Time: 0830  OT Time Calculation (min): 56 min  Total Timed Code Minutes- OT: 56 minute(s)  Timed Charges  16774 - OT Manual Therapy Minutes: 56  Total Minutes  Timed Charges Total Minutes: 56   Total Minutes: 56     Therapy Charges for Today     Code Description Service Date Service Provider Modifiers Qty    07974683035  OT MANUAL THERAPY EA 15 MIN 2/15/2023 Clara Cast OTR GO 4                      ZORAIDA Jason  2/15/2023

## 2023-02-17 ENCOUNTER — APPOINTMENT (OUTPATIENT)
Dept: OCCUPATIONAL THERAPY | Facility: HOSPITAL | Age: 53
End: 2023-02-17
Payer: COMMERCIAL

## 2023-02-21 ENCOUNTER — HOSPITAL ENCOUNTER (OUTPATIENT)
Dept: OCCUPATIONAL THERAPY | Facility: HOSPITAL | Age: 53
Setting detail: THERAPIES SERIES
Discharge: HOME | End: 2023-02-21
Payer: COMMERCIAL

## 2023-02-21 DIAGNOSIS — I89.0 LYMPHEDEMA OF BOTH LOWER EXTREMITIES: Primary | ICD-10-CM

## 2023-02-21 PROCEDURE — 97140 MANUAL THERAPY 1/> REGIONS: CPT

## 2023-02-21 NOTE — THERAPY TREATMENT NOTE
Outpatient Occupational Therapy Lymphedema Treatment Note  [facility]     Patient Name: Kiera Avalos  : 1970  MRN: [id number]  Today's Date: 2023      Visit Date: 2023    Patient Active Problem List   Diagnosis   • Gastroesophageal reflux disease   • History of bariatric surgery   • Iron deficiency anemia   • Seizure ([facility])   • Vitamin B12 deficiency   • Vitamin D deficiency   • Chronic constipation   • Pure hypercholesterolemia   • H/O gastric sleeve   • Encounter for screening colonoscopy   • Class 2 severe obesity with serious comorbidity and body mass index (BMI) of 35.0 to 35.9 in adult (HCC)        Past Medical History:   Diagnosis Date   • Anemia         Past Surgical History:   Procedure Laterality Date   • BARIATRIC SURGERY     •  SECTION     • CHOLECYSTECTOMY     • COLONOSCOPY N/A 2022    Procedure: COLONOSCOPY;  Surgeon: Troy Pittman MD;  [place] [facility] [place];  Service: Gastroenterology;  Laterality: N/A;  normal   • TONSILLECTOMY     • TUBAL ABDOMINAL LIGATION           Visit Dx:      ICD-10-CM ICD-9-CM   1. Lymphedema of both lower extremities  I89.0 457.1        Lymphedema     Row Name 23 0700             Subjective Pain    Able to rate subjective pain? yes  -CW      Pre-Treatment Pain Level 0  -CW      Post-Treatment Pain Level 0  -CW         Subjective Comments    Subjective Comments No LE pain at present.  -CW         Skin Changes/Observations    Skin Observations Comment normal color; small varicosities  -CW         Manual Lymphatic Drainage    Manual Lymphatic Drainage initial sequence;opened regional lymph nodes;opened anastamoses;extremity treatment  -CW      Initial Sequence short neck;abdomen  -CW      Abdomen superficial  -CW      Opened Regional Lymph Nodes axillary;inguinal  -CW      Axillary left;right  -CW      Inguinal left;right  -CW      Opened Anastamoses inguino-axillary  -CW      Extremity Treatment MLD to full  limb  -CW      MLD to Full Limb BLE's  -CW         Compression/Skin Care    Compression/Skin Care skin care;wrapping location;bandaging;compression garment;remove bandages  -CW      Skin Care moisturizing lotion applied  -CW      Wrapping Location lower extremity  -CW      Wrapping Location LE bilateral:;foot to knee  -CW      Wrapping Comments Tg 9, 1- 10cm 1- 15 cm. Artiflex, 1- 8cm. 2- 10 cm Rosidal.  -CW      Bandage Layers cotton liner;padding/fluff layer;short-stretch bandages (comment size/quantity)  -CW      Bandaging Technique moderate compression  -CW            User Key  (r) = Recorded By, (t) = Taken By, (c) = Cosigned By    Initials Name Provider Type    Clara Barrios OTR Occupational Therapist                         OT Assessment/Plan     Row Name 02/21/23 0874          OT Assessment    Assessment Comments Pt. reports no LE pain at present. Was able to wear the compression bandages over the last few days. Skin intact with no rashes or irritation. Edema decreased BLE's ankles to mid calf per observation. Reviewed bandage precautions and wearing schedule. Pt. can removed top bandage layer off at night if experiences any pain or discomfort.  -CW        OT Plan    OT Plan Comments Plan to cont. CDT 4x/week and progress to indep. lymph. management.  -CW           User Key  (r) = Recorded By, (t) = Taken By, (c) = Cosigned By    Initials Name Provider Type    Clara Barrios OTR Occupational Therapist                    Manual Rx (last 36 hours)     Manual Treatments     Row Name 02/21/23 0835             Total Minutes    14519 - OT Manual Therapy Minutes 53  -CW            User Key  (r) = Recorded By, (t) = Taken By, (c) = Cosigned By    Initials Name Provider Type    Clara Barrios OTR Occupational Therapist                  Therapy Education  Given: Edema management, Symptoms/condition management, Bandaging/dressing change  Program: Reinforced  How Provided: Verbal, Demonstration  Provided to:  Patient  Level of Understanding: Verbalized                Time Calculation:   OT Start Time: 0742  OT Stop Time: 0835  OT Time Calculation (min): 53 min  Total Timed Code Minutes- OT: 53 minute(s)  Timed Charges  56480 - OT Manual Therapy Minutes: 53  Total Minutes  Timed Charges Total Minutes: 53   Total Minutes: 53     Therapy Charges for Today     Code Description Service Date Service Provider Modifiers Qty    74263028179 HC OT MANUAL THERAPY EA 15 MIN 2/21/2023 Clara Cast OTR GO 4                      ZORAIDA Jason  2/21/2023

## 2023-02-22 ENCOUNTER — HOSPITAL ENCOUNTER (OUTPATIENT)
Dept: OCCUPATIONAL THERAPY | Facility: HOSPITAL | Age: 53
Setting detail: THERAPIES SERIES
Discharge: HOME | End: 2023-02-22
Payer: COMMERCIAL

## 2023-02-22 DIAGNOSIS — I89.0 LYMPHEDEMA OF BOTH LOWER EXTREMITIES: Primary | ICD-10-CM

## 2023-02-22 PROCEDURE — 97140 MANUAL THERAPY 1/> REGIONS: CPT

## 2023-02-22 NOTE — THERAPY TREATMENT NOTE
Outpatient Occupational Therapy Lymphedema Treatment Note  [facility]     Patient Name: Kiera Avalos  : 1970  MRN: [id number]  Today's Date: 2023      Visit Date: 2023    Patient Active Problem List   Diagnosis   • Gastroesophageal reflux disease   • History of bariatric surgery   • Iron deficiency anemia   • Seizure ([facility])   • Vitamin B12 deficiency   • Vitamin D deficiency   • Chronic constipation   • Pure hypercholesterolemia   • H/O gastric sleeve   • Encounter for screening colonoscopy   • Class 2 severe obesity with serious comorbidity and body mass index (BMI) of 35.0 to 35.9 in adult (HCC)        Past Medical History:   Diagnosis Date   • Anemia         Past Surgical History:   Procedure Laterality Date   • BARIATRIC SURGERY     •  SECTION     • CHOLECYSTECTOMY     • COLONOSCOPY N/A 2022    Procedure: COLONOSCOPY;  Surgeon: Troy Pittman MD;  Location: [facility] [place];  Service: Gastroenterology;  Laterality: N/A;  normal   • TONSILLECTOMY     • TUBAL ABDOMINAL LIGATION           Visit Dx:      ICD-10-CM ICD-9-CM   1. Lymphedema of both lower extremities  I89.0 457.1        Lymphedema     Row Name 23 0700             Subjective Pain    Able to rate subjective pain? yes  -CW      Pre-Treatment Pain Level 0  -CW      Post-Treatment Pain Level 0  -CW         Subjective Comments    Subjective Comments No LE pain c/o at present.  -CW         Skin Changes/Observations    Skin Observations Comment normal color; small varicosities  -CW         Manual Lymphatic Drainage    Manual Lymphatic Drainage initial sequence;opened regional lymph nodes;opened anastamoses;extremity treatment  -CW      Initial Sequence short neck;abdomen  -CW      Abdomen superficial  -CW      Opened Regional Lymph Nodes axillary;inguinal  -CW      Axillary left;right  -CW      Inguinal left;right  -CW      Opened Anastamoses inguino-axillary  -CW      Extremity Treatment MLD to  full limb  -CW      MLD to Full Limb BLE's  -CW         Compression/Skin Care    Compression/Skin Care skin care;wrapping location;bandaging;compression garment;remove bandages  -CW      Skin Care moisturizing lotion applied  -CW      Wrapping Location lower extremity  -CW      Wrapping Location LE bilateral:;foot to knee  -CW      Wrapping Comments Tg 9, 1- 10cm 1- 15 cm. Artiflex, 1- 8cm. 2- 10 cm Rosidal.  -CW      Bandage Layers cotton liner;padding/fluff layer;short-stretch bandages (comment size/quantity)  -CW      Bandaging Technique moderate compression  -CW            User Key  (r) = Recorded By, (t) = Taken By, (c) = Cosigned By    Initials Name Provider Type    Clara Barrios OTR Occupational Therapist                         OT Assessment/Plan     Row Name 02/22/23 0851 02/22/23 0819       OT Assessment    Assessment Comments Pt. reports no LE pain at present. Pt. was able to wear the compression bandage yesterday and last night. Skin intact with no rashes or skin irritation. Pt. was measured for BLE custom class 2 stockings and BLE velcro Premium Circaids with [employer]. Discussed options for long term day/night compression and wearing schedule. Applied the bandages with mod compression as tolerated.  -CW --       OT Plan    OT Plan Comments Plan to cont. CDT 1 visit.  -CW Plan to cont. CDT 4x/week and progress to indep lymph. management.  -CW          User Key  (r) = Recorded By, (t) = Taken By, (c) = Cosigned By    Initials Name Provider Type    Clara Barrios OTR Occupational Therapist                    Manual Rx (last 36 hours)     Manual Treatments     Row Name 02/22/23 0855             Total Minutes    05116 - OT Manual Therapy Minutes 60  -CW            User Key  (r) = Recorded By, (t) = Taken By, (c) = Cosigned By    Initials Name Provider Type    Clara Barrios OTR Occupational Therapist                  Therapy Education  Given: Edema management, Symptoms/condition management,  Bandaging/dressing change  Program: Reinforced, Progressed, New  How Provided: Verbal, Demonstration  Provided to: Patient  Level of Understanding: Verbalized                Time Calculation:   OT Start Time: 0735  OT Stop Time: 0835  OT Time Calculation (min): 60 min  Timed Charges  15362 - OT Manual Therapy Minutes: 60  Total Minutes  Timed Charges Total Minutes: 60   Total Minutes: 60     Therapy Charges for Today     Code Description Service Date Service Provider Modifiers Qty    08049092332 HC OT MANUAL THERAPY EA 15 MIN 2/22/2023 Clara Csat OTR GO 4                      Clara Cast, OTMAXI  2/22/2023

## 2023-02-24 ENCOUNTER — APPOINTMENT (OUTPATIENT)
Dept: OCCUPATIONAL THERAPY | Facility: HOSPITAL | Age: 53
End: 2023-02-24
Payer: COMMERCIAL

## 2023-02-27 ENCOUNTER — HOSPITAL ENCOUNTER (OUTPATIENT)
Dept: OCCUPATIONAL THERAPY | Facility: HOSPITAL | Age: 53
Setting detail: THERAPIES SERIES
Discharge: HOME | End: 2023-02-27
Payer: COMMERCIAL

## 2023-02-27 DIAGNOSIS — I89.0 LYMPHEDEMA OF BOTH LOWER EXTREMITIES: Primary | ICD-10-CM

## 2023-02-27 PROCEDURE — 97140 MANUAL THERAPY 1/> REGIONS: CPT

## 2023-02-27 NOTE — THERAPY DISCHARGE NOTE
Outpatient Occupational Therapy Lymphedema Treatment Note/Discharge Summary  [facility]     Patient Name: Kiera Avalos  : 1970  MRN: [id number]  Today's Date: 2023      Visit Date: 2023    Patient Active Problem List   Diagnosis   • Gastroesophageal reflux disease   • History of bariatric surgery   • Iron deficiency anemia   • Seizure ([facility])   • Vitamin B12 deficiency   • Vitamin D deficiency   • Chronic constipation   • Pure hypercholesterolemia   • H/O gastric sleeve   • Encounter for screening colonoscopy   • Class 2 severe obesity with serious comorbidity and body mass index (BMI) of 35.0 to 35.9 in adult (HCC)        Past Medical History:   Diagnosis Date   • Anemia         Past Surgical History:   Procedure Laterality Date   • BARIATRIC SURGERY     •  SECTION     • CHOLECYSTECTOMY     • COLONOSCOPY N/A 2022    Procedure: COLONOSCOPY;  Surgeon: Troy Pittman MD;  Location: [facility] MAIN OR;  Service: Gastroenterology;  Laterality: N/A;  normal   • TONSILLECTOMY     • TUBAL ABDOMINAL LIGATION           Visit Dx:      ICD-10-CM ICD-9-CM   1. Lymphedema of both lower extremities  I89.0 457.1        Lymphedema     Row Name 23 0700             Subjective Pain    Able to rate subjective pain? yes  -CW      Pre-Treatment Pain Level 0  -CW      Post-Treatment Pain Level 0  -CW         Subjective Comments    Subjective Comments No LEL pain at present. Did have pain R 2nd toe over the weekend.  -CW         LLIS - Physical Concerns    The amount of pain associated with my lymphedema is: 0  -CW      The amount of limb heaviness associated with my lymphedema is: 1  -CW      The amount of skin tightness associated with my lymphedema is: 1  -CW      The size of my swollen limb(s) seems: 1  -CW      Lymphedema affects the movement of my swollen limb(s): 1  -CW      The strength in my swollen limb(s) is: 1  -CW         LLIS - Psychosocial Concerns    Lymphedema  "affects my body image (i.e., \"how I think I look\"). 2  -CW      Lymphedema affects my socializing with others. 1  -CW      Lymphedema affects my intimate relations with spouse or partner (rate 0 if not applicable 1  -CW      Lymphedema \"gets me down\" (i.e., depression, frustration, or anger) 1  -CW      I must rely on others for help due to my lymphedema. 1  -CW      I know what to do to manage my lymphedema 0  -CW         LLIS - Functional Concerns    Lymphedema affects my ability to perform self-care activities (i.e. eating, dressing, hygiene) 0  -CW      Lymphedema affects my ability to perform routine home or work-related activities. 1  -CW      Lymphedema affects my performance of preferred leisure activities. 1  -CW      Lymphedema affects proper fit of clothing/shoes 3  -CW      Lymphedema affects my sleep 0  -CW         Skin Changes/Observations    Skin Observations Comment normal color; small varicosities  -CW         Manual Lymphatic Drainage    Manual Lymphatic Drainage initial sequence;opened regional lymph nodes;opened anastamoses;extremity treatment  -CW      Initial Sequence --  -CW      Abdomen --  -CW      Opened Regional Lymph Nodes --  -CW      Axillary --  -CW      Inguinal --  -CW      Opened Anastamoses --  -CW      Extremity Treatment MLD to full limb  -CW      MLD to Full Limb BLE's. Instructed self MLD  -CW         Compression/Skin Care    Compression/Skin Care skin care;wrapping location;bandaging;compression garment;remove bandages  -CW      Skin Care moisturizing lotion applied  -CW      Wrapping Location lower extremity  -CW      Wrapping Location LE bilateral:;foot to knee  -CW      Wrapping Comments Tg 9, 1- 10cm 1- 15 cm. Artiflex, 1- 8cm. 2- 10 cm Rosidal.  -CW      Bandage Layers cotton liner;padding/fluff layer;short-stretch bandages (comment size/quantity)  -CW      Bandaging Technique moderate compression  -CW         Lymphedema Life Impact Scale Totals    A.  Total Q1 - Q17 (Do " "not include Q18) 16  -CW      B.  Total number of questions answered (Q1-Q17) 17  -CW      C. Divide A by B 0.94  -CW      D. Multiple C by 25 23.5  -CW            User Key  (r) = Recorded By, (t) = Taken By, (c) = Cosigned By    Initials Name Provider Type    Clara Barrios OTR Occupational Therapist                         OT Assessment/Plan     Row Name 02/27/23 0919 02/27/23 0744       OT Assessment    Assessment Comments Instructed D/c information and recommendations with pt. including wearing schedule for compression garments/inelastic velcro compression, skin care, HEP, self MLD (with assist as needed), precautions, infection information, 18 Steps for Prevention handout, care of garments, and overall lymphedema management. Instructed self MLD sequence and technique. Applied the bandages with mod compression. Stockings and velcro compression to be ordered.  -CW --       OT Plan    OT Plan Comments -- D/c  -CW          User Key  (r) = Recorded By, (t) = Taken By, (c) = Cosigned By    Initials Name Provider Type    Clara Barrios OTR Occupational Therapist                        Manual Rx (last 36 hours)     Manual Treatments     Row Name 02/27/23 0924             Total Minutes    63605 - OT Manual Therapy Minutes 60  -CW            User Key  (r) = Recorded By, (t) = Taken By, (c) = Cosigned By    Initials Name Provider Type    Clara Barrios OTR Occupational Therapist                 OT Goals     Row Name 02/27/23 0700          OT Short Term Goals    STG Date to Achieve 02/17/23  -CW     STG 1 Patient independent and compliant with self-wrapping techniques of compression bandages with assistance of caregiver as needed for improved self-management of condition.  -CW     STG 1 Progress Met  -CW     STG 2 Patient will demonstrate proper awareness of “What is Lymphedema?” and \"Healthy Habits\" for improved prevention, management, care of symptoms and ease of transition to self-care of condition.  -CW     " STG 2 Progress Met  -CW     STG 3 Patient will demonstrate decreased net edema of bilateral lower extremities >/= 3-7cm  for decrease in edema, symptoms, decreased risk of infection and improved skin care/transition to self-care of condition.  -CW     STG 3 Progress Met  -CW        Long Term Goals    LTG Date to Achieve 03/03/23  -CW     LTG 1 Patient independent and compliant with self-massage/soft tissue techniques with spouse/family member as needed for improved lymphatic drainage, decreased edema/symptoms, decreased risk of infection and improved transition to self-care of condition.  -CW     LTG 1 Progress Met  -CW     LTG 2 Patient will demonstrate decreased net edema of bilateral lower extremities >/= 8-15 cm  for decrease in edema, symptoms, decreased risk of infection and improved skin care/transition to self-care of condition.  -CW     LTG 2 Progress Met  -CW     LTG 3 Patient independent and compliant with initial home exercise program focused on diaphragmatic breathing, range of motion, flexibility to decrease edema and improve lymphatic flow for decreased edema and decreased risk of infection.  -CW     LTG 3 Progress Met  -CW     LTG 4 Patient independent and compliant with use and care of compression garments and/or Velcro products with assistance of a caregiver as needed to promote self-care independence.  -CW     LTG 4 Progress Met  -CW     LTG 5 Patient to improve LLIS by 10 points in 4 weeks as an indicator of improved quality of life.  -CW     LTG 5 Progress Met  -CW           User Key  (r) = Recorded By, (t) = Taken By, (c) = Cosigned By    Initials Name Provider Type    Clara Barrios OTR Occupational Therapist                Therapy Education  Given: Edema management, Symptoms/condition management, Bandaging/dressing change  Program: Reinforced, New  How Provided: Verbal, Demonstration, Written  Provided to: Patient  Level of Understanding: Verbalized                Time Calculation:   OT  Start Time: 0730  OT Stop Time: 0830  OT Time Calculation (min): 60 min  Timed Charges  76496 - OT Manual Therapy Minutes: 60  Total Minutes  Timed Charges Total Minutes: 60   Total Minutes: 60     Therapy Charges for Today     Code Description Service Date Service Provider Modifiers Yordy    62728943907 HC OT MANUAL THERAPY EA 15 MIN 2/27/2023 Clara Cast OTR GO 4                  OP OT Discharge Summary  Reason for Discharge: All goals achieved  Outcomes Achieved: Refer to plan of care for updates on goals achieved  Discharge Destination: Home with home program  Discharge Instructions: Order pending for BLE custom knee high class 2 compression stockings and velcro compression Juxtafit Premium circaid.  Recommend pt. to wear the compression garments during the day and bandages or inelastic velcro compression at night. Instructed HEP, skin care, self MLD, edema prevention, and overall lymphedema management with modifications as appropriate. Pt. will call if they have any questions.      ZORAIDA Jason  2/27/2023

## 2023-02-28 ENCOUNTER — APPOINTMENT (OUTPATIENT)
Dept: OCCUPATIONAL THERAPY | Facility: HOSPITAL | Age: 53
End: 2023-02-28
Payer: COMMERCIAL

## 2023-03-22 RX ORDER — SEMAGLUTIDE 1.34 MG/ML
INJECTION, SOLUTION SUBCUTANEOUS
Refills: 1 | OUTPATIENT
Start: 2023-03-22

## 2023-04-03 RX ORDER — SEMAGLUTIDE 1.34 MG/ML
INJECTION, SOLUTION SUBCUTANEOUS
Refills: 1 | OUTPATIENT
Start: 2023-04-03

## 2023-04-14 ENCOUNTER — OFFICE VISIT (OUTPATIENT)
Dept: INTERNAL MEDICINE | Facility: CLINIC | Age: 53
End: 2023-04-14
Payer: COMMERCIAL

## 2023-04-14 VITALS
WEIGHT: 173.5 LBS | DIASTOLIC BLOOD PRESSURE: 80 MMHG | HEART RATE: 64 BPM | HEIGHT: 61 IN | OXYGEN SATURATION: 99 % | BODY MASS INDEX: 32.76 KG/M2 | TEMPERATURE: 97.5 F | SYSTOLIC BLOOD PRESSURE: 118 MMHG

## 2023-04-14 DIAGNOSIS — E78.00 PURE HYPERCHOLESTEROLEMIA: ICD-10-CM

## 2023-04-14 DIAGNOSIS — K21.9 GASTROESOPHAGEAL REFLUX DISEASE, UNSPECIFIED WHETHER ESOPHAGITIS PRESENT: ICD-10-CM

## 2023-04-14 DIAGNOSIS — R53.82 CHRONIC FATIGUE: ICD-10-CM

## 2023-04-14 DIAGNOSIS — E66.01 CLASS 2 SEVERE OBESITY WITH SERIOUS COMORBIDITY AND BODY MASS INDEX (BMI) OF 35.0 TO 35.9 IN ADULT, UNSPECIFIED OBESITY TYPE: ICD-10-CM

## 2023-04-14 DIAGNOSIS — I89.0 LYMPHEDEMA: ICD-10-CM

## 2023-04-14 DIAGNOSIS — D17.1 LIPOMA OF BACK: ICD-10-CM

## 2023-04-14 DIAGNOSIS — E55.9 VITAMIN D DEFICIENCY: ICD-10-CM

## 2023-04-14 DIAGNOSIS — Z12.31 ENCOUNTER FOR SCREENING MAMMOGRAM FOR MALIGNANT NEOPLASM OF BREAST: ICD-10-CM

## 2023-04-14 DIAGNOSIS — Z98.84 HISTORY OF BARIATRIC SURGERY: ICD-10-CM

## 2023-04-14 DIAGNOSIS — E53.8 VITAMIN B12 DEFICIENCY: ICD-10-CM

## 2023-04-14 DIAGNOSIS — Z00.00 HEALTHCARE MAINTENANCE: Primary | ICD-10-CM

## 2023-04-14 DIAGNOSIS — D50.8 OTHER IRON DEFICIENCY ANEMIA: ICD-10-CM

## 2023-04-14 PROBLEM — Z12.11 ENCOUNTER FOR SCREENING COLONOSCOPY: Status: RESOLVED | Noted: 2022-06-06 | Resolved: 2023-04-14

## 2023-04-14 NOTE — PROGRESS NOTES
Subjective   Kiera Avalos is a 52 y.o. female.     Chief Complaint   Patient presents with   • Weight Loss        History of Present Illness   She is here today for CPE and follow-up on obesity and weight loss.  She is feeling well today.  She does note more frequent fatigue over the past few months with intermittent lightheadedness with position changes and return of vertigo.  She has a history of iron deficiency anemia and B12 deficiency. She is currently taking ferrous gluconate and a multivitamin.  She notes that when her iron is typically low she has an increase in fatigue and vertigo symptoms.  She notes intermittent constipation with daily iron supplement.  She is also noticed an increase in discomfort with a lipoma on her left upper back.  She denies any change in size, but feels intermittent discomfort.  She would like to discuss with the specialist possible removal.    Obesity- She is currently on semaglutide 1 mg weekly.  She has been out of the Ozempic for the past 3 weeks secondary to supply.  She has had 21 pound weight loss since starting the medication. Patient doing well with current medication regimen, compliant with medication schedule, denies adverse effects. She is currently exercising a few days a week.   Lymphedema-she has seen improvement in lymphedema with weight loss and PT. She is currently doing compression and home PT exercises.  She uses hydrochlorothiazide 25 mg daily.    GERD- well-controlled with Nexium 20 mg daily.  She denies any dysphagia or odynophagia.  Colon cancer screening- C-scope up-to-date.  She denies any change in bowel habits, change in stool caliber, BRBPR, melena, abdominal pain.    GYN-she thinks she may be entering menopause.  She is scheduled to see GYN back in September.  She would like to go ahead and order screening mammogram today.    The following portions of the patient's history were reviewed and updated as appropriate: allergies, current medications, past  family history, past medical history, past social history, past surgical history and problem list.    Review of Systems   Constitutional: Positive for fatigue. Negative for appetite change, chills, diaphoresis, fever, unexpected weight gain and unexpected weight loss.   HENT: Negative for congestion, dental problem, ear pain, hearing loss, mouth sores, nosebleeds, postnasal drip, rhinorrhea, sinus pressure, sneezing, sore throat, swollen glands, tinnitus and trouble swallowing.    Eyes: Negative for blurred vision, pain, discharge, redness, itching and visual disturbance.   Respiratory: Negative for cough, chest tightness, shortness of breath and wheezing.    Cardiovascular: Positive for leg swelling (improving). Negative for chest pain and palpitations.   Gastrointestinal: Positive for constipation. Negative for abdominal distention, abdominal pain, blood in stool, diarrhea, nausea, vomiting and GERD.   Endocrine: Negative for cold intolerance and heat intolerance.   Genitourinary: Negative for breast discharge, breast lump, breast pain, difficulty urinating, dyspareunia, dysuria, flank pain, frequency, hematuria, pelvic pain, pelvic pressure, urgency, urinary incontinence, vaginal bleeding and vaginal discharge.   Musculoskeletal: Negative for arthralgias, back pain, gait problem, joint swelling, myalgias and neck pain.   Skin: Negative for color change, rash and skin lesions.   Allergic/Immunologic: Negative for environmental allergies and food allergies.   Neurological: Positive for dizziness. Negative for tremors, seizures, syncope, speech difficulty, weakness, light-headedness, numbness, headache, memory problem and confusion.   Hematological: Negative for adenopathy. Does not bruise/bleed easily.   Psychiatric/Behavioral: Negative for sleep disturbance, suicidal ideas, depressed mood and stress. The patient is not nervous/anxious.        Objective   Physical Exam  Constitutional:       Appearance: Normal  appearance. She is well-developed.   HENT:      Head: Normocephalic and atraumatic.      Right Ear: Hearing, tympanic membrane, ear canal and external ear normal.      Left Ear: Hearing, tympanic membrane, ear canal and external ear normal.      Nose: Nose normal.      Right Sinus: No maxillary sinus tenderness or frontal sinus tenderness.      Left Sinus: No maxillary sinus tenderness or frontal sinus tenderness.      Mouth/Throat:      Lips: Pink.      Mouth: Mucous membranes are moist.      Dentition: Normal dentition.      Tongue: No lesions.      Pharynx: Oropharynx is clear. Uvula midline.      Tonsils: No tonsillar exudate.   Eyes:      General: Lids are normal.      Extraocular Movements: Extraocular movements intact.      Conjunctiva/sclera: Conjunctivae normal.      Pupils: Pupils are equal, round, and reactive to light.   Neck:      Thyroid: No thyroid mass, thyromegaly or thyroid tenderness.      Vascular: No carotid bruit.      Trachea: Trachea normal.   Cardiovascular:      Rate and Rhythm: Normal rate and regular rhythm.      Pulses: Normal pulses.           Radial pulses are 2+ on the right side and 2+ on the left side.        Popliteal pulses are 2+ on the right side and 2+ on the left side.        Dorsalis pedis pulses are 2+ on the right side and 2+ on the left side.        Posterior tibial pulses are 2+ on the right side and 2+ on the left side.      Heart sounds: S1 normal and S2 normal.   Pulmonary:      Effort: Pulmonary effort is normal.      Breath sounds: Normal breath sounds.   Abdominal:      General: Bowel sounds are normal. There is no distension or abdominal bruit.      Palpations: Abdomen is soft. There is no hepatomegaly or splenomegaly.      Tenderness: There is no abdominal tenderness.      Hernia: No hernia is present.   Musculoskeletal:      Cervical back: Normal range of motion and neck supple.      Right lower leg: No edema.      Left lower leg: No edema.   Lymphadenopathy:       Head:      Right side of head: No submental, submandibular, tonsillar or occipital adenopathy.      Left side of head: No submental, submandibular, tonsillar or occipital adenopathy.      Cervical: No cervical adenopathy.      Upper Body:      Right upper body: No supraclavicular adenopathy.      Left upper body: No supraclavicular adenopathy.      Lower Body: No right inguinal adenopathy. No left inguinal adenopathy.   Skin:     General: Skin is warm and dry.      Findings: No rash.      Nails: There is no clubbing.             Comments: Lymphedema present bilateral lower extremities.    6 cm round, fluctuant lipoma present left upper back along shoulder blade.   Neurological:      General: No focal deficit present.      Mental Status: She is alert and oriented to person, place, and time. Mental status is at baseline.      Cranial Nerves: Cranial nerves 2-12 are intact.      Sensory: Sensation is intact.      Motor: Motor function is intact.      Coordination: Coordination is intact.      Gait: Gait is intact.      Deep Tendon Reflexes:      Reflex Scores:       Patellar reflexes are 2+ on the right side and 2+ on the left side.  Psychiatric:         Attention and Perception: Attention normal.         Mood and Affect: Mood and affect normal.         Speech: Speech normal.         Behavior: Behavior normal.         Thought Content: Thought content normal.         Cognition and Memory: Cognition normal.         Vitals:    04/14/23 0757   BP: 118/80   Pulse: 64   Temp: 97.5 °F (36.4 °C)   SpO2: 99%      Body mass index is 32.8 kg/m².    Assessment & Plan   Diagnoses and all orders for this visit:    1. Healthcare maintenance (Primary)  -     CBC & Differential  -     Ferritin  -     Iron Profile  -     Hemoglobin A1c  -     Lipid Panel With LDL / HDL Ratio  -     TSH  -     T4, Free  -     Vitamin B12  -     Folate    2. Class 2 severe obesity with serious comorbidity and body mass index (BMI) of 35.0 to 35.9 in  adult, unspecified obesity type  -     CBC & Differential  -     Ferritin  -     Iron Profile  -     Hemoglobin A1c  -     Lipid Panel With LDL / HDL Ratio  -     TSH  -     T4, Free  -     Vitamin B12  -     Folate    3. Vitamin B12 deficiency  -     CBC & Differential  -     Vitamin B12  -     Folate    4. Vitamin D deficiency  -     Vitamin D,25-Hydroxy    5. Other iron deficiency anemia  -     CBC & Differential  -     Ferritin  -     Iron Profile    6. History of bariatric surgery  -     CBC & Differential  -     Ferritin  -     Iron Profile  -     Hemoglobin A1c  -     Lipid Panel With LDL / HDL Ratio  -     TSH  -     T4, Free  -     Vitamin B12  -     Folate    7. Pure hypercholesterolemia  -     Lipid Panel With LDL / HDL Ratio  -     TSH  -     T4, Free    8. Chronic fatigue  -     CBC & Differential  -     Ferritin  -     Iron Profile  -     TSH  -     T4, Free  -     Vitamin B12  -     Folate    9. Lymphedema    10. Gastroesophageal reflux disease, unspecified whether esophagitis present    11. Encounter for screening mammogram for malignant neoplasm of breast  -     Mammo Screening Digital Tomosynthesis Bilateral With CAD    12. Lipoma of back  -     Ambulatory Referral to General Surgery    Other orders  -     Semaglutide,0.25 or 0.5MG/DOS, (OZEMPIC) 2 MG/1.5ML solution pen-injector; Inject 0.5 mg under the skin into the appropriate area as directed 1 (One) Time Per Week.  Dispense: 1.5 mL; Refill: 0      BMI is >= 30 and <35. (Class 1 Obesity). The following options were offered after discussion;: exercise counseling/recommendations, nutrition counseling/recommendations and pharmacological intervention options    1.  Preventative counseling-encouraged her to continue healthy eating and regular exercise.  2.  Chronic fatigue-we will check labs today including CBC, iron profile, ferritin, B12, folate and vitamin D as she does have a history of deficiency.  She also has a history of bariatric surgery.   Discussed with her she is likely not completely absorbing her supplements.  Pending lab results if ferritin is very low on current oral treatment, we will place a referral to hematology for possible IV iron as she has done well with this in the past.  3.  HLD-check labs today.  Encouraged healthy eating and continuing regular exercise to promote weight loss.  4.  Obesity-improving with treatment.  Since she has been off the semaglutide 1 mg for the past 3 weeks we will restart at 0.5 mg weekly for the next 3 to 4 weeks.  If she is tolerating this we will increase back to 1 mg weekly.  Encouraged her to continue healthy eating, focusing on portion control and regular exercise.  5.  Lymphedema-improving with treatment and weight loss.  Continue compression and home physical therapy exercises along with weight loss.  6.  GERD-well-controlled.  Continue Nexium 20 mg daily.  Notify for dysphagia or odynophagia.  7.  Lipoma of back-referral placed to general surgery for further evaluation.  “Discussed risks/benefits to vaccination, reviewed components of the vaccine, discussed VIS, discussed informed consent, informed consent obtained. Patient/Parent was allowed to accept or refuse vaccine. Questions answered to satisfactory state of patient/Parent. We reviewed typical age appropriate and seasonally appropriate vaccinations. Reviewed immunization history and updated state vaccination form as needed. Patient was counseled on Tdap  Zoster    Dentist up-to-date  Eye exam up-to-date  GYN-scheduled in September  Mammogram ordered today  C-scope up-to-date  Encourage sunscreen use outside    Fasting labs today, will call with results.  Follow-up in 6 months with fasting labs prior.     Answers for HPI/ROS submitted by the patient on 4/12/2023  Please describe your symptoms.: Follow up per your request due to new med Ozempic for weight loss. Also, to address my suspicion of anemia. Blood work to see if iron is low. This has been  an ongoing issue for several years since weight loss surgery. If low,I usually have iron infusions, which work well for me. I’m very tired along with some other familiar symptoms of anemia.  Have you had these symptoms before?: Yes  How long have you been having these symptoms?: Greater than 2 weeks  Please list any medications you are currently taking for this condition.: OTC iron supplements  Please describe any probable cause for these symptoms. : Weight loss surgery  What is the primary reason for your visit?: Other

## 2023-04-15 LAB
25(OH)D3+25(OH)D2 SERPL-MCNC: 31.9 NG/ML (ref 30–100)
BASOPHILS # BLD AUTO: 0.04 10*3/MM3 (ref 0–0.2)
BASOPHILS NFR BLD AUTO: 0.6 % (ref 0–1.5)
CHOLEST SERPL-MCNC: 225 MG/DL (ref 0–200)
EOSINOPHIL # BLD AUTO: 0.06 10*3/MM3 (ref 0–0.4)
EOSINOPHIL NFR BLD AUTO: 0.9 % (ref 0.3–6.2)
ERYTHROCYTE [DISTWIDTH] IN BLOOD BY AUTOMATED COUNT: 13.4 % (ref 12.3–15.4)
FERRITIN SERPL-MCNC: 21.5 NG/ML (ref 13–150)
FOLATE SERPL-MCNC: 9.92 NG/ML (ref 4.78–24.2)
HBA1C MFR BLD: 5.2 % (ref 4.8–5.6)
HCT VFR BLD AUTO: 41.3 % (ref 34–46.6)
HDLC SERPL-MCNC: 61 MG/DL (ref 40–60)
HGB BLD-MCNC: 14 G/DL (ref 12–15.9)
IMM GRANULOCYTES # BLD AUTO: 0.01 10*3/MM3 (ref 0–0.05)
IMM GRANULOCYTES NFR BLD AUTO: 0.2 % (ref 0–0.5)
IRON SATN MFR SERPL: 17 % (ref 20–50)
IRON SERPL-MCNC: 75 MCG/DL (ref 37–145)
LDLC SERPL CALC-MCNC: 151 MG/DL (ref 0–100)
LDLC/HDLC SERPL: 2.45 {RATIO}
LYMPHOCYTES # BLD AUTO: 2.09 10*3/MM3 (ref 0.7–3.1)
LYMPHOCYTES NFR BLD AUTO: 32.3 % (ref 19.6–45.3)
MCH RBC QN AUTO: 31.3 PG (ref 26.6–33)
MCHC RBC AUTO-ENTMCNC: 33.9 G/DL (ref 31.5–35.7)
MCV RBC AUTO: 92.2 FL (ref 79–97)
MONOCYTES # BLD AUTO: 0.56 10*3/MM3 (ref 0.1–0.9)
MONOCYTES NFR BLD AUTO: 8.7 % (ref 5–12)
NEUTROPHILS # BLD AUTO: 3.71 10*3/MM3 (ref 1.7–7)
NEUTROPHILS NFR BLD AUTO: 57.3 % (ref 42.7–76)
NRBC BLD AUTO-RTO: 0 /100 WBC (ref 0–0.2)
PLATELET # BLD AUTO: 381 10*3/MM3 (ref 140–450)
RBC # BLD AUTO: 4.48 10*6/MM3 (ref 3.77–5.28)
T4 FREE SERPL-MCNC: 1.14 NG/DL (ref 0.93–1.7)
TIBC SERPL-MCNC: 441 MCG/DL
TRIGL SERPL-MCNC: 72 MG/DL (ref 0–150)
TSH SERPL DL<=0.005 MIU/L-ACNC: 2.41 UIU/ML (ref 0.27–4.2)
UIBC SERPL-MCNC: 366 MCG/DL (ref 112–346)
VIT B12 SERPL-MCNC: 917 PG/ML (ref 211–946)
VLDLC SERPL CALC-MCNC: 13 MG/DL (ref 5–40)
WBC # BLD AUTO: 6.47 10*3/MM3 (ref 3.4–10.8)

## 2023-04-17 DIAGNOSIS — D50.9 IRON DEFICIENCY ANEMIA, UNSPECIFIED IRON DEFICIENCY ANEMIA TYPE: Primary | ICD-10-CM

## 2023-04-17 DIAGNOSIS — Z78.9 TAKES IRON SUPPLEMENTS: ICD-10-CM

## 2023-05-04 ENCOUNTER — OFFICE VISIT (OUTPATIENT)
Dept: SURGERY | Facility: CLINIC | Age: 53
End: 2023-05-04
Payer: COMMERCIAL

## 2023-05-04 VITALS — HEIGHT: 61 IN | BODY MASS INDEX: 33.15 KG/M2 | WEIGHT: 175.6 LBS

## 2023-05-04 DIAGNOSIS — D17.1 LIPOMA OF BACK: Primary | ICD-10-CM

## 2023-05-04 PROCEDURE — 99203 OFFICE O/P NEW LOW 30 MIN: CPT | Performed by: SURGERY

## 2023-05-04 RX ORDER — CLINDAMYCIN PHOSPHATE 900 MG/50ML
900 INJECTION INTRAVENOUS ONCE
OUTPATIENT
Start: 2023-06-02 | End: 2023-05-04

## 2023-05-04 NOTE — PROGRESS NOTES
General Surgery  Initial Office Visit    CC: Back lipoma    HPI: The patient is a pleasant 52 y.o. year-old lady who presents today for evaluation of a left upper back subcutaneous lipoma which has been present in a constant duration for over 3 years.  It has gradually been enlarging in size and become moderately tender to palpation.  She had an ultrasound of the upper back in May of last year that demonstrated the lipoma measured 5.7 cm in diameter.  Since that time, she feels as though it has enlarged in size.  Other than palpation, nothing seems to make it any better or worse.  She has not noticed any overlying skin erythema, warmth, or drainage.     Past Medical History:   Hyperlipidemia  GERD  Iron deficiency anemia  Lymphedema    Past Surgical History:   Laparoscopic gastric sleeve  Colonoscopy (2022, Dr. Pittman)    Cholecystectomy  Tubal ligation  Tonsillectomy    Medications:   Nexium 20 mg daily  Ferrous gluconate 324 mg daily  Hydrochlorothiazide 25 mg daily  Theragran multivitamin daily  Ozempic 0.5 mg subcutaneous weekly    Allergies: Penicillin (hives), sulfa antibiotics (rash)    Family History: Mother with history of cervical cancer, father with history of lung cancer, brother with history of coronary artery disease, another brother with history of stroke, maternal aunt with history of breast cancer, paternal aunt with history of colon cancer    Social History: , non-smoker, no regular alcohol use    ROS:   Constitutional: Negative for fevers or chills  HENT: Negative for hearing loss or runny nose  Eyes: Negative for vision changes or scleral icterus  Respiratory: Negative for cough or shortness of breath  Cardiovascular: Negative for chest pain or heart palpitations  Gastrointestinal: Negative for abdominal distension, nausea, vomiting, constipation, melena, or hematochezia  Genitourinary: Negative for hematuria or dysuria  Musculoskeletal: Negative for joint swelling or gait  instability  Neurologic: Negative for tremors or seizures  Psychiatric: Negative for suicidal ideations or agitation  All other systems reviewed and negative    Physical Exam:  Height: 154 cm  Weight: 79 kg  BMI: 33  General: No acute distress, well-nourished & well-developed  HEAD: normocephalic, atraumatic  EYES: normal conjunctiva, sclera anicteric  EARS: grossly normal hearing  NECK: supple, no thyromegaly  CARDIOVASCULAR: regular rate and rhythm  RESPIRATORY: clear to auscultation bilaterally  GASTROINTESTINAL: soft, nontender, non-distended  MUSCULOSKELETAL: 6 cm subcutaneous lipoma of the left upper back mobile within the subcutaneous fat with no overlying skin erythema or fluctuance  PSYCHIATRIC: oriented x3, normal mood and affect    IMAGING:  ULTRASOUND LEFT UPPER BACK (May 2022):  IMPRESSION:  There is a 5.7 cm probable lipoma at the site of clinical concern in the left upper back region. Clinical follow-up is recommended. If desired, tissue sampling under sonographic guidance can be performed.    ASSESSMENT & PLAN  Mrs. 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  I reviewed her ultrasound from May of last year and would recommend proceeding with surgical excision of the enlarging lipoma in the operating room under local/MAC anesthesia.  She understands the risks of the procedure include bleeding, wound infection, and lipoma recurrence.  Despite these risks, she has consented to proceed.    Cecy Villasenor MD  General, Robotic, and Endoscopic Surgery  [facility]    [address], [address]  [place] [address]  P: [phone]  F: [phone]

## 2023-05-04 NOTE — H&P (VIEW-ONLY)
General Surgery  Initial Office Visit    CC: Back lipoma    HPI: The patient is a pleasant 52 y.o. year-old lady who presents today for evaluation of a left upper back subcutaneous lipoma which has been present in a constant duration for over 3 years.  It has gradually been enlarging in size and become moderately tender to palpation.  She had an ultrasound of the upper back in May of last year that demonstrated the lipoma measured 5.7 cm in diameter.  Since that time, she feels as though it has enlarged in size.  Other than palpation, nothing seems to make it any better or worse.  She has not noticed any overlying skin erythema, warmth, or drainage.     Past Medical History:   Hyperlipidemia  GERD  Iron deficiency anemia  Lymphedema    Past Surgical History:   Laparoscopic gastric sleeve  Colonoscopy (2022, Dr. Pittman)    Cholecystectomy  Tubal ligation  Tonsillectomy    Medications:   Nexium 20 mg daily  Ferrous gluconate 324 mg daily  Hydrochlorothiazide 25 mg daily  Theragran multivitamin daily  Ozempic 0.5 mg subcutaneous weekly    Allergies: Penicillin (hives), sulfa antibiotics (rash)    Family History: Mother with history of cervical cancer, father with history of lung cancer, brother with history of coronary artery disease, another brother with history of stroke, maternal aunt with history of breast cancer, paternal aunt with history of colon cancer    Social History: , non-smoker, no regular alcohol use    ROS:   Constitutional: Negative for fevers or chills  HENT: Negative for hearing loss or runny nose  Eyes: Negative for vision changes or scleral icterus  Respiratory: Negative for cough or shortness of breath  Cardiovascular: Negative for chest pain or heart palpitations  Gastrointestinal: Negative for abdominal distension, nausea, vomiting, constipation, melena, or hematochezia  Genitourinary: Negative for hematuria or dysuria  Musculoskeletal: Negative for joint swelling or gait  instability  Neurologic: Negative for tremors or seizures  Psychiatric: Negative for suicidal ideations or agitation  All other systems reviewed and negative    Physical Exam:  Height: 154 cm  Weight: 79 kg  BMI: 33  General: No acute distress, well-nourished & well-developed  HEAD: normocephalic, atraumatic  EYES: normal conjunctiva, sclera anicteric  EARS: grossly normal hearing  NECK: supple, no thyromegaly  CARDIOVASCULAR: regular rate and rhythm  RESPIRATORY: clear to auscultation bilaterally  GASTROINTESTINAL: soft, nontender, non-distended  MUSCULOSKELETAL: 6 cm subcutaneous lipoma of the left upper back mobile within the subcutaneous fat with no overlying skin erythema or fluctuance  PSYCHIATRIC: oriented x3, normal mood and affect    IMAGING:  ULTRASOUND LEFT UPPER BACK (May 2022):  IMPRESSION:  There is a 5.7 cm probable lipoma at the site of clinical concern in the left upper back region. Clinical follow-up is recommended. If desired, tissue sampling under sonographic guidance can be performed.    ASSESSMENT & PLAN  Mrs. 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  I reviewed her ultrasound from May of last year and would recommend proceeding with surgical excision of the enlarging lipoma in the operating room under local/MAC anesthesia.  She understands the risks of the procedure include bleeding, wound infection, and lipoma recurrence.  Despite these risks, she has consented to proceed.    Cecy Villasenor MD  General, Robotic, and Endoscopic Surgery  [facility]    [address], [address]  [place] [address]  P: [phone]  F: [phone]

## 2023-05-05 ENCOUNTER — HOSPITAL ENCOUNTER (OUTPATIENT)
Dept: MAMMOGRAPHY | Facility: HOSPITAL | Age: 53
Discharge: HOME | End: 2023-05-05
Admitting: NURSE PRACTITIONER
Payer: COMMERCIAL

## 2023-05-05 PROCEDURE — 77067 SCR MAMMO BI INCL CAD: CPT

## 2023-05-05 PROCEDURE — 77063 BREAST TOMOSYNTHESIS BI: CPT

## 2023-05-10 DIAGNOSIS — R92.8 ABNORMALITY OF LEFT BREAST ON SCREENING MAMMOGRAM: Primary | ICD-10-CM

## 2023-05-19 ENCOUNTER — PRE-ADMISSION TESTING (OUTPATIENT)
Dept: PREADMISSION TESTING | Facility: HOSPITAL | Age: 53
End: 2023-05-19
Payer: COMMERCIAL

## 2023-05-19 VITALS
DIASTOLIC BLOOD PRESSURE: 89 MMHG | HEART RATE: 85 BPM | BODY MASS INDEX: 33.06 KG/M2 | OXYGEN SATURATION: 98 % | SYSTOLIC BLOOD PRESSURE: 136 MMHG | RESPIRATION RATE: 16 BRPM | HEIGHT: 61 IN | TEMPERATURE: 98.3 F | WEIGHT: 175.1 LBS

## 2023-05-19 DIAGNOSIS — D17.1 LIPOMA OF BACK: ICD-10-CM

## 2023-05-19 LAB
ANION GAP SERPL CALCULATED.3IONS-SCNC: 7.7 MMOL/L (ref 5–15)
BUN SERPL-MCNC: 17 MG/DL (ref 6–20)
BUN/CREAT SERPL: 23.3 (ref 7–25)
CALCIUM SPEC-SCNC: 9.8 MG/DL (ref 8.6–10.5)
CHLORIDE SERPL-SCNC: 100 MMOL/L (ref 98–107)
CO2 SERPL-SCNC: 31.3 MMOL/L (ref 22–29)
CREAT SERPL-MCNC: 0.73 MG/DL (ref 0.57–1)
DEPRECATED RDW RBC AUTO: 39.9 FL (ref 37–54)
EGFRCR SERPLBLD CKD-EPI 2021: 97.9 ML/MIN/1.73
ERYTHROCYTE [DISTWIDTH] IN BLOOD BY AUTOMATED COUNT: 12.2 % (ref 12.3–15.4)
GLUCOSE SERPL-MCNC: 104 MG/DL (ref 65–99)
HCT VFR BLD AUTO: 42.4 % (ref 34–46.6)
HGB BLD-MCNC: 14.5 G/DL (ref 12–15.9)
MCH RBC QN AUTO: 31 PG (ref 26.6–33)
MCHC RBC AUTO-ENTMCNC: 34.2 G/DL (ref 31.5–35.7)
MCV RBC AUTO: 90.8 FL (ref 79–97)
PLATELET # BLD AUTO: 390 10*3/MM3 (ref 140–450)
PMV BLD AUTO: 9.6 FL (ref 6–12)
POTASSIUM SERPL-SCNC: 4.5 MMOL/L (ref 3.5–5.2)
RBC # BLD AUTO: 4.67 10*6/MM3 (ref 3.77–5.28)
SODIUM SERPL-SCNC: 139 MMOL/L (ref 136–145)
WBC NRBC COR # BLD: 6.03 10*3/MM3 (ref 3.4–10.8)

## 2023-05-19 PROCEDURE — 85027 COMPLETE CBC AUTOMATED: CPT

## 2023-05-19 PROCEDURE — 36415 COLL VENOUS BLD VENIPUNCTURE: CPT

## 2023-05-19 PROCEDURE — 80048 BASIC METABOLIC PNL TOTAL CA: CPT

## 2023-05-19 RX ORDER — CHLORHEXIDINE GLUCONATE 500 MG/1
CLOTH TOPICAL TAKE AS DIRECTED
COMMUNITY

## 2023-05-19 NOTE — DISCHARGE INSTRUCTIONS
Take the following medications the morning of surgery: NEXIUM    ARRIVE AT 1:30 PM      If you are on prescription narcotic pain medication to control your pain you may also take that medication the morning of surgery.    General Instructions:  Do not eat solid food after midnight the night before surgery.  You may drink clear liquids day of surgery but must stop at least one hour before your hospital arrival time. CUTOFF TIME IS 12:30 PM  It is beneficial for you to have a clear drink that contains carbohydrates the day of surgery.  We suggest a 12 to 20 ounce bottle of Gatorade or Powerade for non-diabetic patients or a 12 to 20 ounce bottle of G2 or Powerade Zero for diabetic patients. (Pediatric patients, are not advised to drink a 12 to 20 ounce carbohydrate drink)    Clear liquids are liquids you can see through.  Nothing red in color.     Plain water                               Sports drinks  Sodas                                   Gelatin (Jell-O)  Fruit juices without pulp such as white grape juice and apple juice  Popsicles that contain no fruit or yogurt  Tea or coffee (no cream or milk added)  Gatorade / Powerade  G2 / Powerade Zero    Patients who avoid smoking, chewing tobacco and alcohol for 4 weeks prior to surgery have a reduced risk of post-operative complications.  Quit smoking as many days before surgery as you can.  Do not smoke, use chewing tobacco or drink alcohol the day of surgery.   If applicable bring your C-PAP/ BI-PAP machine in with you to preop day of surgery.  Bring any papers given to you in the doctor’s office.  Wear clean comfortable clothes.  Do not wear contact lenses, false eyelashes or make-up.  Bring a case for your glasses.   Bring crutches or walker if applicable.  Remove all piercings.  Leave jewelry and any other valuables at home.  Hair extensions with metal clips must be removed prior to surgery.  The Pre-Admission Testing nurse will instruct you to bring medications  if unable to obtain an accurate list in Pre-Admission Testing.          Preventing a Surgical Site Infection:  For 2 to 3 days before surgery, avoid shaving with a razor because the razor can irritate skin and make it easier to develop an infection.    Any areas of open skin can increase the risk of a post-operative wound infection by allowing bacteria to enter and travel throughout the body.  Notify your surgeon if you have any skin wounds / rashes even if it is not near the expected surgical site.  The area will need assessed to determine if surgery should be delayed until it is healed.  The night prior to surgery shower using a fresh bar of anti-bacterial soap (such as Dial) and clean washcloth.  Sleep in a clean bed with clean clothing.  Do not allow pets to sleep with you.  Shower on the morning of surgery using a fresh bar of anti-bacterial soap (such as Dial) and clean washcloth.  Dry with a clean towel and dress in clean clothing.      CHLORHEXIDINE CLOTH INSTRUCTIONS  The morning of surgery follow these instructions using the Chlorhexidine cloths you've been given.  These steps reduce bacteria on the body.  Do not use the cloths near your eyes, ears mouth, genitalia or on open wounds.  Throw the cloths away after use but do not try to flush them down a toilet.      Open and remove one cloth at a time from the package.    Leave the cloth unfolded and begin the bathing.  Massage the skin with the cloths using gentle pressure to remove bacteria.  Do not scrub harshly.   Follow the steps below with one 2% CHG cloth per area (6 total cloths).  One cloth for neck, shoulders and chest.  One cloth for both arms, hands, fingers and underarms (do underarms last).  One cloth for the abdomen followed by groin.  One cloth for right leg and foot including between the toes.  One cloth for left leg and foot including between the toes.  The last cloth is to be used for the back of the neck, back and buttocks.    Allow the  CHG to air dry 3 minutes on the skin which will give it time to work and decrease the chance of irritation.  The skin may feel sticky until it is dry.  Do not rinse with water or any other liquid or you will lose the beneficial effects of the CHG.  If mild skin irritation occurs, do rinse the skin to remove the CHG.  Report this to the nurse at time of admission.  Do not apply lotions, creams, ointments, deodorants or perfumes after using the clothes. Dress in clean clothe before coming to the hospital.   Ask your surgeon if you will be receiving antibiotics prior to surgery.  Make sure you, your family, and all healthcare providers clean their hands with soap and water or an alcohol based hand  before caring for you or your wound.    Day of surgery:  Your arrival time is approximately two hours before your scheduled surgery time.  Upon arrival, a Pre-op nurse and Anesthesiologist will review your health history, obtain vital signs, and answer questions you may have.  The only belongings needed at this time will be a list of your home medications and if applicable your C-PAP/BI-PAP machine.  A Pre-op nurse will start an IV and you may receive medication in preparation for surgery, including something to help you relax.     Please be aware that surgery does come with discomfort.  We want to make every effort to control your discomfort so please discuss any uncontrolled symptoms with your nurse.   Your doctor will most likely have prescribed pain medications.      If you are going home after surgery you will receive individualized written care instructions before being discharged.  A responsible adult must drive you to and from the hospital on the day of your surgery and stay with you for 24 hours.  Discharge prescriptions can be filled by the hospital pharmacy during regular pharmacy hours.  If you are having surgery late in the day/evening your prescription may be e-prescribed to your pharmacy.  Please  verify your pharmacy hours or chose a 24 hour pharmacy to avoid not having access to your prescription because your pharmacy has closed for the day.    If you are staying overnight following surgery, you will be transported to your hospital room following the recovery period.  [facility] has all private rooms.    If you have any questions please call Pre-Admission Testing at [phone].  Deductibles and co-payments are collected on the day of service. Please be prepared to pay the required co-pay, deductible or deposit on the day of service as defined by your plan.    Call your surgeon immediately if you experience any of the following symptoms:  Sore Throat  Shortness of Breath or difficulty breathing  Cough  Chills  Body soreness or muscle pain  Headache  Fever  New loss of taste or smell  Do not arrive for your surgery ill.  Your procedure will need to be rescheduled to another time.  You will need to call your physician before the day of surgery to avoid any unnecessary exposure to hospital staff as well as other patients.

## 2023-05-24 ENCOUNTER — CONSULT (OUTPATIENT)
Dept: ONCOLOGY | Facility: CLINIC | Age: 53
End: 2023-05-24
Payer: COMMERCIAL

## 2023-05-24 ENCOUNTER — APPOINTMENT (OUTPATIENT)
Dept: OTHER | Facility: HOSPITAL | Age: 53
End: 2023-05-24
Payer: COMMERCIAL

## 2023-05-24 VITALS
BODY MASS INDEX: 31.65 KG/M2 | OXYGEN SATURATION: 95 % | RESPIRATION RATE: 16 BRPM | SYSTOLIC BLOOD PRESSURE: 109 MMHG | HEIGHT: 62 IN | HEART RATE: 76 BPM | TEMPERATURE: 98 F | WEIGHT: 172 LBS | DIASTOLIC BLOOD PRESSURE: 76 MMHG

## 2023-05-24 DIAGNOSIS — D51.0 VITAMIN B12 DEFICIENCY ANEMIA DUE TO INTRINSIC FACTOR DEFICIENCY: ICD-10-CM

## 2023-05-24 DIAGNOSIS — K90.9 MALABSORPTION OF IRON: ICD-10-CM

## 2023-05-24 DIAGNOSIS — D50.8 OTHER IRON DEFICIENCY ANEMIA: Primary | ICD-10-CM

## 2023-05-24 LAB
BASOPHILS # BLD AUTO: 0.07 10*3/MM3 (ref 0–0.2)
BASOPHILS NFR BLD AUTO: 1.1 % (ref 0–1.5)
DEPRECATED RDW RBC AUTO: 43.2 FL (ref 37–54)
EOSINOPHIL # BLD AUTO: 0.07 10*3/MM3 (ref 0–0.4)
EOSINOPHIL NFR BLD AUTO: 1.1 % (ref 0.3–6.2)
ERYTHROCYTE [DISTWIDTH] IN BLOOD BY AUTOMATED COUNT: 12.8 % (ref 12.3–15.4)
FERRITIN SERPL-MCNC: 18.2 NG/ML (ref 13–150)
HCT VFR BLD AUTO: 41 % (ref 34–46.6)
HGB BLD-MCNC: 13.7 G/DL (ref 12–15.9)
IMM GRANULOCYTES # BLD AUTO: 0.01 10*3/MM3 (ref 0–0.05)
IMM GRANULOCYTES NFR BLD AUTO: 0.2 % (ref 0–0.5)
IRON 24H UR-MRATE: 66 MCG/DL (ref 37–145)
IRON SATN MFR SERPL: 14 % (ref 20–50)
LYMPHOCYTES # BLD AUTO: 2.42 10*3/MM3 (ref 0.7–3.1)
LYMPHOCYTES NFR BLD AUTO: 37.1 % (ref 19.6–45.3)
MCH RBC QN AUTO: 30.8 PG (ref 26.6–33)
MCHC RBC AUTO-ENTMCNC: 33.4 G/DL (ref 31.5–35.7)
MCV RBC AUTO: 92.1 FL (ref 79–97)
MONOCYTES # BLD AUTO: 0.55 10*3/MM3 (ref 0.1–0.9)
MONOCYTES NFR BLD AUTO: 8.4 % (ref 5–12)
NEUTROPHILS NFR BLD AUTO: 3.4 10*3/MM3 (ref 1.7–7)
NEUTROPHILS NFR BLD AUTO: 52.1 % (ref 42.7–76)
NRBC BLD AUTO-RTO: 0 /100 WBC (ref 0–0.2)
PLATELET # BLD AUTO: 386 10*3/MM3 (ref 140–450)
PMV BLD AUTO: 10.1 FL (ref 6–12)
RBC # BLD AUTO: 4.45 10*6/MM3 (ref 3.77–5.28)
TIBC SERPL-MCNC: 480 MCG/DL (ref 298–536)
TRANSFERRIN SERPL-MCNC: 322 MG/DL (ref 200–360)
WBC NRBC COR # BLD: 6.52 10*3/MM3 (ref 3.4–10.8)

## 2023-05-24 PROCEDURE — 84466 ASSAY OF TRANSFERRIN: CPT | Performed by: INTERNAL MEDICINE

## 2023-05-24 PROCEDURE — 83540 ASSAY OF IRON: CPT | Performed by: INTERNAL MEDICINE

## 2023-05-24 PROCEDURE — 85025 COMPLETE CBC W/AUTO DIFF WBC: CPT | Performed by: INTERNAL MEDICINE

## 2023-05-24 PROCEDURE — 82728 ASSAY OF FERRITIN: CPT | Performed by: INTERNAL MEDICINE

## 2023-05-24 PROCEDURE — 36415 COLL VENOUS BLD VENIPUNCTURE: CPT | Performed by: INTERNAL MEDICINE

## 2023-05-24 NOTE — PROGRESS NOTES
Subjective     REASON FOR CONSULTATION:  Provide an opinion on any further workup or treatment on:    Iron deficiency anemia                        REQUESTING PHYSICIAN: Ginette Valle APRN    RECORDS OBTAINED: Records of the patients history including those obtained from the referring provider were reviewed and summarized in detail.    HISTORY OF PRESENT ILLNESS:    Kiera Avalos is a 52 y.o. patient who was referred for evaluation of iron deficiency anemia. She reports developing iron deficiency anemia about 10 years ago when she was in [place]. She was seen by a Hematologist. Due to her not responding to oral iron, she was given IV iron. She reports that she received the IV iron daily for a total of 10 doses. Her hemoglobin was very low and she was getting close to needing a transfusion.  She felt significantly better after the infusions. The IV iron infusions were repeated afterwards for a total of 7 or 8 doses. She moved to [place] and she received IV iron in 2020 at [facility]. She felt better but not to the degree of improvement she had earlier. She started taking oral iron daily hoping that it would delay the time that she will need IV iron.    The patient reports progressive fatigue. She also reports exertional shortness of breath. She reports that her nails are brittle and break easily. She also reports cramps in both legs.     Patient reports having regular periods lasting 3 days with normal amount of bleeding. Her menstrual cycles stopped in 2022.      REVIEW OF SYSTEMS:  Review of Systems   Constitutional: Positive for fatigue. Negative for fever and unexpected weight change.   HENT: Negative for nosebleeds and voice change.    Eyes: Negative for visual disturbance.   Respiratory: Positive for shortness of breath. Negative for cough.    Cardiovascular: Negative for chest pain and leg swelling.   Gastrointestinal: Negative for abdominal pain, blood in stool, constipation, diarrhea, nausea and vomiting.    Genitourinary: Negative for frequency and hematuria.   Musculoskeletal: Negative for back pain and joint swelling.   Skin: Negative for rash.   Neurological: Positive for dizziness and headaches.   Hematological: Negative for adenopathy. Does not bruise/bleed easily.   Psychiatric/Behavioral: Negative for dysphoric mood. The patient is not nervous/anxious.       Past Medical History:   Diagnosis Date   • Anemia     Iron deficiency-HISTORY OF IRON INFUSIONS-FOLLOWED BY HEMATOLOGY   • Epilepsy     LAST SEIZURE    • GERD (gastroesophageal reflux disease)    • Lymphedema      Past Surgical History:   Procedure Laterality Date   • BARIATRIC SURGERY     • BREAST BIOPSY     •  SECTION     • CHOLECYSTECTOMY     • COLONOSCOPY N/A 2022    Procedure: COLONOSCOPY;  Surgeon: Troy Pittman MD;  Location: [facility] MAIN OR;  Service: Gastroenterology;  Laterality: N/A;  normal   • FOOT SURGERY Right     lesion   • TONSILLECTOMY     • TUBAL ABDOMINAL LIGATION       Social History     Socioeconomic History   • Marital status:      Spouse name: Matteo   Tobacco Use   • Smoking status: Never   • Smokeless tobacco: Never   Vaping Use   • Vaping Use: Never used   Substance and Sexual Activity   • Alcohol use: Yes     Comment: Maybe once in about 2 to three months   • Drug use: Never   • Sexual activity: Yes     Partners: Male     Birth control/protection: Other, None, Tubal ligation     Comment: Tubes tied     Family History   Problem Relation Age of Onset   • Cancer Mother         Cervical   • Cervical cancer Mother    • Lung cancer Father    • Cancer Father         Lung   • Heart disease Brother         heart attack at 42   • Diabetes Brother         Type 2   • Stroke Brother    • No Known Problems Son    • Cancer Maternal Aunt         Breast Cancer   • Breast cancer Maternal Aunt    • Colon cancer Paternal Aunt    • Diabetes Maternal Grandmother    • No Known Problems Paternal  "Grandmother    • No Known Problems Paternal Grandfather    • Rheum arthritis Paternal Cousin    • Lupus Paternal Cousin    • Colon polyps Neg Hx    • Crohn's disease Neg Hx    • Irritable bowel syndrome Neg Hx    • Ulcerative colitis Neg Hx    • Malig Hyperthermia Neg Hx       MEDICATIONS:    Current Outpatient Medications:   •  esomeprazole (nexIUM) 20 MG capsule, Take 1 capsule by mouth Every Morning Before Breakfast., Disp: , Rfl:   •  ferrous gluconate (FERGON) 324 MG tablet, Take 1 tablet by mouth Daily With Breakfast., Disp: 90 tablet, Rfl: 1  •  hydroCHLOROthiazide (HYDRODIURIL) 25 MG tablet, Take 1 tablet by mouth Daily., Disp: , Rfl:   •  multivitamin (THERAGRAN) tablet tablet, Take  by mouth Daily. HOLD FOR SURGERY 7 DAYS, Disp: , Rfl:   •  Semaglutide,0.25 or 0.5MG/DOS, (OZEMPIC) 2 MG/1.5ML solution pen-injector, Inject 0.5 mg under the skin into the appropriate area as directed 1 (One) Time Per Week., Disp: 1.5 mL, Rfl: 0  •  Chlorhexidine Gluconate Cloth 2 % pads, Apply  topically Take As Directed. PRIOR TO SURGERY (Patient not taking: Reported on 5/24/2023), Disp: , Rfl:      ALLERGIES:  Allergies   Allergen Reactions   • Penicillins Hives   • Sulfa Antibiotics Rash        Objective   VITAL SIGNS:  Vitals:    05/24/23 1524   BP: 109/76   Pulse: 76   Resp: 16   Temp: 98 °F (36.7 °C)   TempSrc: Temporal   SpO2: 95%   Weight: 78 kg (172 lb)   Height: 157 cm (61.81\")  Comment: new ht   PainSc: 0-No pain     Wt Readings from Last 3 Encounters:   05/24/23 78 kg (172 lb)   05/19/23 79.4 kg (175 lb 1.6 oz)   05/04/23 79.7 kg (175 lb 9.6 oz)     PHYSICAL EXAMINATION  GENERAL:  The patient appears in good general condition, not in acute distress.  SKIN: No skin rashes. No ecchymosis.  HEAD:  Normocephalic.  EYES:  No Jaundice. No Pallor. Pupils equal. EOMI.  NECK:  Supple with Good ROM. No Masses.  LYMPHATICS:  No cervical lymphadenopathy.  CHEST: Normal respiratory effort.   CARDIAC:  No edema.  ABDOMEN:  Soft. " No tenderness. No Hepatomegaly. No Splenomegaly. No masses.  EXTREMITIES:  No clubbing. No noted deformity.  NEUROLOGICAL:  No Focal neurological deficits.     RESULT REVIEW:   Results from last 7 days   Lab Units 05/24/23  1513 05/19/23  0716   WBC 10*3/mm3 6.52 6.03   NEUTROS ABS 10*3/mm3 3.40  --    HEMOGLOBIN g/dL 13.7 14.5   HEMATOCRIT % 41.0 42.4   PLATELETS 10*3/mm3 386 390     Results from last 7 days   Lab Units 05/19/23  0716   SODIUM mmol/L 139   POTASSIUM mmol/L 4.5   CHLORIDE mmol/L 100   CO2 mmol/L 31.3*   BUN mg/dL 17   CREATININE mg/dL 0.73   CALCIUM mg/dL 9.8     Component      Latest Ref Rng 5/16/2022 4/14/2023 5/24/2023   Iron      37 - 145 mcg/dL 128  75  66    Iron Saturation      20 - 50 % 27  17 (L)  14 (L)    Transferrin      200 - 360 mg/dL 314   322    TIBC      298 - 536 mcg/dL 468  441  480    UIBC      112 - 346 mcg/dL  366 (H)     Ferritin      13.00 - 150.00 ng/mL 23.40  21.50  18.20       Lab Results   Component Value Date    FOLATE 9.92 04/14/2023    FOLATE 10.90 05/16/2022    FOLATE 4.98 08/09/2021     Lab Results   Component Value Date    ZQXCCYQK78 917 04/14/2023    ABXRPPTK19 721 05/16/2022    BJYKXJOF26 550 08/09/2021     Assessment & Plan   *Iron deficiency anemia.   · This is attributed to decreased absorption due to her prior bariatric surgery.   · She received IV iron in the past when she was in [place].  · She moved to [place] and she received IV iron in 2020.   · Her hemoglobin was 9.3 on 2/20/2020. Ferritin was 5.7.   · She felt better afterwards.   · However, she started having recurrence of the symptoms of iron deficiency anemia over the past several months.   · She is having exertional shortness of breath, fatigue, leg cramps. Dizziness and headaches.   · She has been taking Ferrous Sulfate 325 mg daily.  · Ferritin is 18.2 and transferrin saturation is 14%.  · The decline in the iron stores indicates that she is not absorbing the iron.   · Therefore, I recommended IV iron  therapy with Venofer 300 mg weekly x 3 doses. .    *VItamin B12 deficiency.  · This is attributed to decreased absorption due to the gastric sleeve surgery in 2011.   · She has been drinking coffee infused with vitamin B12.  · Vitamin B12 improved to 917 on 4/14/2023.    PLAN:    1.  We will give IV Venofer 300 mg weekly x 3 doses.   2.  Continue the current vitamin B12 replacement.   3.  Stop the ferrous sulfate. I suggested restarting it in about 2 months with the goal of possibly delay the time she will need the next series of IV iron. .  4.  Follow up in 6 months with CBC ferritin iron panel vitamin B12 and folate levels. I asked her to hold the iron for a week before before the visit.       Corrine Love MD  05/24/23

## 2023-05-30 RX ORDER — HYDROCHLOROTHIAZIDE 12.5 MG/1
TABLET ORAL
Qty: 90 TABLET | Refills: 1 | OUTPATIENT
Start: 2023-05-30

## 2023-05-31 ENCOUNTER — TELEPHONE (OUTPATIENT)
Dept: INTERNAL MEDICINE | Facility: CLINIC | Age: 53
End: 2023-05-31

## 2023-05-31 PROBLEM — D64.9 ABSOLUTE ANEMIA: Status: ACTIVE | Noted: 2023-05-31

## 2023-05-31 PROBLEM — K90.9 MALABSORPTION OF IRON: Status: ACTIVE | Noted: 2023-05-31

## 2023-05-31 NOTE — TELEPHONE ENCOUNTER
Caller: Kiera Avalos    Relationship: Self    Best call back number: [phone]*    What is the best time to reach you: ANYTIME    Who are you requesting to speak with (clinical staff, provider,  specific staff member): CLINICAL    What was the call regarding: PATIENT REQUESTING A CALL BACK TO DISCUSS A REFILL OF THE OZEMPIC. THE PATIENT STATES THAT SHE NEEDS TO GO UP IN HER DOSAGE, AND WILL MOST LIKELY REQUIRE A PRIOR AUTHORIZATION. THE PATIENT REQUEST A CALL BACK BEFORE THE MEDICATION ORDER IS SENT TO HER PHARMACY.    Is it okay if the provider responds through [facility]: NO. RESPOND VIA TELEPHONE

## 2023-06-02 ENCOUNTER — ANESTHESIA (OUTPATIENT)
Dept: PERIOP | Facility: HOSPITAL | Age: 53
End: 2023-06-02
Payer: COMMERCIAL

## 2023-06-02 ENCOUNTER — ANESTHESIA EVENT (OUTPATIENT)
Dept: PERIOP | Facility: HOSPITAL | Age: 53
End: 2023-06-02
Payer: COMMERCIAL

## 2023-06-02 ENCOUNTER — HOSPITAL ENCOUNTER (OUTPATIENT)
Facility: HOSPITAL | Age: 53
Setting detail: HOSPITAL OUTPATIENT SURGERY
Discharge: HOME | End: 2023-06-02
Attending: SURGERY | Admitting: SURGERY
Payer: COMMERCIAL

## 2023-06-02 VITALS
WEIGHT: 173.72 LBS | RESPIRATION RATE: 18 BRPM | SYSTOLIC BLOOD PRESSURE: 108 MMHG | OXYGEN SATURATION: 100 % | BODY MASS INDEX: 32.8 KG/M2 | HEIGHT: 61 IN | TEMPERATURE: 99.1 F | HEART RATE: 72 BPM | DIASTOLIC BLOOD PRESSURE: 64 MMHG

## 2023-06-02 DIAGNOSIS — D17.1 LIPOMA OF BACK: ICD-10-CM

## 2023-06-02 LAB
B-HCG UR QL: NEGATIVE
EXPIRATION DATE: NORMAL
INTERNAL NEGATIVE CONTROL: NEGATIVE
INTERNAL POSITIVE CONTROL: POSITIVE
Lab: NORMAL

## 2023-06-02 PROCEDURE — 25010000002 MIDAZOLAM PER 1 MG: Performed by: ANESTHESIOLOGY

## 2023-06-02 PROCEDURE — 25010000002 PROPOFOL 10 MG/ML EMULSION: Performed by: ANESTHESIOLOGY

## 2023-06-02 PROCEDURE — 21931 EXC BACK LES SC 3 CM/>: CPT | Performed by: SURGERY

## 2023-06-02 PROCEDURE — 21931 EXC BACK LES SC 3 CM/>: CPT

## 2023-06-02 PROCEDURE — 25010000002 KETOROLAC TROMETHAMINE PER 15 MG: Performed by: ANESTHESIOLOGY

## 2023-06-02 PROCEDURE — 88304 TISSUE EXAM BY PATHOLOGIST: CPT | Performed by: SURGERY

## 2023-06-02 PROCEDURE — 81025 URINE PREGNANCY TEST: CPT | Performed by: SURGERY

## 2023-06-02 PROCEDURE — 25010000002 FENTANYL CITRATE (PF) 50 MCG/ML SOLUTION: Performed by: ANESTHESIOLOGY

## 2023-06-02 RX ORDER — KETOROLAC TROMETHAMINE 30 MG/ML
INJECTION, SOLUTION INTRAMUSCULAR; INTRAVENOUS AS NEEDED
Status: DISCONTINUED | OUTPATIENT
Start: 2023-06-02 | End: 2023-06-02 | Stop reason: SURG

## 2023-06-02 RX ORDER — PHENYLEPHRINE HCL IN 0.9% NACL 1 MG/10 ML
SYRINGE (ML) INTRAVENOUS AS NEEDED
Status: DISCONTINUED | OUTPATIENT
Start: 2023-06-02 | End: 2023-06-02 | Stop reason: SURG

## 2023-06-02 RX ORDER — OXYCODONE AND ACETAMINOPHEN 7.5; 325 MG/1; MG/1
1 TABLET ORAL EVERY 4 HOURS PRN
Status: DISCONTINUED | OUTPATIENT
Start: 2023-06-02 | End: 2023-06-02 | Stop reason: HOSPADM

## 2023-06-02 RX ORDER — ONDANSETRON 2 MG/ML
4 INJECTION INTRAMUSCULAR; INTRAVENOUS ONCE AS NEEDED
Status: DISCONTINUED | OUTPATIENT
Start: 2023-06-02 | End: 2023-06-02 | Stop reason: HOSPADM

## 2023-06-02 RX ORDER — LABETALOL HYDROCHLORIDE 5 MG/ML
10 INJECTION, SOLUTION INTRAVENOUS
Status: DISCONTINUED | OUTPATIENT
Start: 2023-06-02 | End: 2023-06-02 | Stop reason: HOSPADM

## 2023-06-02 RX ORDER — HYDRALAZINE HYDROCHLORIDE 20 MG/ML
10 INJECTION INTRAMUSCULAR; INTRAVENOUS
Status: DISCONTINUED | OUTPATIENT
Start: 2023-06-02 | End: 2023-06-02 | Stop reason: HOSPADM

## 2023-06-02 RX ORDER — HYDROMORPHONE HYDROCHLORIDE 1 MG/ML
0.5 INJECTION, SOLUTION INTRAMUSCULAR; INTRAVENOUS; SUBCUTANEOUS
Status: DISCONTINUED | OUTPATIENT
Start: 2023-06-02 | End: 2023-06-02 | Stop reason: HOSPADM

## 2023-06-02 RX ORDER — PROPOFOL 10 MG/ML
VIAL (ML) INTRAVENOUS AS NEEDED
Status: DISCONTINUED | OUTPATIENT
Start: 2023-06-02 | End: 2023-06-02 | Stop reason: SURG

## 2023-06-02 RX ORDER — MIDAZOLAM HYDROCHLORIDE 1 MG/ML
1 INJECTION INTRAMUSCULAR; INTRAVENOUS
Status: COMPLETED | OUTPATIENT
Start: 2023-06-02 | End: 2023-06-02

## 2023-06-02 RX ORDER — SODIUM CHLORIDE, SODIUM LACTATE, POTASSIUM CHLORIDE, CALCIUM CHLORIDE 600; 310; 30; 20 MG/100ML; MG/100ML; MG/100ML; MG/100ML
9 INJECTION, SOLUTION INTRAVENOUS CONTINUOUS
Status: DISCONTINUED | OUTPATIENT
Start: 2023-06-02 | End: 2023-06-02 | Stop reason: HOSPADM

## 2023-06-02 RX ORDER — FENTANYL CITRATE 50 UG/ML
50 INJECTION, SOLUTION INTRAMUSCULAR; INTRAVENOUS ONCE AS NEEDED
Status: DISCONTINUED | OUTPATIENT
Start: 2023-06-02 | End: 2023-06-02 | Stop reason: HOSPADM

## 2023-06-02 RX ORDER — DROPERIDOL 2.5 MG/ML
0.62 INJECTION, SOLUTION INTRAMUSCULAR; INTRAVENOUS ONCE AS NEEDED
Status: DISCONTINUED | OUTPATIENT
Start: 2023-06-02 | End: 2023-06-02 | Stop reason: HOSPADM

## 2023-06-02 RX ORDER — DIPHENHYDRAMINE HYDROCHLORIDE 50 MG/ML
12.5 INJECTION INTRAMUSCULAR; INTRAVENOUS
Status: DISCONTINUED | OUTPATIENT
Start: 2023-06-02 | End: 2023-06-02 | Stop reason: HOSPADM

## 2023-06-02 RX ORDER — FLUMAZENIL 0.1 MG/ML
0.2 INJECTION INTRAVENOUS AS NEEDED
Status: DISCONTINUED | OUTPATIENT
Start: 2023-06-02 | End: 2023-06-02 | Stop reason: HOSPADM

## 2023-06-02 RX ORDER — BUPIVACAINE HYDROCHLORIDE AND EPINEPHRINE 5; 5 MG/ML; UG/ML
INJECTION, SOLUTION EPIDURAL; INTRACAUDAL; PERINEURAL AS NEEDED
Status: DISCONTINUED | OUTPATIENT
Start: 2023-06-02 | End: 2023-06-02 | Stop reason: HOSPADM

## 2023-06-02 RX ORDER — NALOXONE HCL 0.4 MG/ML
0.2 VIAL (ML) INJECTION AS NEEDED
Status: DISCONTINUED | OUTPATIENT
Start: 2023-06-02 | End: 2023-06-02 | Stop reason: HOSPADM

## 2023-06-02 RX ORDER — SODIUM CHLORIDE 0.9 % (FLUSH) 0.9 %
3 SYRINGE (ML) INJECTION EVERY 12 HOURS SCHEDULED
Status: DISCONTINUED | OUTPATIENT
Start: 2023-06-02 | End: 2023-06-02 | Stop reason: HOSPADM

## 2023-06-02 RX ORDER — CLINDAMYCIN PHOSPHATE 900 MG/50ML
900 INJECTION INTRAVENOUS ONCE
Status: COMPLETED | OUTPATIENT
Start: 2023-06-02 | End: 2023-06-02

## 2023-06-02 RX ORDER — HYDROCODONE BITARTRATE AND ACETAMINOPHEN 7.5; 325 MG/1; MG/1
1 TABLET ORAL ONCE AS NEEDED
Status: COMPLETED | OUTPATIENT
Start: 2023-06-02 | End: 2023-06-02

## 2023-06-02 RX ORDER — FENTANYL CITRATE 50 UG/ML
INJECTION, SOLUTION INTRAMUSCULAR; INTRAVENOUS AS NEEDED
Status: DISCONTINUED | OUTPATIENT
Start: 2023-06-02 | End: 2023-06-02 | Stop reason: SURG

## 2023-06-02 RX ORDER — HYDROMORPHONE HYDROCHLORIDE 1 MG/ML
0.25 INJECTION, SOLUTION INTRAMUSCULAR; INTRAVENOUS; SUBCUTANEOUS
Status: DISCONTINUED | OUTPATIENT
Start: 2023-06-02 | End: 2023-06-02 | Stop reason: HOSPADM

## 2023-06-02 RX ORDER — FAMOTIDINE 10 MG/ML
20 INJECTION, SOLUTION INTRAVENOUS ONCE
Status: COMPLETED | OUTPATIENT
Start: 2023-06-02 | End: 2023-06-02

## 2023-06-02 RX ORDER — SODIUM CHLORIDE 0.9 % (FLUSH) 0.9 %
3-10 SYRINGE (ML) INJECTION AS NEEDED
Status: DISCONTINUED | OUTPATIENT
Start: 2023-06-02 | End: 2023-06-02 | Stop reason: HOSPADM

## 2023-06-02 RX ORDER — LIDOCAINE HYDROCHLORIDE 20 MG/ML
INJECTION, SOLUTION INFILTRATION; PERINEURAL AS NEEDED
Status: DISCONTINUED | OUTPATIENT
Start: 2023-06-02 | End: 2023-06-02 | Stop reason: SURG

## 2023-06-02 RX ORDER — FENTANYL CITRATE 50 UG/ML
25 INJECTION, SOLUTION INTRAMUSCULAR; INTRAVENOUS
Status: DISCONTINUED | OUTPATIENT
Start: 2023-06-02 | End: 2023-06-02 | Stop reason: HOSPADM

## 2023-06-02 RX ORDER — LIDOCAINE HYDROCHLORIDE 10 MG/ML
0.5 INJECTION, SOLUTION EPIDURAL; INFILTRATION; INTRACAUDAL; PERINEURAL ONCE AS NEEDED
Status: DISCONTINUED | OUTPATIENT
Start: 2023-06-02 | End: 2023-06-02 | Stop reason: HOSPADM

## 2023-06-02 RX ORDER — EPHEDRINE SULFATE 50 MG/ML
10 INJECTION, SOLUTION INTRAVENOUS ONCE AS NEEDED
Status: DISCONTINUED | OUTPATIENT
Start: 2023-06-02 | End: 2023-06-02 | Stop reason: HOSPADM

## 2023-06-02 RX ADMIN — HYDROCODONE BITARTRATE AND ACETAMINOPHEN 1 TABLET: 7.5; 325 TABLET ORAL at 17:00

## 2023-06-02 RX ADMIN — MIDAZOLAM 1 MG: 1 INJECTION INTRAMUSCULAR; INTRAVENOUS at 13:50

## 2023-06-02 RX ADMIN — SODIUM CHLORIDE, POTASSIUM CHLORIDE, SODIUM LACTATE AND CALCIUM CHLORIDE 9 ML/HR: 600; 310; 30; 20 INJECTION, SOLUTION INTRAVENOUS at 13:31

## 2023-06-02 RX ADMIN — PROPOFOL 80 MG: 10 INJECTION, EMULSION INTRAVENOUS at 15:49

## 2023-06-02 RX ADMIN — LIDOCAINE HYDROCHLORIDE 60 MG: 20 INJECTION, SOLUTION INFILTRATION; PERINEURAL at 15:49

## 2023-06-02 RX ADMIN — CLINDAMYCIN IN 5 PERCENT DEXTROSE 900 MG: 18 INJECTION, SOLUTION INTRAVENOUS at 15:39

## 2023-06-02 RX ADMIN — FAMOTIDINE 20 MG: 10 INJECTION INTRAVENOUS at 13:43

## 2023-06-02 RX ADMIN — Medication 100 MCG: at 16:02

## 2023-06-02 RX ADMIN — PROPOFOL 120 MCG/KG/MIN: 10 INJECTION, EMULSION INTRAVENOUS at 15:50

## 2023-06-02 RX ADMIN — Medication 100 MCG: at 16:08

## 2023-06-02 RX ADMIN — Medication 200 MCG: at 15:53

## 2023-06-02 RX ADMIN — Medication 200 MCG: at 16:04

## 2023-06-02 RX ADMIN — MIDAZOLAM 1 MG: 1 INJECTION INTRAMUSCULAR; INTRAVENOUS at 13:43

## 2023-06-02 RX ADMIN — KETOROLAC TROMETHAMINE 30 MG: 30 INJECTION, SOLUTION INTRAMUSCULAR; INTRAVENOUS at 16:03

## 2023-06-02 RX ADMIN — FENTANYL CITRATE 50 MCG: 50 INJECTION, SOLUTION INTRAMUSCULAR; INTRAVENOUS at 15:49

## 2023-06-02 NOTE — ANESTHESIA PREPROCEDURE EVALUATION
Anesthesia Evaluation     no history of anesthetic complications:               Airway   Mallampati: II  TM distance: >3 FB  Neck ROM: full  Dental - normal exam     Pulmonary    (-) shortness of breath, recent URI, not a smoker  Cardiovascular     (+) hyperlipidemia,   (-) dysrhythmias, angina      Neuro/Psych  (+) seizures,    GI/Hepatic/Renal/Endo    (+)  GERD,    (-) liver disease, no renal disease    Musculoskeletal     Abdominal    Substance History      OB/GYN          Other   blood dyscrasia anemia,                     Anesthesia Plan    ASA 2     MAC     intravenous induction     Anesthetic plan, risks, benefits, and alternatives have been provided, discussed and informed consent has been obtained with: patient.        CODE STATUS:

## 2023-06-02 NOTE — ANESTHESIA POSTPROCEDURE EVALUATION
"Patient: Kiera Avalos    Procedure Summary     Date: 06/02/23 Room / Location: [facility] OR 02 / [facility] MAIN OR    Anesthesia Start: 1543 Anesthesia Stop: 1620    Procedure: Excision of left upper back lipoma (Left) Diagnosis:       Lipoma of back      (Lipoma of back [D17.1])    Surgeons: Cecy Villasenor MD Provider: Devyn Vargas MD    Anesthesia Type: MAC ASA Status: 2          Anesthesia Type: MAC    Vitals  Vitals Value Taken Time   /56 06/02/23 1630   Temp     Pulse 66 06/02/23 1632   Resp 18 06/02/23 1621   SpO2 100 % 06/02/23 1632   Vitals shown include unvalidated device data.        Post Anesthesia Care and Evaluation    Patient location during evaluation: bedside  Patient participation: complete - patient participated  Level of consciousness: sleepy but conscious  Pain management: adequate    Airway patency: patent  Anesthetic complications: No anesthetic complications    Cardiovascular status: acceptable  Respiratory status: acceptable  Hydration status: acceptable    Comments: */61 (BP Location: Right arm, Patient Position: Lying)   Pulse 67   Temp 37.3 °C (99.1 °F) (Oral)   Resp 18   Ht 154.9 cm (61\")   Wt 78.8 kg (173 lb 11.6 oz)   LMP 07/01/2022 (Within Weeks)   SpO2 100%   BMI 32.82 kg/m²         "

## 2023-06-02 NOTE — OP NOTE
Operative Note :  Cecy Villasenor MD      Kiera Robbie  1970    Procedure Date: 06/02/23    Pre-op Diagnosis:  Deep subcutaneous lipoma of back [D17.1]    Post-Operative Diagnosis:  Deep subcutaneous lipoma of back [D17.1]    Procedure:   Excision 6 cm deep subcutaneous lipoma of left upper back    Surgeon: Cecy Villasenor MD    Assistant: Cecy Logan PA-C (Cecy was responsible for suctioning, retracting, suturing of all surgical incisions, and application of sterile dressings at the completion of the case)    Anesthesia:  MAC (monitored anesthetic care)    Estimated Blood Loss: minimal    Specimens: Upper back lipoma    Complications: None    Indications:  The patient is a 52-year-old lady who came to see me recently with an enlarging painful mass of the left upper back.  I have recommended proceeding with lipoma excision today in the operating room under local/MAC anesthesia.  She understands the risks of the procedure and has consented to proceed.    Findings: 6 cm x 4 cm x 1.5 cm deep subcutaneous lipoma of upper back    Description of procedure:  The patient was brought to the operating room and placed on the OR table in the right lateral decubitus position.  Continuous propofol anesthesia was administered and the back prepped and draped in a sterile fashion.  A surgical timeout was completed.  The skin overlying the palpable mass was anesthetized using 0.5% Marcaine with epinephrine and incised sharply.  The underlying subcutaneous tissues were divided to a deep subcutaneous lipoma with the above-noted measurements.  It was easily eviscerated from the surrounding soft tissue attachments using electrocautery and passed off to pathology in formalin.  Hemostasis was achieved with electrocautery and the incision closed with interrupted 3-0 Vicryl deep dermal sutures followed by running 4-0 Vicryl subcuticular suture and topical Exofin glue.  She was then transferred to the recovery room in stable  condition with all counts correct per nursing.    Cecy Villasenor MD  General, Robotic, and Endoscopic Surgery  [facility]    [address], [address]  [place] [address]  P: [phone]  F: [phone]

## 2023-06-05 ENCOUNTER — INFUSION (OUTPATIENT)
Dept: ONCOLOGY | Facility: HOSPITAL | Age: 53
End: 2023-06-05
Payer: COMMERCIAL

## 2023-06-05 VITALS
DIASTOLIC BLOOD PRESSURE: 85 MMHG | RESPIRATION RATE: 15 BRPM | HEART RATE: 70 BPM | WEIGHT: 173.6 LBS | OXYGEN SATURATION: 100 % | TEMPERATURE: 97.1 F | BODY MASS INDEX: 32.77 KG/M2 | HEIGHT: 61 IN | SYSTOLIC BLOOD PRESSURE: 123 MMHG

## 2023-06-05 DIAGNOSIS — E66.01 CLASS 2 SEVERE OBESITY WITH SERIOUS COMORBIDITY AND BODY MASS INDEX (BMI) OF 35.0 TO 35.9 IN ADULT, UNSPECIFIED OBESITY TYPE: Primary | ICD-10-CM

## 2023-06-05 DIAGNOSIS — K90.9 MALABSORPTION OF IRON: ICD-10-CM

## 2023-06-05 DIAGNOSIS — D50.8 OTHER IRON DEFICIENCY ANEMIA: Primary | ICD-10-CM

## 2023-06-05 LAB
LAB AP CASE REPORT: NORMAL
PATH REPORT.FINAL DX SPEC: NORMAL
PATH REPORT.GROSS SPEC: NORMAL

## 2023-06-05 PROCEDURE — 96365 THER/PROPH/DIAG IV INF INIT: CPT

## 2023-06-05 PROCEDURE — 25010000002 IRON SUCROSE PER 1 MG: Performed by: INTERNAL MEDICINE

## 2023-06-05 PROCEDURE — 96366 THER/PROPH/DIAG IV INF ADDON: CPT

## 2023-06-05 PROCEDURE — 96375 TX/PRO/DX INJ NEW DRUG ADDON: CPT

## 2023-06-05 PROCEDURE — 63710000001 DIPHENHYDRAMINE PER 50 MG: Performed by: INTERNAL MEDICINE

## 2023-06-05 RX ORDER — DIPHENHYDRAMINE HCL 25 MG
25 CAPSULE ORAL ONCE
Status: COMPLETED | OUTPATIENT
Start: 2023-06-05 | End: 2023-06-05

## 2023-06-05 RX ORDER — HYDROCHLOROTHIAZIDE 12.5 MG/1
TABLET ORAL
Qty: 90 TABLET | Refills: 1 | OUTPATIENT
Start: 2023-06-05

## 2023-06-05 RX ORDER — FAMOTIDINE 10 MG/ML
20 INJECTION, SOLUTION INTRAVENOUS ONCE
Status: COMPLETED | OUTPATIENT
Start: 2023-06-05 | End: 2023-06-05

## 2023-06-05 RX ORDER — HYDROCHLOROTHIAZIDE 12.5 MG/1
12.5 TABLET ORAL DAILY
Qty: 90 TABLET | Refills: 1 | Status: SHIPPED | OUTPATIENT
Start: 2023-06-05

## 2023-06-05 RX ORDER — SODIUM CHLORIDE 9 MG/ML
250 INJECTION, SOLUTION INTRAVENOUS ONCE
Status: COMPLETED | OUTPATIENT
Start: 2023-06-05 | End: 2023-06-05

## 2023-06-05 RX ORDER — HYDROCHLOROTHIAZIDE 25 MG/1
25 TABLET ORAL DAILY
Status: CANCELLED
Start: 2023-06-05

## 2023-06-05 RX ADMIN — DIPHENHYDRAMINE HYDROCHLORIDE 25 MG: 25 CAPSULE ORAL at 11:10

## 2023-06-05 RX ADMIN — SODIUM CHLORIDE 250 ML: 9 INJECTION, SOLUTION INTRAVENOUS at 11:10

## 2023-06-05 RX ADMIN — FAMOTIDINE 20 MG: 10 INJECTION INTRAVENOUS at 11:11

## 2023-06-05 RX ADMIN — IRON SUCROSE 300 MG: 20 INJECTION, SOLUTION INTRAVENOUS at 11:39

## 2023-06-05 NOTE — TELEPHONE ENCOUNTER
PT STATES SHE HAS CAREN TAKING 12.5 MG OF HCTZ AND HER CHART SAID 25 MG ANS SHE THIS SHE IS STABLE ON 12.5. IS IT OK TO SEND 12.5 MG?

## 2023-06-07 DIAGNOSIS — E66.01 CLASS 2 SEVERE OBESITY WITH SERIOUS COMORBIDITY AND BODY MASS INDEX (BMI) OF 35.0 TO 35.9 IN ADULT, UNSPECIFIED OBESITY TYPE: Primary | ICD-10-CM

## 2023-06-07 RX ORDER — PHENTERMINE AND TOPIRAMATE 3.75; 23 MG/1; MG/1
1 CAPSULE, EXTENDED RELEASE ORAL EVERY MORNING
Qty: 30 CAPSULE | Refills: 0 | Status: SHIPPED | OUTPATIENT
Start: 2023-06-07 | End: 2023-06-08

## 2023-06-08 RX ORDER — PHENTERMINE AND TOPIRAMATE 3.75; 23 MG/1; MG/1
1 CAPSULE, EXTENDED RELEASE ORAL DAILY
Qty: 30 CAPSULE | Refills: 0 | Status: SHIPPED | OUTPATIENT
Start: 2023-06-08

## 2023-06-09 ENCOUNTER — HOSPITAL ENCOUNTER (OUTPATIENT)
Dept: MAMMOGRAPHY | Facility: HOSPITAL | Age: 53
Discharge: HOME | End: 2023-06-09
Payer: COMMERCIAL

## 2023-06-09 ENCOUNTER — HOSPITAL ENCOUNTER (OUTPATIENT)
Dept: ULTRASOUND IMAGING | Facility: HOSPITAL | Age: 53
Discharge: HOME | End: 2023-06-09
Payer: COMMERCIAL

## 2023-06-09 DIAGNOSIS — R92.8 ABNORMALITY OF LEFT BREAST ON SCREENING MAMMOGRAM: ICD-10-CM

## 2023-06-09 PROCEDURE — 77065 DX MAMMO INCL CAD UNI: CPT

## 2023-06-09 PROCEDURE — G0279 TOMOSYNTHESIS, MAMMO: HCPCS

## 2023-06-09 PROCEDURE — 76642 ULTRASOUND BREAST LIMITED: CPT

## 2023-06-12 ENCOUNTER — INFUSION (OUTPATIENT)
Dept: ONCOLOGY | Facility: HOSPITAL | Age: 53
End: 2023-06-12
Payer: COMMERCIAL

## 2023-06-12 VITALS
HEIGHT: 61 IN | TEMPERATURE: 97.8 F | WEIGHT: 173.8 LBS | DIASTOLIC BLOOD PRESSURE: 73 MMHG | HEART RATE: 74 BPM | BODY MASS INDEX: 32.81 KG/M2 | RESPIRATION RATE: 15 BRPM | SYSTOLIC BLOOD PRESSURE: 109 MMHG | OXYGEN SATURATION: 97 %

## 2023-06-12 DIAGNOSIS — K90.9 MALABSORPTION OF IRON: ICD-10-CM

## 2023-06-12 DIAGNOSIS — R92.8 ABNORMALITY OF LEFT BREAST ON SCREENING MAMMOGRAM: Primary | ICD-10-CM

## 2023-06-12 DIAGNOSIS — D50.8 OTHER IRON DEFICIENCY ANEMIA: Primary | ICD-10-CM

## 2023-06-12 PROCEDURE — 96375 TX/PRO/DX INJ NEW DRUG ADDON: CPT

## 2023-06-12 PROCEDURE — 63710000001 DIPHENHYDRAMINE PER 50 MG: Performed by: NURSE PRACTITIONER

## 2023-06-12 PROCEDURE — 25010000002 IRON SUCROSE PER 1 MG: Performed by: NURSE PRACTITIONER

## 2023-06-12 PROCEDURE — 96365 THER/PROPH/DIAG IV INF INIT: CPT

## 2023-06-12 PROCEDURE — 96366 THER/PROPH/DIAG IV INF ADDON: CPT

## 2023-06-12 RX ORDER — FAMOTIDINE 10 MG/ML
20 INJECTION, SOLUTION INTRAVENOUS ONCE
Status: COMPLETED | OUTPATIENT
Start: 2023-06-12 | End: 2023-06-12

## 2023-06-12 RX ORDER — SODIUM CHLORIDE 9 MG/ML
250 INJECTION, SOLUTION INTRAVENOUS ONCE
Status: COMPLETED | OUTPATIENT
Start: 2023-06-12 | End: 2023-06-12

## 2023-06-12 RX ORDER — DIPHENHYDRAMINE HCL 25 MG
25 CAPSULE ORAL ONCE
Status: COMPLETED | OUTPATIENT
Start: 2023-06-12 | End: 2023-06-12

## 2023-06-12 RX ADMIN — FAMOTIDINE 20 MG: 10 INJECTION INTRAVENOUS at 10:31

## 2023-06-12 RX ADMIN — IRON SUCROSE 300 MG: 20 INJECTION, SOLUTION INTRAVENOUS at 10:52

## 2023-06-12 RX ADMIN — DIPHENHYDRAMINE HYDROCHLORIDE 25 MG: 25 CAPSULE ORAL at 10:25

## 2023-06-12 RX ADMIN — SODIUM CHLORIDE 250 ML: 9 INJECTION, SOLUTION INTRAVENOUS at 10:30

## 2023-06-19 ENCOUNTER — INFUSION (OUTPATIENT)
Dept: ONCOLOGY | Facility: HOSPITAL | Age: 53
End: 2023-06-19
Payer: COMMERCIAL

## 2023-06-19 VITALS
HEART RATE: 56 BPM | DIASTOLIC BLOOD PRESSURE: 71 MMHG | WEIGHT: 175.4 LBS | TEMPERATURE: 97.1 F | RESPIRATION RATE: 16 BRPM | SYSTOLIC BLOOD PRESSURE: 114 MMHG | BODY MASS INDEX: 33.14 KG/M2 | OXYGEN SATURATION: 99 %

## 2023-06-19 DIAGNOSIS — D50.8 OTHER IRON DEFICIENCY ANEMIA: ICD-10-CM

## 2023-06-19 DIAGNOSIS — K90.9 MALABSORPTION OF IRON: Primary | ICD-10-CM

## 2023-06-19 PROCEDURE — 96366 THER/PROPH/DIAG IV INF ADDON: CPT

## 2023-06-19 PROCEDURE — 25010000002 IRON SUCROSE PER 1 MG: Performed by: NURSE PRACTITIONER

## 2023-06-19 PROCEDURE — 96375 TX/PRO/DX INJ NEW DRUG ADDON: CPT

## 2023-06-19 PROCEDURE — 63710000001 DIPHENHYDRAMINE PER 50 MG: Performed by: NURSE PRACTITIONER

## 2023-06-19 PROCEDURE — 96365 THER/PROPH/DIAG IV INF INIT: CPT

## 2023-06-19 RX ORDER — SODIUM CHLORIDE 9 MG/ML
250 INJECTION, SOLUTION INTRAVENOUS ONCE
Status: COMPLETED | OUTPATIENT
Start: 2023-06-19 | End: 2023-06-19

## 2023-06-19 RX ORDER — FAMOTIDINE 10 MG/ML
20 INJECTION, SOLUTION INTRAVENOUS ONCE
Status: COMPLETED | OUTPATIENT
Start: 2023-06-19 | End: 2023-06-19

## 2023-06-19 RX ORDER — DIPHENHYDRAMINE HCL 25 MG
25 CAPSULE ORAL ONCE
Status: COMPLETED | OUTPATIENT
Start: 2023-06-19 | End: 2023-06-19

## 2023-06-19 RX ADMIN — DIPHENHYDRAMINE HYDROCHLORIDE 25 MG: 25 CAPSULE ORAL at 09:34

## 2023-06-19 RX ADMIN — IRON SUCROSE 300 MG: 20 INJECTION, SOLUTION INTRAVENOUS at 10:03

## 2023-06-19 RX ADMIN — SODIUM CHLORIDE 250 ML: 9 INJECTION, SOLUTION INTRAVENOUS at 09:43

## 2023-06-19 RX ADMIN — FAMOTIDINE 20 MG: 10 INJECTION INTRAVENOUS at 09:43

## 2023-11-30 RX ORDER — HYDROCHLOROTHIAZIDE 12.5 MG/1
12.5 TABLET ORAL DAILY
Qty: 90 TABLET | Refills: 1 | Status: SHIPPED | OUTPATIENT
Start: 2023-11-30

## 2023-12-11 ENCOUNTER — HOSPITAL ENCOUNTER (OUTPATIENT)
Dept: ULTRASOUND IMAGING | Facility: HOSPITAL | Age: 53
Discharge: HOME | End: 2023-12-11
Payer: COMMERCIAL

## 2023-12-11 ENCOUNTER — HOSPITAL ENCOUNTER (OUTPATIENT)
Dept: MAMMOGRAPHY | Facility: HOSPITAL | Age: 53
Discharge: HOME | End: 2023-12-11
Payer: COMMERCIAL

## 2023-12-11 DIAGNOSIS — R92.8 ABNORMAL MAMMOGRAM OF LEFT BREAST: ICD-10-CM

## 2023-12-11 DIAGNOSIS — N63.20 MASS OF LEFT BREAST, UNSPECIFIED QUADRANT: Primary | ICD-10-CM

## 2023-12-11 DIAGNOSIS — R92.8 ABNORMALITY OF LEFT BREAST ON SCREENING MAMMOGRAM: ICD-10-CM

## 2023-12-11 PROCEDURE — 77065 DX MAMMO INCL CAD UNI: CPT

## 2023-12-11 PROCEDURE — 76642 ULTRASOUND BREAST LIMITED: CPT

## 2023-12-11 PROCEDURE — G0279 TOMOSYNTHESIS, MAMMO: HCPCS

## 2023-12-12 ENCOUNTER — TELEPHONE (OUTPATIENT)
Dept: SURGERY | Facility: CLINIC | Age: 53
End: 2023-12-12
Payer: COMMERCIAL

## 2023-12-12 ENCOUNTER — PREP FOR SURGERY (OUTPATIENT)
Dept: OTHER | Facility: HOSPITAL | Age: 53
End: 2023-12-12
Payer: COMMERCIAL

## 2023-12-12 DIAGNOSIS — R92.8 ABNORMAL MAMMOGRAM OF LEFT BREAST: Primary | ICD-10-CM

## 2023-12-13 ENCOUNTER — TELEPHONE (OUTPATIENT)
Dept: SURGERY | Facility: CLINIC | Age: 53
End: 2023-12-13
Payer: COMMERCIAL

## 2023-12-13 NOTE — TELEPHONE ENCOUNTER
BX scheduled at [facility] 1/5/2023 @ 8:30 am arrive 7:30 am     New patient appointment scheduled with Dr. Hunter   1/10/2023 @ 8:30 am Arrival 15 minutes prior to appointment time.   New patient packet mailed.    New patient packet mailed. She expressed v/u of appointment date and time.

## 2024-01-05 ENCOUNTER — HOSPITAL ENCOUNTER (OUTPATIENT)
Dept: MAMMOGRAPHY | Facility: HOSPITAL | Age: 54
Discharge: HOME | End: 2024-01-05
Payer: COMMERCIAL

## 2024-01-05 VITALS
TEMPERATURE: 98 F | HEART RATE: 65 BPM | SYSTOLIC BLOOD PRESSURE: 135 MMHG | BODY MASS INDEX: 33.99 KG/M2 | HEIGHT: 61 IN | DIASTOLIC BLOOD PRESSURE: 97 MMHG | WEIGHT: 180 LBS | OXYGEN SATURATION: 100 % | RESPIRATION RATE: 18 BRPM

## 2024-01-05 DIAGNOSIS — R92.8 ABNORMAL MAMMOGRAM OF LEFT BREAST: ICD-10-CM

## 2024-01-05 PROCEDURE — 25010000002 LIDOCAINE 1 % SOLUTION: Performed by: STUDENT IN AN ORGANIZED HEALTH CARE EDUCATION/TRAINING PROGRAM

## 2024-01-05 PROCEDURE — 88305 TISSUE EXAM BY PATHOLOGIST: CPT | Performed by: STUDENT IN AN ORGANIZED HEALTH CARE EDUCATION/TRAINING PROGRAM

## 2024-01-05 RX ORDER — LIDOCAINE HYDROCHLORIDE 10 MG/ML
1 INJECTION, SOLUTION INFILTRATION; PERINEURAL ONCE
Status: COMPLETED | OUTPATIENT
Start: 2024-01-05 | End: 2024-01-05

## 2024-01-05 RX ADMIN — LIDOCAINE HYDROCHLORIDE 30 ML: 10; .005 INJECTION, SOLUTION EPIDURAL; INFILTRATION; INTRACAUDAL; PERINEURAL at 08:37

## 2024-01-05 RX ADMIN — Medication 1 ML: at 08:36

## 2024-01-05 NOTE — H&P
Name: Kiera Avalos ADMIT: 2024   : 1970  PCP: Ginette Valle APRN    MRN: [id number] LOS: 0 days   AGE/SEX: 53 y.o. female  ROOM: Room/bed info not found       Chief complaint L breast focal asymm    Present Illness or Internal History:  Patient is a 53 y.o. female presents with L breast abnormality for stereo bx.     Past Surgical History:  Past Surgical History:   Procedure Laterality Date    BARIATRIC SURGERY      BREAST BIOPSY       SECTION      CHOLECYSTECTOMY      COLONOSCOPY N/A 2022    Procedure: COLONOSCOPY;  Surgeon: Troy Pittman MD;  [place] [facility] [place];  Service: Gastroenterology;  Laterality: N/A;  normal    EXCISION MASS TRUNK Left 2023    Procedure: Excision of left upper back lipoma;  Surgeon: Cecy Villasenor MD;  [place] [facility] [place];  Service: General;  Laterality: Left;    FOOT SURGERY Right     lesion    TONSILLECTOMY      TUBAL ABDOMINAL LIGATION         Past Medical History:  Past Medical History:   Diagnosis Date    Anemia     Iron deficiency-HISTORY OF IRON INFUSIONS-FOLLOWED BY HEMATOLOGY    Epilepsy     LAST SEIZURE     GERD (gastroesophageal reflux disease)     Lymphedema        Home Medications:  (Not in a hospital admission)      Allergies:  Penicillins and Sulfa antibiotics    Family History:  Family History   Problem Relation Age of Onset    Cancer Mother         Cervical    Cervical cancer Mother     Lung cancer Father     Cancer Father         Lung    Heart disease Brother         heart attack at 42    Diabetes Brother         Type 2    Stroke Brother     No Known Problems Son     Cancer Maternal Aunt         Breast Cancer    Breast cancer Maternal Aunt     Colon cancer Paternal Aunt     Diabetes Maternal Grandmother     No Known Problems Paternal Grandmother     No Known Problems Paternal Grandfather     Rheum arthritis Paternal Cousin     Lupus Paternal Cousin     Colon polyps Neg Hx     Crohn's  disease Neg Hx     Irritable bowel syndrome Neg Hx     Ulcerative colitis Neg Hx     Malig Hyperthermia Neg Hx        Social History:  Social History     Tobacco Use    Smoking status: Never    Smokeless tobacco: Never   Vaping Use    Vaping Use: Never used   Substance Use Topics    Alcohol use: Yes     Comment: Maybe once in about 2 to three months    Drug use: Never        Objective     Physical Exam:    No exam performed today,    Vital Signs  Temp:  [98 °F (36.7 °C)] 98 °F (36.7 °C)  Heart Rate:  [62] 62  Resp:  [17] 17  BP: (128)/(81) 128/81    Anticipated Surgical Procedure:  Left breast stereotactic core needle biopsy    The risks, benefits and alternatives of this procedure have been discussed with the patient or responsible party: Yes        Vanessa Pace MD  01/05/24  08:26 EST

## 2024-01-05 NOTE — NURSING NOTE
Biopsy site to left inner breast clear with Dermabond dry and intact. No firmness or swelling noted at or around biopsy site. 6/10 pain. Ice pack with protective covering applied to biopsy site. Recommend take Tylenol when get home. Discharge instructions discussed with understanding voiced by patient. Copies provided to patient. No distress noted. To home via personal vehicle.

## 2024-01-06 LAB
LAB AP CASE REPORT: NORMAL
PATH REPORT.FINAL DX SPEC: NORMAL
PATH REPORT.GROSS SPEC: NORMAL

## 2024-01-08 NOTE — PROGRESS NOTES
[facility]     Referring Provider: CANDIE Culver     Chief complaint: abnormal breast imaging     Subjective   HPI: Ms. Kiera Avalos is a 53 y.o. yo woman, seen at the request of CANDIE Culver, for a new diagnosis of BIRADs 4 mammogram.      This was initially detected as an imaging abnormality on routine screening. She denies any breast lumps, pain, skin changes, or nipple discharge.  She reports one prior breast biopsies, left, in 2014 while in [place].      She reports a pertinent PMH significant for chronic anemia follows with heme/onc, HTN, hx of sleeve gastrectomy.     She has a family history of breast cancer in her maternal aunt dx age 40s/  age late 50s, and maternal cousin dx age 40s/ alive age 55.  She has a family history of cervical cancer in her mother dx age 51/  from cervical cancer age 54    She was by herself in clinic today.       Review of Systems   Constitutional:  Negative for appetite change, fatigue and fever.   Respiratory:  Negative for cough and shortness of breath.    Gastrointestinal:  Negative for abdominal distention, diarrhea and nausea.   Musculoskeletal:  Negative for arthralgias and back pain.   Neurological:  Negative for dizziness, headaches and speech difficulty.   Psychiatric/Behavioral:  Negative for decreased concentration and sleep disturbance.        Medications:    Current Outpatient Medications:     esomeprazole (nexIUM) 20 MG capsule, Take 1 capsule by mouth Every Morning Before Breakfast., Disp: , Rfl:     ferrous gluconate (FERGON) 324 MG tablet, Take 1 tablet by mouth Daily With Breakfast., Disp: 90 tablet, Rfl: 1    hydroCHLOROthiazide (HYDRODIURIL) 12.5 MG tablet, TAKE 1 TABLET BY MOUTH EVERY DAY, Disp: 90 tablet, Rfl: 1    multivitamin (THERAGRAN) tablet tablet, Take  by mouth Daily. HOLD FOR SURGERY 7 DAYS, Disp: , Rfl:     Allergies:  Allergies   Allergen Reactions    Penicillins Hives    Sulfa Antibiotics Rash       Medical  history:  Past Medical History:   Diagnosis Date    Anemia     Iron deficiency-HISTORY OF IRON INFUSIONS-FOLLOWED BY HEMATOLOGY    Epilepsy     LAST SEIZURE     GERD (gastroesophageal reflux disease)     Lymphedema        Surgical History:  Past Surgical History:   Procedure Laterality Date    BARIATRIC SURGERY  2011    BREAST BIOPSY Left 2014     SECTION      CHOLECYSTECTOMY      COLONOSCOPY N/A 2022    Procedure: COLONOSCOPY;  Surgeon: Troy Pittman MD;  [place] [facility] [place];  Service: Gastroenterology;  Laterality: N/A;  normal    EXCISION MASS TRUNK Left 2023    Procedure: Excision of left upper back lipoma;  Surgeon: Cecy Villasenor MD;  Location: [facility] MAIN OR;  Service: General;  Laterality: Left;    FOOT SURGERY Right     lesion    TONSILLECTOMY      TUBAL ABDOMINAL LIGATION         Family History:  Family History   Problem Relation Age of Onset    Cancer Mother         Cervical    Cervical cancer Mother     Lung cancer Father     Cancer Father         Lung    Heart disease Brother         heart attack at 42    Diabetes Brother         Type 2    Stroke Brother     No Known Problems Son     Cancer Maternal Aunt         Breast Cancer    Breast cancer Maternal Aunt     Colon cancer Paternal Aunt     Diabetes Maternal Grandmother     No Known Problems Paternal Grandmother     No Known Problems Paternal Grandfather     Rheum arthritis Paternal Cousin     Lupus Paternal Cousin     Colon polyps Neg Hx     Crohn's disease Neg Hx     Irritable bowel syndrome Neg Hx     Ulcerative colitis Neg Hx     Malig Hyperthermia Neg Hx        Cancer Family History: Age of diagnosis/Age at death OR Age as of today  Breast Cancer: maternal aunt dx age 40s/  age late 50s, and maternal cousin dx age 51/ alive age 55.  Ovarian Cancer: N/A  Pancreatic Cancer: N/A  Prostate Cancer:N/A  Colon Cancer:paternal aunt dx age 60s/ age 90s  Lung Cancer:father dx age 60 pt father  " 13 days later from lung cancer  Cervical Cancer: her mother dx age 51/  from cervical cancer age 54    Social History:   Social History     Socioeconomic History    Marital status:      Spouse name: Matteo   Tobacco Use    Smoking status: Never     Passive exposure: Never    Smokeless tobacco: Never   Vaping Use    Vaping Use: Never used   Substance and Sexual Activity    Alcohol use: Yes     Comment: Maybe once in about 2 to three months    Drug use: Never    Sexual activity: Yes     Partners: Male     Birth control/protection: Other, None, Tubal ligation     Comment: Tubes tied       She lives with her    She is employed as a  at [employer]        GYNECOLOGIC HISTORY:   . P: 1. AB: 0.  Last menstrual period: a year ago / age 52  Age at menarche: 11  Age at first childbirth: 26   Lactation: 2 months   Age at menopause: 52  Total years of oral contraceptive use: 4 years   Total years of hormone replacement therapy: N/A      Objective   PHYSICAL EXAMINATION  This exam was chaperoned by: Helene  /82   Pulse 60   Ht 154.9 cm (60.98\")   Wt 81.6 kg (180 lb)   SpO2 100%   BMI 34.03 kg/m²   ECOG 0 - Asymptomatic  General: NAD, well appearing  Psych: a&o x 3, normal mood and affect  Eyes: EOMI, no scleral icterus  ENMT: neck supple without masses or thyromegaly, mucus membranes moist  Resp: normal effort  CV: RRR, no edema   GI: soft, NT, ND  MSK: normal gait, normal ROM in bilateral shoulders  Lymph nodes:  no cervical, supraclavicular or axillary lymphadenopathy  Breast: symmetric, ptotic  Right:  No visible abnormalities on inspection while seated, with arms raised or hands on hips. No masses, skin changes, or nipple abnormalities.  Left:  No visible abnormalities on inspection while seated, with arms raised or hands on hips. No masses, skin changes, or nipple abnormalities.         Imaging - documented images below have been personally " reviewed:  5/5/23 Bilateral Screening Mammo ([facility])  FINDINGS: Bilateral digital CC and MLO mammographic and digital  Tomosynthesis images were obtained. Comparison is made to prior studies  dated 02/04/2021 . Scattered fibroglandular densities are seen  throughout both breasts. In the posterior one third of the retroareolar  region of the left breast there is an area of focal asymmetry. I see no  areas of architectural distortion or skin thickening. There is no  evidence for axillary lymphadenopathy or nipple retraction.     IMPRESSION:  1. In the posterior one third of the retroareolar region of the left  breast there is an area of focal asymmetry. Further evaluation with spot  compression, CC mammographic and Tomosynthesis images and targeted left  breast sonography is recommended.  2. There are no findings suspicious for malignancy in the right breast.     BI-RADS Category 0: Incomplete. Needs additional imaging evaluation    6/9/23 LEFT Diagnostic Mammogram  Narrative & Impression   UNILATERAL LEFT DIGITAL DIAGNOSTIC MAMMOGRAPHY WITH TOMOSYNTHESIS AND R2  COMPUTER-AIDED DETECTION WITH LIMITED LEFT BREAST SONOGRAPHY     HISTORY: 52-year-old female undergoing call back evaluation of the left  breast.     FINDINGS: Unilateral left digital, 90 degree lateral and spot  compression CC mammographic and tomosynthesis images were obtained.  Comparison is made to prior mammography dated 05/05/2023 and 02/04/2021.  With additional imaging of the left breast there is partial persistence  of the area of focal asymmetry seen in the posterior one-third  retroareolar region of the left breast. The area becomes less focal  following spot compression imaging. However, the area does not resolve  and partially persists.     ULTRASOUND: Targeted sonographic evaluation of the left breast was  performed from the 11-o'clock through 1-o'clock positions and through  the retroareolar region. No sonographic abnormality is appreciated.      IMPRESSION:  There is partial resolution of an area of focal asymmetry  in the posterior one-third retroareolar region of the left breast. No  sonographic abnormality in the region is appreciated. The imaging  features suggest normal breast parenchyma. Six-month mammographic  follow-up and possible sonographic follow-up are recommended.     BI-RADS Category 3: Probably benign finding.         12/11/23 Left Diagnostic Mammogram and US  Projecting posterior/slightly medial to the nipple in the posterior left  breast, there is redemonstration of a partially effacing asymmetry  measuring approximately 0.8 cm. On the current examination, there is a  suspected correlate in the upper posterior left breast on the full  lateral view. Additionally, this area localizes to the upper breast on  tomosynthesis. This area was further assessed with ultrasound.  There are no new suspicious masses, calcifications, or areas of  architectural distortion.     ULTRASOUND:  Targeted sonographic evaluation of the left breast was performed from  10:00 to 1:00 in the region of the mammographic abnormality. No  suspicious cystic or solid mass is identified.        IMPRESSION:  Suspicious 0.8 cm focal asymmetry in the upper central to slightly inner  posterior left breast without a sonographic correlate. Recommend further  evaluation with stereotactic/tomosynthesis guided core needle biopsy.     Findings and recommendations were discussed with the patient in person  at the time of her examination. An epic in basket message was sent to  CANDIE Culver on 12/11/2023.     BI-RADS Category 4: Suspicious      1/5/23 Left Stereotactic Biopsy  BOWTIE TISSUE MARKER  IMPRESSION:   1. Stereotactic/Tomosynthesis guided core needle biopsy of a focal  asymmetry at 12:00 in the posterior left breast, marked with a bowtie  biopsy clip. Pathology is benign and concordant with the imaging  assessment. Recommend annual screening mammogram in May  2024.    Pathology:  1. Left Breast, 12:00, Stereotactic-Guided Core Needle Biopsy for a Focal Asymmetry:               A. Breast parenchyma with fibroadenomatoid change and focal pseudoangiomatous stromal       hyperplasia (PASH).    Assessment & Plan   Assessment:  53 y.o. F with a new diagnosis of PASH, fibroadenomatoid change, PASH    Discussion:  We reviewed her imaging, history, exam, pathology, and family history together today. Her most significant risk factors for breast cancer include the following: mat aunt and mat cousin (her uncle's daughter) with breast cancer  I calculated her lifetime risk of breast cancer using the Tyrer-Cuzick model as: 13.1  I calculated her 10-year risk of breast cancer using the Tyer Barrett model as: 4.9        With a lifetime risk of breast cancer greater than 20%, the current recommendations for screening include annual mammography and annual MRI, with clinical examination. We also discussed that for a Tyrer-Cuzick 10 year risk of >5% or LCIS, and a life expectancy > 10 years, that we recommend risk reduction counseling with the medical oncologists about chemopreventive agents such as tamoxifen, raloxifene, or exemestane.     Plan:    - She does not meet high risk screening at this time. While she would qualify for genetic testing/evaluation due to aunt with breast cancer diagnosed in her 40's she does no want to move forward with referral at this time.   - Self breast awareness including home self breast exams monthly to monitor for any new masses or changes to the breasts  - Mammograms starting at age 40 and continued annually unless there are changes to her personal or family history that would increase her breast cancer risk, next due May 2024  - RTC for clinical exam for one additional encounter following her screening mammogram to ensure stability/no additional issues.          Pura Hunter MD  Breast Surgical Oncology    I spent 45 minutes caring for Ms Avalos on this  date of service. This time includes time spent by me in the following activities: preparing for the visit, reviewing tests, obtaining and/or reviewing a separately obtained history, performing a medically appropriate examination and/or evaluation , counseling and educating the patient/family/caregiver, ordering medications, tests, or procedures, referring and communicating with other health care professionals , documenting information in the medical record, and independently interpreting results and communicating that information with the patient/family/caregiver.      CC:  CANDIE Culver Lauren, APRN Dr. Mann

## 2024-01-09 ENCOUNTER — TELEPHONE (OUTPATIENT)
Dept: INTERNAL MEDICINE | Facility: CLINIC | Age: 54
End: 2024-01-09

## 2024-01-09 NOTE — TELEPHONE ENCOUNTER
Called patient to let her know it has not been signed off by radiologist just yet, will give her a call as soon as we get results.

## 2024-01-09 NOTE — TELEPHONE ENCOUNTER
Caller: Kiera Avalos    Relationship to patient: Self    Best call back number: [phone]      Patient is needing: PATIENT STATES THAT SHE WOULD LIKE A CALLBACK TO REVIEW BIOPSY TEST RESULTS.    PLEASE ADVISE.

## 2024-01-10 ENCOUNTER — OFFICE VISIT (OUTPATIENT)
Dept: SURGERY | Facility: CLINIC | Age: 54
End: 2024-01-10
Payer: COMMERCIAL

## 2024-01-10 ENCOUNTER — TELEPHONE (OUTPATIENT)
Dept: SURGERY | Facility: CLINIC | Age: 54
End: 2024-01-10
Payer: COMMERCIAL

## 2024-01-10 VITALS
OXYGEN SATURATION: 100 % | HEIGHT: 61 IN | SYSTOLIC BLOOD PRESSURE: 138 MMHG | HEART RATE: 60 BPM | DIASTOLIC BLOOD PRESSURE: 82 MMHG | WEIGHT: 180 LBS | BODY MASS INDEX: 33.99 KG/M2

## 2024-01-10 DIAGNOSIS — Z12.31 BREAST CANCER SCREENING BY MAMMOGRAM: ICD-10-CM

## 2024-01-10 DIAGNOSIS — R92.8 ABNORMAL MAMMOGRAM: Primary | ICD-10-CM

## 2024-01-10 NOTE — TELEPHONE ENCOUNTER
Tried calling to let pt know that I scheduled her scr mammo at the [place] location on 05/08/2024 @ 11am and then I scheduled a follow up with Dr Hunter on 05/15 @11am but patient mailbox is full     Sent message via Local[employer]     Good Morning  I just wanted to let you know that I scheduled your screening mammogram and [facility] location on 05/08/2024 @ 11am and then I scheduled your follow up with Dr Hunter on 05/15/2024 @ 11am.    If you have any questions or need anything please let me know   Thanks   Kathy DORSEY

## 2024-01-10 NOTE — LETTER
January 10, 2024     Cecy Brannon DO  [address]  [place] [address]    Patient: Kiera Avalos   YOB: 1970   Date of Visit: 1/10/2024     Dear Cecy Brannon DO:       Thank you for referring Kiera Avalos to me for evaluation. Below are the relevant portions of my assessment and plan of care.    If you have questions, please do not hesitate to call me. I look forward to following Kiera along with you.         Sincerely,        Pura Hunter MD        CC: CANDIE Culver Sarah, MD  01/10/24 1352  Sign when Signing Visit  [facility]     Referring Provider: CANDIE Culver     Chief complaint: abnormal breast imaging     Subjective   HPI: Ms. Kiera Avalos is a 53 y.o. yo woman, seen at the request of CANDIE Culver, for a new diagnosis of BIRADs 4 mammogram.      This was initially detected as an imaging abnormality on routine screening. She denies any breast lumps, pain, skin changes, or nipple discharge.  She reports one prior breast biopsies, left, in  while in [place].      She reports a pertinent PMH significant for chronic anemia follows with heme/onc, HTN, hx of sleeve gastrectomy.     She has a family history of breast cancer in her maternal aunt dx age 40s/  age late 50s, and maternal cousin dx age 40s/ alive age 55.  She has a family history of cervical cancer in her mother dx age 51/  from cervical cancer age 54    She was by herself in clinic today.       Review of Systems   Constitutional:  Negative for appetite change, fatigue and fever.   Respiratory:  Negative for cough and shortness of breath.    Gastrointestinal:  Negative for abdominal distention, diarrhea and nausea.   Musculoskeletal:  Negative for arthralgias and back pain.   Neurological:  Negative for dizziness, headaches and speech difficulty.   Psychiatric/Behavioral:  Negative for decreased concentration and sleep disturbance.        Medications:    Current Outpatient  Medications:   •  esomeprazole (nexIUM) 20 MG capsule, Take 1 capsule by mouth Every Morning Before Breakfast., Disp: , Rfl:   •  ferrous gluconate (FERGON) 324 MG tablet, Take 1 tablet by mouth Daily With Breakfast., Disp: 90 tablet, Rfl: 1  •  hydroCHLOROthiazide (HYDRODIURIL) 12.5 MG tablet, TAKE 1 TABLET BY MOUTH EVERY DAY, Disp: 90 tablet, Rfl: 1  •  multivitamin (THERAGRAN) tablet tablet, Take  by mouth Daily. HOLD FOR SURGERY 7 DAYS, Disp: , Rfl:     Allergies:  Allergies   Allergen Reactions   • Penicillins Hives   • Sulfa Antibiotics Rash       Medical history:  Past Medical History:   Diagnosis Date   • Anemia     Iron deficiency-HISTORY OF IRON INFUSIONS-FOLLOWED BY HEMATOLOGY   • Epilepsy     LAST SEIZURE    • GERD (gastroesophageal reflux disease)    • Lymphedema        Surgical History:  Past Surgical History:   Procedure Laterality Date   • BARIATRIC SURGERY     • BREAST BIOPSY Left    •  SECTION     • CHOLECYSTECTOMY     • COLONOSCOPY N/A 2022    Procedure: COLONOSCOPY;  Surgeon: Troy Pittman MD;  Location: [facility] MAIN OR;  Service: Gastroenterology;  Laterality: N/A;  normal   • EXCISION MASS TRUNK Left 2023    Procedure: Excision of left upper back lipoma;  Surgeon: Cecy Villasenor MD;  Location: [facility] MAIN OR;  Service: General;  Laterality: Left;   • FOOT SURGERY Right     lesion   • TONSILLECTOMY     • TUBAL ABDOMINAL LIGATION         Family History:  Family History   Problem Relation Age of Onset   • Cancer Mother         Cervical   • Cervical cancer Mother    • Lung cancer Father    • Cancer Father         Lung   • Heart disease Brother         heart attack at 42   • Diabetes Brother         Type 2   • Stroke Brother    • No Known Problems Son    • Cancer Maternal Aunt         Breast Cancer   • Breast cancer Maternal Aunt    • Colon cancer Paternal Aunt    • Diabetes Maternal Grandmother    • No Known Problems Paternal Grandmother   "  • No Known Problems Paternal Grandfather    • Rheum arthritis Paternal Cousin    • Lupus Paternal Cousin    • Colon polyps Neg Hx    • Crohn's disease Neg Hx    • Irritable bowel syndrome Neg Hx    • Ulcerative colitis Neg Hx    • Malig Hyperthermia Neg Hx        Cancer Family History: Age of diagnosis/Age at death OR Age as of today  Breast Cancer: maternal aunt dx age 40s/  age late 50s, and maternal cousin dx age 51/ alive age 55.  Ovarian Cancer: N/A  Pancreatic Cancer: N/A  Prostate Cancer:N/A  Colon Cancer:paternal aunt dx age 60s/ age 90s  Lung Cancer:father dx age 60 pt father  13 days later from lung cancer  Cervical Cancer: her mother dx age 51/  from cervical cancer age 54    Social History:   Social History     Socioeconomic History   • Marital status:      Spouse name: Matteo   Tobacco Use   • Smoking status: Never     Passive exposure: Never   • Smokeless tobacco: Never   Vaping Use   • Vaping Use: Never used   Substance and Sexual Activity   • Alcohol use: Yes     Comment: Maybe once in about 2 to three months   • Drug use: Never   • Sexual activity: Yes     Partners: Male     Birth control/protection: Other, None, Tubal ligation     Comment: Tubes tied       She lives with her    She is employed as a  at [employer]        GYNECOLOGIC HISTORY:   . P: 1. AB: 0.  Last menstrual period: a year ago / age 52  Age at menarche: 11  Age at first childbirth: 26   Lactation: 2 months   Age at menopause: 52  Total years of oral contraceptive use: 4 years   Total years of hormone replacement therapy: N/A      Objective   PHYSICAL EXAMINATION  This exam was chaperoned by: Helene  /82   Pulse 60   Ht 154.9 cm (60.98\")   Wt 81.6 kg (180 lb)   SpO2 100%   BMI 34.03 kg/m²   ECOG 0 - Asymptomatic  General: NAD, well appearing  Psych: a&o x 3, normal mood and affect  Eyes: EOMI, no scleral icterus  ENMT: neck supple without masses or " thyromegaly, mucus membranes moist  Resp: normal effort  CV: RRR, no edema   GI: soft, NT, ND  MSK: normal gait, normal ROM in bilateral shoulders  Lymph nodes:  no cervical, supraclavicular or axillary lymphadenopathy  Breast: symmetric, ptotic  Right:  No visible abnormalities on inspection while seated, with arms raised or hands on hips. No masses, skin changes, or nipple abnormalities.  Left:  No visible abnormalities on inspection while seated, with arms raised or hands on hips. No masses, skin changes, or nipple abnormalities.         Imaging - documented images below have been personally reviewed:  5/5/23 Bilateral Screening Mammo ([facility])  FINDINGS: Bilateral digital CC and MLO mammographic and digital  Tomosynthesis images were obtained. Comparison is made to prior studies  dated 02/04/2021 . Scattered fibroglandular densities are seen  throughout both breasts. In the posterior one third of the retroareolar  region of the left breast there is an area of focal asymmetry. I see no  areas of architectural distortion or skin thickening. There is no  evidence for axillary lymphadenopathy or nipple retraction.     IMPRESSION:  1. In the posterior one third of the retroareolar region of the left  breast there is an area of focal asymmetry. Further evaluation with spot  compression, CC mammographic and Tomosynthesis images and targeted left  breast sonography is recommended.  2. There are no findings suspicious for malignancy in the right breast.     BI-RADS Category 0: Incomplete. Needs additional imaging evaluation    6/9/23 LEFT Diagnostic Mammogram  Narrative & Impression   UNILATERAL LEFT DIGITAL DIAGNOSTIC MAMMOGRAPHY WITH TOMOSYNTHESIS AND R2  COMPUTER-AIDED DETECTION WITH LIMITED LEFT BREAST SONOGRAPHY     HISTORY: 52-year-old female undergoing call back evaluation of the left  breast.     FINDINGS: Unilateral left digital, 90 degree lateral and spot  compression CC mammographic and tomosynthesis images  were obtained.  Comparison is made to prior mammography dated 05/05/2023 and 02/04/2021.  With additional imaging of the left breast there is partial persistence  of the area of focal asymmetry seen in the posterior one-third  retroareolar region of the left breast. The area becomes less focal  following spot compression imaging. However, the area does not resolve  and partially persists.     ULTRASOUND: Targeted sonographic evaluation of the left breast was  performed from the 11-o'clock through 1-o'clock positions and through  the retroareolar region. No sonographic abnormality is appreciated.     IMPRESSION:  There is partial resolution of an area of focal asymmetry  in the posterior one-third retroareolar region of the left breast. No  sonographic abnormality in the region is appreciated. The imaging  features suggest normal breast parenchyma. Six-month mammographic  follow-up and possible sonographic follow-up are recommended.     BI-RADS Category 3: Probably benign finding.         12/11/23 Left Diagnostic Mammogram and US  Projecting posterior/slightly medial to the nipple in the posterior left  breast, there is redemonstration of a partially effacing asymmetry  measuring approximately 0.8 cm. On the current examination, there is a  suspected correlate in the upper posterior left breast on the full  lateral view. Additionally, this area localizes to the upper breast on  tomosynthesis. This area was further assessed with ultrasound.  There are no new suspicious masses, calcifications, or areas of  architectural distortion.     ULTRASOUND:  Targeted sonographic evaluation of the left breast was performed from  10:00 to 1:00 in the region of the mammographic abnormality. No  suspicious cystic or solid mass is identified.        IMPRESSION:  Suspicious 0.8 cm focal asymmetry in the upper central to slightly inner  posterior left breast without a sonographic correlate. Recommend further  evaluation with  stereotactic/tomosynthesis guided core needle biopsy.     Findings and recommendations were discussed with the patient in person  at the time of her examination. An epic in basket message was sent to  CANDIE Culver on 12/11/2023.     BI-RADS Category 4: Suspicious      1/5/23 Left Stereotactic Biopsy  BOWTIE TISSUE MARKER  IMPRESSION:   1. Stereotactic/Tomosynthesis guided core needle biopsy of a focal  asymmetry at 12:00 in the posterior left breast, marked with a bowtie  biopsy clip. Pathology is benign and concordant with the imaging  assessment. Recommend annual screening mammogram in May 2024.    Pathology:  1. Left Breast, 12:00, Stereotactic-Guided Core Needle Biopsy for a Focal Asymmetry:               A. Breast parenchyma with fibroadenomatoid change and focal pseudoangiomatous stromal       hyperplasia (PASH).    Assessment & Plan   Assessment:  53 y.o. F with a new diagnosis of PASH, fibroadenomatoid change, PASH    Discussion:  We reviewed her imaging, history, exam, pathology, and family history together today. Her most significant risk factors for breast cancer include the following: mat aunt and mat cousin (her uncle's daughter) with breast cancer  I calculated her lifetime risk of breast cancer using the Tyrer-Cuzick model as: 13.1  I calculated her 10-year risk of breast cancer using the Tyer Mehama model as: 4.9        With a lifetime risk of breast cancer greater than 20%, the current recommendations for screening include annual mammography and annual MRI, with clinical examination. We also discussed that for a Tyrer-Cuzick 10 year risk of >5% or LCIS, and a life expectancy > 10 years, that we recommend risk reduction counseling with the medical oncologists about chemopreventive agents such as tamoxifen, raloxifene, or exemestane.     Plan:    - She does not meet high risk screening at this time. While she would qualify for genetic testing/evaluation due to aunt with breast cancer  diagnosed in her 40's she does no want to move forward with referral at this time.   - Self breast awareness including home self breast exams monthly to monitor for any new masses or changes to the breasts  - Mammograms starting at age 40 and continued annually unless there are changes to her personal or family history that would increase her breast cancer risk, next due May 2024  - RT for clinical exam for one additional encounter following her screening mammogram to ensure stability/no additional issues.          Pura Hunter MD  Breast Surgical Oncology    I spent 45 minutes caring for Ms Avalos on this date of service. This time includes time spent by me in the following activities: preparing for the visit, reviewing tests, obtaining and/or reviewing a separately obtained history, performing a medically appropriate examination and/or evaluation , counseling and educating the patient/family/caregiver, ordering medications, tests, or procedures, referring and communicating with other health care professionals , documenting information in the medical record, and independently interpreting results and communicating that information with the patient/family/caregiver.      CC:  CANDIE Culver Lauren, APRN Dr. Mann

## 2024-01-12 ENCOUNTER — PATIENT ROUNDING (BHMG ONLY) (OUTPATIENT)
Dept: SURGERY | Facility: CLINIC | Age: 54
End: 2024-01-12
Payer: COMMERCIAL

## 2024-01-12 NOTE — PROGRESS NOTES
January 12, 2024    Hello, may I speak with Kiera Avalos?    My name is Cheyenne      I am  with [facility]  [address] [address] [phone].    Before we get started may I verify your date of birth? 1970    I am calling to officially welcome you to our practice and ask about your recent visit. Is this a good time to talk? No Left voicemail to call back    Tell me about your visit with us. What things went well?         We're always looking for ways to make our patients' experiences even better. Do you have recommendations on ways we may improve?      Overall were you satisfied with your first visit to our practice?        I appreciate you taking the time to speak with me today. Is there anything else I can do for you?       Thank you, and have a great day.

## 2024-05-28 ENCOUNTER — TELEPHONE (OUTPATIENT)
Dept: INTERNAL MEDICINE | Facility: CLINIC | Age: 54
End: 2024-05-28
Payer: COMMERCIAL

## 2024-05-28 RX ORDER — HYDROCHLOROTHIAZIDE 12.5 MG/1
12.5 TABLET ORAL DAILY
Qty: 30 TABLET | Refills: 0 | Status: SHIPPED | OUTPATIENT
Start: 2024-05-28

## 2024-05-28 NOTE — TELEPHONE ENCOUNTER
Patient needs an appointment and fasting labs before any further refills are sent in, she is overdue.

## 2024-05-28 NOTE — TELEPHONE ENCOUNTER
Caller: Kiera Avalos    Relationship: Self    Best call back number: [phone]     What was the call regarding: PATIENT SCHEDULED EARLIEST AVAILABLE   ASKING FOR MEDICATION TO BE COVERED FROM THEN UNTIL NOW  hydroCHLOROthiazide 12.5 MG tablet   [facility] #[id number] - [place], [place] - [address] AT [facility] [phone] [facility] [phone]

## 2024-06-20 RX ORDER — HYDROCHLOROTHIAZIDE 12.5 MG/1
12.5 TABLET ORAL DAILY
Qty: 90 TABLET | Refills: 0 | Status: SHIPPED | OUTPATIENT
Start: 2024-06-20

## 2024-07-03 DIAGNOSIS — K21.9 GASTROESOPHAGEAL REFLUX DISEASE, UNSPECIFIED WHETHER ESOPHAGITIS PRESENT: ICD-10-CM

## 2024-07-03 DIAGNOSIS — E78.00 PURE HYPERCHOLESTEROLEMIA: ICD-10-CM

## 2024-07-03 DIAGNOSIS — D50.8 OTHER IRON DEFICIENCY ANEMIA: ICD-10-CM

## 2024-07-03 DIAGNOSIS — E53.8 VITAMIN B12 DEFICIENCY: ICD-10-CM

## 2024-07-03 DIAGNOSIS — E55.9 VITAMIN D DEFICIENCY: ICD-10-CM

## 2024-07-03 DIAGNOSIS — E66.01 CLASS 2 SEVERE OBESITY WITH SERIOUS COMORBIDITY AND BODY MASS INDEX (BMI) OF 35.0 TO 35.9 IN ADULT, UNSPECIFIED OBESITY TYPE: ICD-10-CM

## 2024-07-03 DIAGNOSIS — Z00.00 HEALTHCARE MAINTENANCE: Primary | ICD-10-CM

## 2024-07-05 ENCOUNTER — TELEPHONE (OUTPATIENT)
Dept: SURGERY | Facility: CLINIC | Age: 54
End: 2024-07-05
Payer: COMMERCIAL

## 2024-07-05 NOTE — TELEPHONE ENCOUNTER
PT will not return to our care until something concerning comes back on her annual mammos. She stated that she will let her PCP handle her annual screenings at this time.    MEN

## 2024-07-12 ENCOUNTER — OFFICE VISIT (OUTPATIENT)
Dept: INTERNAL MEDICINE | Facility: CLINIC | Age: 54
End: 2024-07-12
Payer: COMMERCIAL

## 2024-07-12 VITALS
WEIGHT: 188.3 LBS | HEART RATE: 56 BPM | SYSTOLIC BLOOD PRESSURE: 116 MMHG | BODY MASS INDEX: 35.55 KG/M2 | DIASTOLIC BLOOD PRESSURE: 72 MMHG | OXYGEN SATURATION: 98 % | TEMPERATURE: 99 F | HEIGHT: 61 IN

## 2024-07-12 DIAGNOSIS — I89.0 LYMPHEDEMA: ICD-10-CM

## 2024-07-12 DIAGNOSIS — Z00.00 HEALTHCARE MAINTENANCE: Primary | ICD-10-CM

## 2024-07-12 DIAGNOSIS — E53.8 VITAMIN B12 DEFICIENCY: ICD-10-CM

## 2024-07-12 DIAGNOSIS — E55.9 VITAMIN D DEFICIENCY: ICD-10-CM

## 2024-07-12 DIAGNOSIS — Z12.31 ENCOUNTER FOR SCREENING MAMMOGRAM FOR MALIGNANT NEOPLASM OF BREAST: ICD-10-CM

## 2024-07-12 DIAGNOSIS — K21.9 GASTROESOPHAGEAL REFLUX DISEASE, UNSPECIFIED WHETHER ESOPHAGITIS PRESENT: ICD-10-CM

## 2024-07-12 DIAGNOSIS — E66.01 CLASS 2 SEVERE OBESITY WITH SERIOUS COMORBIDITY AND BODY MASS INDEX (BMI) OF 35.0 TO 35.9 IN ADULT, UNSPECIFIED OBESITY TYPE: ICD-10-CM

## 2024-07-12 DIAGNOSIS — Z01.419 ENCOUNTER FOR WELL WOMAN EXAM WITH ROUTINE GYNECOLOGICAL EXAM: ICD-10-CM

## 2024-07-12 DIAGNOSIS — E78.00 PURE HYPERCHOLESTEROLEMIA: ICD-10-CM

## 2024-07-12 DIAGNOSIS — D50.8 OTHER IRON DEFICIENCY ANEMIA: ICD-10-CM

## 2024-07-12 PROCEDURE — 99396 PREV VISIT EST AGE 40-64: CPT | Performed by: NURSE PRACTITIONER

## 2024-07-12 NOTE — PROGRESS NOTES
Subjective   Kiera Avalos is a 53 y.o. female.     Chief Complaint   Patient presents with    Annual Exam        History of Present Illness   She is here today for CPE and lab follow-up. She is feeling ok today. She is planning to return to regular exercise and healthy eating.  She recently returned from a trip to [facility] for her brother-in-law's wedding.    HLD- she has been struggling some with healthy eating and exercise recently. The 10-year ASCVD risk score (Prashant DK, et al., 2019) is: 1.6%    Values used to calculate the score:      Age: 53 years      Sex: Female      Is Non- : No      Diabetic: No      Tobacco smoker: No      Systolic Blood Pressure: 116 mmHg      Is BP treated: No      HDL Cholesterol: 77 mg/dL      Total Cholesterol: 269 mg/dL    Lymphedema- she notes improvement in symptoms. She has been doing home PT and using compression with improvement. She is taking hctz 12.5 mg daily.     Obesity- she previously tried Qsymia, but did not tolerate this. She notes an increase in cravings recently.  She feels like she has a difficult time with satiety.  She has been struggling with healthy eating regular exercise but is planning to return to this.    Iron deficiency anemia- she is currently taking ferrous gluconate 324 mg daily.  She has previously seen Dr. Don with hematology.  Vitamin B12 deficiency- she is taking a MTV daily.   Vitamin D deficiency- she is currently taking daily vit D3.     GERD-well-controlled with Nexium 20 mg daily.  She denies any dysphagia or odynophagia.  Colon cancer screening- she is UTD with c-scope, screened every 10 years.  She denies any change in bowel habits, change in stool caliber, BRBPR, melena, abdominal pain.    GYN- she was seen by Dr. Hunter with breast surgery for abnormal mammogram, breast biopsy of the left breast completed, benign pathology. She is needing a referral to GYN to establish care. She would like to wait until 2025  for next screening mammogram.     The following portions of the patient's history were reviewed and updated as appropriate: allergies, current medications, past family history, past medical history, past social history, past surgical history, and problem list.    Review of Systems   Constitutional: Negative.    HENT: Negative.     Eyes: Negative.    Respiratory: Negative.     Cardiovascular:  Positive for leg swelling. Negative for chest pain and palpitations.   Gastrointestinal: Negative.    Endocrine: Negative.    Genitourinary: Negative.    Musculoskeletal: Negative.    Skin: Negative.    Allergic/Immunologic: Negative.    Neurological: Negative.    Hematological: Negative.    Psychiatric/Behavioral: Negative.         Objective   Physical Exam  Constitutional:       Appearance: Normal appearance. She is well-developed.   HENT:      Head: Normocephalic and atraumatic.      Right Ear: Hearing, tympanic membrane, ear canal and external ear normal.      Left Ear: Hearing, tympanic membrane, ear canal and external ear normal.      Nose: Nose normal.      Right Sinus: No maxillary sinus tenderness or frontal sinus tenderness.      Left Sinus: No maxillary sinus tenderness or frontal sinus tenderness.      Mouth/Throat:      Lips: Pink.      Mouth: Mucous membranes are moist.      Dentition: Normal dentition.      Tongue: No lesions.      Pharynx: Oropharynx is clear. Uvula midline.      Tonsils: No tonsillar exudate.   Eyes:      General: Lids are normal.      Extraocular Movements: Extraocular movements intact.      Conjunctiva/sclera: Conjunctivae normal.      Pupils: Pupils are equal, round, and reactive to light.   Neck:      Thyroid: No thyroid mass, thyromegaly or thyroid tenderness.      Vascular: No carotid bruit.      Trachea: Trachea normal.   Cardiovascular:      Rate and Rhythm: Normal rate and regular rhythm.      Pulses: Normal pulses.           Radial pulses are 2+ on the right side and 2+ on the left  side.        Popliteal pulses are 2+ on the right side and 2+ on the left side.        Dorsalis pedis pulses are 2+ on the right side and 2+ on the left side.        Posterior tibial pulses are 2+ on the right side and 2+ on the left side.      Heart sounds: S1 normal and S2 normal.   Pulmonary:      Effort: Pulmonary effort is normal.      Breath sounds: Normal breath sounds.   Abdominal:      General: Bowel sounds are normal. There is no distension or abdominal bruit.      Palpations: Abdomen is soft. There is no hepatomegaly or splenomegaly.      Tenderness: There is no abdominal tenderness.      Hernia: No hernia is present.   Musculoskeletal:      Cervical back: Normal range of motion and neck supple.      Right lower leg: No edema.      Left lower leg: No edema.   Lymphadenopathy:      Head:      Right side of head: No submental, submandibular, tonsillar or occipital adenopathy.      Left side of head: No submental, submandibular, tonsillar or occipital adenopathy.      Cervical: No cervical adenopathy.      Upper Body:      Right upper body: No supraclavicular adenopathy.      Left upper body: No supraclavicular adenopathy.      Lower Body: No right inguinal adenopathy. No left inguinal adenopathy.      Comments: Bilateral lower extremity lymphedema.   Skin:     General: Skin is warm and dry.      Findings: No rash.      Nails: There is no clubbing.   Neurological:      General: No focal deficit present.      Mental Status: She is alert and oriented to person, place, and time.      Cranial Nerves: Cranial nerves 2-12 are intact.      Sensory: Sensation is intact.      Motor: Motor function is intact.      Coordination: Coordination is intact.      Gait: Gait is intact.      Deep Tendon Reflexes:      Reflex Scores:       Patellar reflexes are 2+ on the right side and 2+ on the left side.  Psychiatric:         Attention and Perception: Attention and perception normal.         Mood and Affect: Mood and affect  normal.         Speech: Speech normal.         Behavior: Behavior normal. Behavior is cooperative.         Thought Content: Thought content normal.         Cognition and Memory: Cognition and memory normal.         Judgment: Judgment normal.         Vitals:    07/12/24 0930   BP: 116/72   Pulse: 56   Temp: 99 °F (37.2 °C)   SpO2: 98%      Body mass index is 35.6 kg/m².    Assessment & Plan   Problems Addressed this Visit       Gastroesophageal reflux disease    Iron deficiency anemia    Relevant Orders    CBC & Differential    Ferritin    Iron Profile    Vitamin B12 deficiency    Vitamin D deficiency    Pure hypercholesterolemia    Relevant Orders    Lipid Panel With LDL / HDL Ratio    Class 2 severe obesity with serious comorbidity and body mass index (BMI) of 35.0 to 35.9 in adult    Lymphedema    Relevant Orders    Basic Metabolic Panel     Other Visit Diagnoses       Healthcare maintenance    -  Primary    Encounter for well woman exam with routine gynecological exam        Relevant Orders    Ambulatory Referral to Gynecology (Completed)    Encounter for screening mammogram for malignant neoplasm of breast        Relevant Orders    Mammo Screening Digital Tomosynthesis Bilateral With CAD          Diagnoses         Codes Comments    Healthcare maintenance    -  Primary ICD-10-CM: Z00.00  ICD-9-CM: V70.0     Encounter for well woman exam with routine gynecological exam     ICD-10-CM: Z01.419  ICD-9-CM: V72.31     Encounter for screening mammogram for malignant neoplasm of breast     ICD-10-CM: Z12.31  ICD-9-CM: V76.12     Other iron deficiency anemia     ICD-10-CM: D50.8  ICD-9-CM: 280.8     Pure hypercholesterolemia     ICD-10-CM: E78.00  ICD-9-CM: 272.0     Lymphedema     ICD-10-CM: I89.0  ICD-9-CM: 457.1     Gastroesophageal reflux disease, unspecified whether esophagitis present     ICD-10-CM: K21.9  ICD-9-CM: 530.81     Class 2 severe obesity with serious comorbidity and body mass index (BMI) of 35.0 to 35.9  in adult, unspecified obesity type     ICD-10-CM: E66.01, Z68.35  ICD-9-CM: 278.01, V85.35     Vitamin B12 deficiency     ICD-10-CM: E53.8  ICD-9-CM: 266.2     Vitamin D deficiency     ICD-10-CM: E55.9  ICD-9-CM: 268.9           Lab results discussed with patient in office today.    1.  Preventative counseling-encouraged to return to healthy, balanced eating and regular exercise with resistance training.  Ensure adequate dietary take calcium and vitamin D.  2.  HLD-LDL up some.  ASCVD risk discussion held patient today with 10-year risk of 1.6%.  Encouraged return to Mediterranean-style eating and regular exercise.  Recheck labs in 6 months.  If still elevated recommend vascular screening.  3.  Lymphedema-improving.  Continue compression and home PT exercises.  Continue hydrochlorothiazide 12.5 mg daily.  Recheck labs in 6 months.  4.  Iron deficiency anemia-much improved.  Continue oral ferrous gluconate 324 mg daily.  Recheck labs in 6 months.  5.  B12 deficiency-stable.  Continue daily multivitamin.  6.  Vitamin D deficiency-much improved.  Continue oral vitamin D supplement daily.  7.  GERD-stable.  Continue Nexium 20 mg daily along with reflux precautions.  Return to healthy eating and regular exercise to promote weight loss.  Notify for any dysphagia or odynophagia.  8.  Obesity-encouraged return to healthy eating focusing on Mediterranean-style diet, optimizing protein and fiber intake, limiting sugar and carbohydrate intake along with return to regular exercise.  “Discussed risks/benefits to vaccination, reviewed components of the vaccine, discussed VIS, discussed informed consent, informed consent obtained. Patient/Parent was allowed to accept or refuse vaccine. Questions answered to satisfactory state of patient/Parent. We reviewed typical age appropriate and seasonally appropriate vaccinations. Reviewed immunization history and updated state vaccination form as needed. Patient was counseled on  Tdap  Zoster    Encouraged routine dental and eye exam  GYN-referral placed  Mammogram-discussed recommendations for screening mammogram now.  Patient would like to wait until 2025.  Order placed today.  Encouraged monthly BSE.  C-scope up-to-date  Encouraged sunscreen use outside    Repeat labs in 6 months, will call with results.  Follow-up in 1 year for CPE with fasting labs prior.

## 2024-09-18 RX ORDER — HYDROCHLOROTHIAZIDE 12.5 MG/1
12.5 TABLET ORAL DAILY
Qty: 90 TABLET | Refills: 0 | Status: SHIPPED | OUTPATIENT
Start: 2024-09-18

## 2024-12-10 ENCOUNTER — DOCUMENTATION (OUTPATIENT)
Dept: OCCUPATIONAL THERAPY | Facility: HOSPITAL | Age: 54
End: 2024-12-10
Payer: COMMERCIAL

## 2024-12-10 DIAGNOSIS — I89.0 LYMPHEDEMA, NOT ELSEWHERE CLASSIFIED: Primary | ICD-10-CM

## 2024-12-10 NOTE — THERAPY TREATMENT NOTE
Outpatient Occupational Therapy Lymphedema Treatment Note       Patient Name: Kiera Avalos  : 1970  MRN: [id number]  Today's Date: 12/10/2024      Visit Date: 12/10/2024    Patient Active Problem List   Diagnosis    Gastroesophageal reflux disease    History of bariatric surgery    Iron deficiency anemia    Seizure    Vitamin B12 deficiency    Vitamin D deficiency    Chronic constipation    Pure hypercholesterolemia    H/O gastric sleeve    Class 2 severe obesity with serious comorbidity and body mass index (BMI) of 35.0 to 35.9 in adult    Lymphedema    Lipoma of back    Absolute anemia    Malabsorption of iron        Past Medical History:   Diagnosis Date    Anemia     Iron deficiency-HISTORY OF IRON INFUSIONS-FOLLOWED BY HEMATOLOGY    Epilepsy     LAST SEIZURE     GERD (gastroesophageal reflux disease)     Lymphedema         Past Surgical History:   Procedure Laterality Date    BARIATRIC SURGERY      BREAST BIOPSY Left      SECTION      CHOLECYSTECTOMY      COLONOSCOPY N/A 2022    Procedure: COLONOSCOPY;  Surgeon: Troy Pittman MD;  Location: [facility] [place] MAIN OR;  Service: Gastroenterology;  Laterality: N/A;  normal    EXCISION MASS TRUNK Left 2023    Procedure: Excision of left upper back lipoma;  Surgeon: Cecy Villasenor MD;  Location: [facility] MAIN OR;  Service: General;  Laterality: Left;    FOOT SURGERY Right     lesion    TONSILLECTOMY      TUBAL ABDOMINAL LIGATION           Visit Dx:      ICD-10-CM ICD-9-CM   1. Lymphedema, not elsewhere classified  I89.0 457.1                    OT Assessment/Plan       Row Name 12/10/24 1356          OT Assessment    Assessment Comments For phase 2 maintenance phase of lymphedema treatment pt. needs the following: Circaid Juxtafit Essentials lower leg, size short, large quantity 2.  -CW               User Key  (r) = Recorded By, (t) = Taken By, (c) = Cosigned By      Initials Name Provider Type    CW Serene,  NILTON ElizabethR Occupational Therapist                                               Time Calculation:                         ZORAIDA Jason  12/10/2024

## 2024-12-11 ENCOUNTER — DOCUMENTATION (OUTPATIENT)
Dept: OCCUPATIONAL THERAPY | Facility: HOSPITAL | Age: 54
End: 2024-12-11
Payer: COMMERCIAL

## 2024-12-11 DIAGNOSIS — I89.0 LYMPHEDEMA, NOT ELSEWHERE CLASSIFIED: Primary | ICD-10-CM

## 2024-12-11 NOTE — THERAPY TREATMENT NOTE
Outpatient Occupational Therapy Lymphedema Treatment Note       Patient Name: Kiera Avalos  : 1970  MRN: [id number]  Today's Date: 2024      Visit Date: 2024    Patient Active Problem List   Diagnosis    Gastroesophageal reflux disease    History of bariatric surgery    Iron deficiency anemia    Seizure    Vitamin B12 deficiency    Vitamin D deficiency    Chronic constipation    Pure hypercholesterolemia    H/O gastric sleeve    Class 2 severe obesity with serious comorbidity and body mass index (BMI) of 35.0 to 35.9 in adult    Lymphedema    Lipoma of back    Absolute anemia    Malabsorption of iron        Past Medical History:   Diagnosis Date    Anemia     Iron deficiency-HISTORY OF IRON INFUSIONS-FOLLOWED BY HEMATOLOGY    Epilepsy     LAST SEIZURE     GERD (gastroesophageal reflux disease)     Lymphedema         Past Surgical History:   Procedure Laterality Date    BARIATRIC SURGERY      BREAST BIOPSY Left      SECTION      CHOLECYSTECTOMY      COLONOSCOPY N/A 2022    Procedure: COLONOSCOPY;  Surgeon: Troy Pittman MD;  Location: [facility] [place] MAIN OR;  Service: Gastroenterology;  Laterality: N/A;  normal    EXCISION MASS TRUNK Left 2023    Procedure: Excision of left upper back lipoma;  Surgeon: Cecy Villasenor MD;  Location: [facility] MAIN OR;  Service: General;  Laterality: Left;    FOOT SURGERY Right     lesion    TONSILLECTOMY      TUBAL ABDOMINAL LIGATION           Visit Dx:      ICD-10-CM ICD-9-CM   1. Lymphedema, not elsewhere classified  I89.0 457.1                    OT Assessment/Plan       Row Name 24 0745          OT Assessment    Assessment Comments For phase 2 maintenance phase of lymphedema treatment pt. needs the following: Circaid Juxtafit Essentials lower leg, size short, large, quantity 6 (3 for each leg).  -CW               User Key  (r) = Recorded By, (t) = Taken By, (c) = Cosigned By      Initials Name Provider Type     Clara Barrios OTR Occupational Therapist                                               Time Calculation:                         ZORAIDA Jason  12/11/2024

## 2024-12-13 ENCOUNTER — DOCUMENTATION (OUTPATIENT)
Dept: OCCUPATIONAL THERAPY | Facility: HOSPITAL | Age: 54
End: 2024-12-13
Payer: COMMERCIAL

## 2024-12-13 DIAGNOSIS — I89.0 LYMPHEDEMA, NOT ELSEWHERE CLASSIFIED: Primary | ICD-10-CM

## 2024-12-13 NOTE — THERAPY TREATMENT NOTE
Outpatient Occupational Therapy Lymphedema Treatment Note       Patient Name: Kiera Avalos  : 1970  MRN: [id number]  Today's Date: 2024      Visit Date: 2024    Patient Active Problem List   Diagnosis    Gastroesophageal reflux disease    History of bariatric surgery    Iron deficiency anemia    Seizure    Vitamin B12 deficiency    Vitamin D deficiency    Chronic constipation    Pure hypercholesterolemia    H/O gastric sleeve    Class 2 severe obesity with serious comorbidity and body mass index (BMI) of 35.0 to 35.9 in adult    Lymphedema    Lipoma of back    Absolute anemia    Malabsorption of iron        Past Medical History:   Diagnosis Date    Anemia     Iron deficiency-HISTORY OF IRON INFUSIONS-FOLLOWED BY HEMATOLOGY    Epilepsy     LAST SEIZURE     GERD (gastroesophageal reflux disease)     Lymphedema         Past Surgical History:   Procedure Laterality Date    BARIATRIC SURGERY      BREAST BIOPSY Left      SECTION      CHOLECYSTECTOMY      COLONOSCOPY N/A 2022    Procedure: COLONOSCOPY;  Surgeon: Troy Pittman MD;  [place] [facility] [place];  Service: Gastroenterology;  Laterality: N/A;  normal    EXCISION MASS TRUNK Left 2023    Procedure: Excision of left upper back lipoma;  Surgeon: Cecy Villasenor MD;  Location: [facility] MAIN OR;  Service: General;  Laterality: Left;    FOOT SURGERY Right     lesion    TONSILLECTOMY      TUBAL ABDOMINAL LIGATION           Visit Dx:      ICD-10-CM ICD-9-CM   1. Lymphedema, not elsewhere classified  I89.0 457.1                    OT Assessment/Plan       Row Name 24 1426          OT Assessment    Assessment Comments Pt. has a long hx of LE edema which presents with signs and symptoms consistent with BLE lymphedema feet to knees. Pt. requires daytime custom B knee high compression garments. Pt. would benefit from Medi Cosy class 2, knee highs , 5 cm wide silicone beaded top band, closed toe  oblique foot cashmere with Y-knitting marks (quantity 2) one for each leg, 4 refills as needed. A custom flat knit is necessary to accomodate and delver gradient compression throughout the affected limbs due to the unitque shape of her legs. A flat knit is less likely to cut into her skin and can offer better containment that is more comfortable for pt. A circular knit has potential to cause pressure areas and is not appropriate for this pt due to the shape of her legs and fluctuating edema. A flat knit will hold in place better when pt. is walking and has a strong material that can bridge the skin folds.  -MARIO ALBERTO               User Key  (r) = Recorded By, (t) = Taken By, (c) = Cosigned By      Initials Name Provider Type    Clara Barrios OTR Occupational Therapist                                               Time Calculation:                         ZORAIDA Jason  12/13/2024

## 2024-12-23 RX ORDER — HYDROCHLOROTHIAZIDE 12.5 MG/1
12.5 TABLET ORAL DAILY
Qty: 90 TABLET | Refills: 0 | Status: SHIPPED | OUTPATIENT
Start: 2024-12-23

## 2024-12-25 ENCOUNTER — HOSPITAL ENCOUNTER (EMERGENCY)
Facility: HOSPITAL | Age: 54
Discharge: HOME | End: 2024-12-25
Attending: EMERGENCY MEDICINE | Admitting: EMERGENCY MEDICINE
Payer: COMMERCIAL

## 2024-12-25 ENCOUNTER — APPOINTMENT (OUTPATIENT)
Dept: GENERAL RADIOLOGY | Facility: HOSPITAL | Age: 54
End: 2024-12-25
Payer: COMMERCIAL

## 2024-12-25 VITALS
HEIGHT: 61 IN | DIASTOLIC BLOOD PRESSURE: 73 MMHG | OXYGEN SATURATION: 98 % | RESPIRATION RATE: 16 BRPM | SYSTOLIC BLOOD PRESSURE: 118 MMHG | HEART RATE: 96 BPM | TEMPERATURE: 99.1 F | BODY MASS INDEX: 35.55 KG/M2 | WEIGHT: 188.27 LBS

## 2024-12-25 DIAGNOSIS — J10.1 INFLUENZA A: Primary | ICD-10-CM

## 2024-12-25 LAB
FLUAV SUBTYP SPEC NAA+PROBE: DETECTED
FLUBV RNA ISLT QL NAA+PROBE: NOT DETECTED
SARS-COV-2 RNA RESP QL NAA+PROBE: NOT DETECTED

## 2024-12-25 PROCEDURE — 94761 N-INVAS EAR/PLS OXIMETRY MLT: CPT

## 2024-12-25 PROCEDURE — 99283 EMERGENCY DEPT VISIT LOW MDM: CPT

## 2024-12-25 PROCEDURE — 96372 THER/PROPH/DIAG INJ SC/IM: CPT

## 2024-12-25 PROCEDURE — 25010000002 KETOROLAC TROMETHAMINE PER 15 MG: Performed by: NURSE PRACTITIONER

## 2024-12-25 PROCEDURE — 71046 X-RAY EXAM CHEST 2 VIEWS: CPT

## 2024-12-25 PROCEDURE — 94760 N-INVAS EAR/PLS OXIMETRY 1: CPT

## 2024-12-25 PROCEDURE — 94799 UNLISTED PULMONARY SVC/PX: CPT

## 2024-12-25 PROCEDURE — 87636 SARSCOV2 & INF A&B AMP PRB: CPT | Performed by: NURSE PRACTITIONER

## 2024-12-25 PROCEDURE — 94664 DEMO&/EVAL PT USE INHALER: CPT

## 2024-12-25 PROCEDURE — 63710000001 PREDNISONE PER 1 MG: Performed by: NURSE PRACTITIONER

## 2024-12-25 RX ORDER — AZITHROMYCIN 250 MG/1
TABLET, FILM COATED ORAL
Qty: 6 TABLET | Refills: 0 | Status: SHIPPED | OUTPATIENT
Start: 2024-12-27

## 2024-12-25 RX ORDER — PREDNISONE 20 MG/1
60 TABLET ORAL ONCE
Status: COMPLETED | OUTPATIENT
Start: 2024-12-25 | End: 2024-12-25

## 2024-12-25 RX ORDER — KETOROLAC TROMETHAMINE 30 MG/ML
30 INJECTION, SOLUTION INTRAMUSCULAR; INTRAVENOUS ONCE
Status: COMPLETED | OUTPATIENT
Start: 2024-12-25 | End: 2024-12-25

## 2024-12-25 RX ORDER — IPRATROPIUM BROMIDE AND ALBUTEROL SULFATE 2.5; .5 MG/3ML; MG/3ML
3 SOLUTION RESPIRATORY (INHALATION) ONCE
Status: COMPLETED | OUTPATIENT
Start: 2024-12-25 | End: 2024-12-25

## 2024-12-25 RX ADMIN — IPRATROPIUM BROMIDE AND ALBUTEROL SULFATE 3 ML: .5; 3 SOLUTION RESPIRATORY (INHALATION) at 10:12

## 2024-12-25 RX ADMIN — PREDNISONE 60 MG: 20 TABLET ORAL at 09:36

## 2024-12-25 RX ADMIN — KETOROLAC TROMETHAMINE 30 MG: 30 INJECTION, SOLUTION INTRAMUSCULAR at 09:32

## 2024-12-25 NOTE — ED PROVIDER NOTES
SHARED VISIT: This visit was performed by BOTH a physician and an APC. The substantive portion of the medical decision making was performed by this attesting physician who made or approved the management plan and takes responsibility for patient management. All studies in the APC note (if performed) were independently interpreted by me.      The ROCIO and I have discussed this patient's history, physical exam, and treatment plan.  I have reviewed the documentation and personally had a face to face interaction with the patient. I affirm the documentation and agree with the treatment and plan.  The attached note describes my personal findings.       I provided a substantive portion of the care of the patient.  I personally performed the physical exam in its entirety, and below are my findings.        History  54-year-old female presents with cough and congestion that began about 5 days ago.  She has had fever and bodyaches.    Physical Exam  Vital Signs reviewed  GENERAL: Alert well-appearing female no obvious distress.  Triage vitals reviewed notable for O2 sats 99% on room air.  Blood pressure, heart rate and temperature are fairly benign as noted  HENT: nares patent  EYES: no scleral icterus  CV: regular rhythm, regular rate-no murmur  RESPIRATORY: normal effort, O2 sats upper 90s room air  ABDOMEN: soft  MUSCULOSKELETAL: no deformity  NEURO: Strength sensation and coordination are grossly intact.  Speech and mentation are unremarkable  SKIN: warm, dry      Assessment and Data Review    ED Course as of 12/25/24 1019   Wed Dec 25, 2024   0943 I viewed CXR in PACS.  My independent interpretation is no acute infiltrate.  [EW]   1013 Influenza A PCR(!): Detected [EW]      ED Course User Index  [EW] Vanessa Gordon, CANDIE       I did discuss treatment and evaluation of this patient with ROXANNA Gordon.    I did independently interpret and evaluate ED testing which is notable for the following:    Chest x-ray independently  interpreted by me shows no obvious acute disease.  Viral panel is positive for influenza A.    Will treat supportively.  O2 sats benign.  Admission not indicated at this time     Saulo Jensen MD  12/25/24 0746

## 2024-12-25 NOTE — DISCHARGE INSTRUCTIONS
"Stop Mucinex    Please read through the information below. This information will provide you with additional instructions for home care.    Please start on a multivitamin if you are not already taking one. The best multivitamin available is the Prenatal Vitamin. It does not matter if you are a male or female. You are capable of taking this supplement. Vitamin C, Calcium, Magnesium and Zinc have shown with limited research data to be beneficial for helping the body fight off infections. [employer] have a single pill with Vitamin C, Zinc, Magnesium plus Vitamin D3. This is the most convient supplement to take with all of the additional key supplements for immune support.    COLD AND FLU     Colds are very common viral illnesses that occur year round, but primarily in winter months. Because there are many viruses that cause colds, people may experience multiple colds per year, or find that their symptoms vary with each infection. The flu is also caused by a virus, but is due to a specific virus called influenza virus. The typical symptoms of the flu are high fever, body aches, fatigue, and headache.     The main treatment for these viral infections is supportive care.     Supportive treatment consists of taking medications to treat the symptoms, so that you are more comfortable while you are recovering from the illness. Colds usually last 7-14 days and the flu about 7 days.     Over-the-counter medications for symptomatic relief may include: Mucinex (maximum 3 days), Sudafed, DayQuil/NyQuil, flonase nasal spray.  If you have history of high blood pressure please use caution with these medications.  Check with pharmacist for recommendations.  Coricidin has brand \"HBP\" which is better for patient's with high blood pressure.       If you tested positive for the flu, Tamiflu and Xofluza are prescription antiviral medications that work best if taken in the first 24 to 48 hours. If you have had symptoms longer " "than 48 hours this medication will not be beneficial. Research shows no decrease in length or severity of illness when Tamiflu is not started within 48 hours. These medications were considered when looking at your illness, symptoms and severity of your illness.     Antibiotics are also not beneficial for a viral illness. Antibiotics only work against bacteria, and not viruses.     Please read through the information below. This information will provide you with additional instructions for home care.    VIRAL RESPIRATORY INFECTIONS CAN CAUSE: SINUSITIS, CHEST COLDS, BRONCHITIS, SORE THROAT, EAR ACHES, EAR INFECTIONS, RESPIRATORY FLU, STOMACH FLU.    -ANTIBIOTICS SHOULD NOT BE USED FOR VIRAL INFECTIONS. OUR BODIES NEED 10-14 DAYS TO \"FIGHT OFF\" VIRAL INFECTIONS.   -TAKE ANTIBIOTICS ONLY IF: RECOMMENDED BY YOUR PROVIDER, YOU ARE GETTING WORSE, YOU ARE NOT GETTING BETTER IN 10-14 DAYS.  -AVOID UNNECESSARY ANTIBIOTICS, THE MORE ANTIBIOTICS PEOPLE USE THE MORE LIKELY THEY ARE TO GET INFECTIONS THAT LAST LONGER AND ARE MORE DIFFICULT TO TREAT  -COLD SYMPTOMS INCLUDE: RUNNY NOSE, NASAL CONGESTION, SNEEZING, SORE THROAT, COUGH AND HEADACHE  -CHILDREN WILL OFTEN RUN A FEVER -102 F  -COLDS OCCUR ALL YEAR ROUND  -CHILDREN WILL TYPICALLY GET 8 COLDS A YEAR    Virus precautions, simple things to do at home to help with illness:     Wash/sanitize common household surfaces with antibacterial wipes.  Especially door knobs, light switches, tablets, remote controls, game consoles, cell phones.     Change bed linens and wash bath towels/washcloths    Frequent handwashing    Cough/sneeze into your sleeve      Within one to three days, the nasal secretions usually become thicker and perhaps yellow or green. This is a normal part of the common cold and not a reason for antibiotics. Symptoms usually go away in 7-10 days. If symptoms do not resolve in 7-10 days or worsen despite recommended treatment then re-evaluation must be sought " as a viral infection can turn into a bacterial infection    Please try OTC therapy for 3-5 days. This should help with your symptoms. It would be best to start on an OTC allergy pill or an OTC medication to treat the symptoms which you have.     Take an Allergy pill for congestion, runny nose, itchy watery eyes, sinus pain or pressure.     Tylenol/Ibuprofen (age appropriate dose) for pain or fever.     Additionally, plenty of rest and fluids are also important.     Below is a list of what to do for common symptoms associated with a cold or the flu. The recommendations below are for adult patients. Please make sure the treatments you use are age appropriate as not all information noted below can be given to children.       1. SORE THROAT Ibuprofen (also called Motrin & Advil) and acetaminophen (APAP or Tylenol ) are the most effective pain relievers for a sore throat.     Adults can take 2-3 ibuprofen every 6 hours with food or Tylenol 1000 mg up to 3 times a day as needed. Additionally, salt water gargles (1 tsp. salt in 1 cup of warm water) and lozenges may provide temporary relief of a sore throat.     For children over the age of two and adults - a spoonful of honey may be beneficial for both sore throat and cough.     For children, please follow the instructions on the package. There are different formularies to consider dosing    Sore throats are caused by many medical conditions including allergies, a virus, sinus issues, strep, and more.  Try to manage your sore throat at home. If over the counter medication has not been beneficial and your symptoms are continuing to worsen we ask you seek evaluation.    A severe sore throat should be evaluated by a practitioner, especially if accompanied by a fever over 100.3.   In talking with patients, most say that spraying with chloraseptic is not helpful.    Salt water gargles will help a sore throat.  For salt water gargles combine 1 teaspoon salt to 8 ounces of warm  water and swish and spit.  This can be done 4-5 times a day with a fresh batch of salt and warm water      2. NASAL CONGESTION The best nasal decongestant for most people is pseudoephedrine (Sudafed or a generic, but NOT Sudafed PE). For adults, the usual dose is 60 mg every 4 to 6 hours. This helps with a runny nose and clogged nasal passages, making it easier to breathe. Because Sudafed can be used to make illegal drugs, it is kept behind the counter at your local drugstore. This means you have to ask for the medication and are required to show your ’s license to buy it. Sudafed may make it difficult to sleep at night, so try to avoid taking any before bed if it bothers you. People with high blood pressure should not take pseudoephedrine, phenylephrine, or Afrin nasal spray as all of these can increase blood pressure and potentially lead to strokes. Do not use Afrin nasal spray longer than 3 days, as it can make nasal congestion worse if used too long.     For children over the age of 6 you may use Delsym from over the counter.    Zyrtec or Claritin is also beneficial for allergy symptoms including nasal congestion    For nasal congestion at night, or if you have high blood pressure and can’t take Sudafed or Afrin, use decongestants that come with an antihistamine (such as Coricidan HBP Cold and Flu or Benadryl). These can sometimes make you sleepy, so be sure to see how your body reacts to the drug before driving or going to work. Saline nasal sprays may also be helpful. Many patients find the Neti-pot saline rinse to be useful in clearing out their sinuses as well.    3. COUGH A good all-purpose cough medicine is Delsym. It contains a long-acting version of dextromethorphan, a 12 hour cough suppressant, to help keep the cough under control. If you feel like you have mucus in the chest as well, you could try a cough suppressant combined with an expectorant.     Examples of this include Robitussin DM (every  4 to 6 hours) or Mucinex DM (every 12 hours). If the cough is not helped by this or if you have trouble breathing, please see a practitioner.     Zyrtec or Claritin will help with a cough caused by post-nasal drainage or allergy related congestion.    4. FEVER/BODY ACHES - You do not need to be seen at the first sign of a fever. A fever is a sign your immune system is working appropriately. A fever alone does not mean you need to be evaluated. The other symptoms, concerns, and health conditions including the instructions your personal family physician and specialists should be considered. Please follow their instruction. If your fever persists for more than 24 to 48 hours and you are concerned for a significant illness please use common sense and seek evaluation. Ibuprofen and acetaminophen will help control fevers, as well as the aches and pains associated with a viral illness. See the sore throat section for proper doses of these medications. Be sure not to exceed the recommended dose for each medicine, and remember that it’s best to take Advil with food.    *NOTE OF CAUTION: Many over the counter cold preparations also contain Tylenol/acetaminophen. Be sure to check cold remedy labels so that you do not take too much Tylenol by mistake. Maximum is 3,000 mg per 24 hours.     Influenza is a viral illness. Generally this infection has to run it's course. It can take 7-14 days for a viral illness to pass. Please treat your symptoms with over the counter medication. If your test was positive, treatment with Tamiflu or Xofluza was considered. You have to meet specific criteria in order to be eligible for this treatment. If this medication was prescribed for you, please start on it immediately. If your symptoms have been present more than 2-3 days it may not be effective at helping you through your illness.     COVID-19 is another viral infection that we now combate within our society. This infection has shown to cause  many health complications. If you were COVID positive there is additional therapies you may take to help with symptoms. These additional therapies were considered during your office visit today. Please seek re-evaluation immediately if you develop concerns for pneumonia or other secondary complication from this infection.    If you have a personal or family history of blood clots (DVT), pulumonary embolism (PE), stroke, or heart attack or are at high risk for any of these complications then please talk with your doctor or nurse practitioner about starting on a baby aspirin (81 mg tablet). These have shown to be beneficial to prevent the above complications from occurring or recurring.    If you develop gastritis (irritation of the stomach) including heartburn or indigestion then over the counter PEPCID may be beneficial. Take this medication as directed on the package.    If you develop loss of taste or smell, peppermint candy or the use of peppermint essential oil in a home diffuser for aromatic purposes may be beneficial. Please remember everything in life has risks and benefits. Neither of these are without risks and you must consider your own health conditions and home situation. Do not diffuse peppermint essential oil in a closed room where pets are unable to escape.    Generally a viral illness is worse the first 3-5 days. Over the counter medication and home therapy will help with your symptoms. Management of your illness at home for a few days will provide us with additional information on how to help you manage your illness. Antibiotics will only be beneficial if your symptoms are caused by a bacteria.     If your symptoms do not improve in 7-10 days or your symptoms become worse despite the recommendations above then please seek re-evaluation.    When this treatment fails or symptoms continue to worsen despite this treatment, re-evaluation is advised as your viral infection or allergy flare up can turn  into a secondary bacterial infection where antibiotics would be recommended    Your diagnosis today is based on the information you provided me during today's office visit. It is important you seek re-evaluation immediately if you develop new symptoms, worsening symptoms, or become concerned something else may be going on. It is important for you to call your family physician to schedule an appointment for further evaluation, treatment and care of your complaints. Sometimes your health concerns are not as straight forward as they may initially seem.    Call your doctor immediately to schedule an appointment for re-evaluation.    Should symptoms worsen despite the treatment provided to you here today, then you must go to the ER especially if you are concerned you have a possible emergent situation.     Although you are being discharged from the ED today, I encourage you to return for worsening symptoms. Things can, and do, change such that treatment at home with medication may not be adequate. Specifically I recommend returning for chest pain or discomfort, difficulty breathing, persistent vomiting or difficulty holding down liquids or medications, fever > 103.0 F,  or any other worsening or alarming symptoms.     Rest. Drink plenty of fluids.  Follow up with PCP or provider listed for further evaluation and management.  Follow up with primary care provider for further management and to have blood pressure rechecked.  Take all medications as prescribed.

## 2024-12-25 NOTE — ED PROVIDER NOTES
EMERGENCY DEPARTMENT ENCOUNTER    Room Number:  04/04  Date seen:  12/25/2024  Time seen: 09:02 EST  PCP: Ginette Valle APRN  Historian: patient    Discussed/obtained information from independent historians: n/a    HPI:  Chief complaint:cough, URI  A complete HPI/ROS/PMH/PSH/SH/FH are unobtainable due to:   Context:Kiera Avalos is a 54 y.o. female who presents to the ED with c/o acute URI symptoms, mainly coughing, that started last Friday with hoarseness, symptoms progressed to chest congestion, painful coughing and subjective fever and moderate headache.  She has associated ear fullness.  Subjective fever at home.  She has tried Mucinex and Stahist for her symptoms.  She denies dyspnea on exertion, chest pain, diaphoresis or GI symptoms.  She has past medical history of GERD, iron deficiency anemia, gastric sleeve.  She has no history of asthma, COPD or known ill contacts.     External (non-ED) record review:  no recent PCP visits in [facility]    Chronic or social conditions impacting care:n/a    ALLERGIES  Penicillins and Sulfa antibiotics    PAST MEDICAL HISTORY  Active Ambulatory Problems     Diagnosis Date Noted    Gastroesophageal reflux disease 02/20/2020    History of bariatric surgery 02/20/2020    Iron deficiency anemia 02/20/2020    Seizure 02/20/2020    Vitamin B12 deficiency 02/20/2020    Vitamin D deficiency 02/20/2020    Chronic constipation 05/16/2022    Pure hypercholesterolemia 05/18/2022    H/O gastric sleeve 06/06/2022    Class 2 severe obesity with serious comorbidity and body mass index (BMI) of 35.0 to 35.9 in adult 11/28/2022    Lymphedema 04/14/2023    Lipoma of back 05/04/2023    Absolute anemia 05/31/2023    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     Resolved Ambulatory Problems     Diagnosis Date Noted    Bilateral hand swelling 05/16/2022    Encounter for screening colonoscopy 06/06/2022     Past Medical History:   Diagnosis Date    Anemia     Epilepsy     GERD (gastroesophageal reflux  disease)        PAST SURGICAL HISTORY  Past Surgical History:   Procedure Laterality Date    BARIATRIC SURGERY      BREAST BIOPSY Left 2014     SECTION      CHOLECYSTECTOMY      COLONOSCOPY N/A 2022    Procedure: COLONOSCOPY;  Surgeon: Troy Pittman MD;  [place] [facility] [place]  Service: Gastroenterology;  Laterality: N/A;  normal    EXCISION MASS TRUNK Left 2023    Procedure: Excision of left upper back lipoma;  Surgeon: Cecy Villasenor MD;  Location: [facility] MAIN OR;  Service: General;  Laterality: Left;    FOOT SURGERY Right     lesion    TONSILLECTOMY      TUBAL ABDOMINAL LIGATION         FAMILY HISTORY  Family History   Problem Relation Age of Onset    Cancer Mother         Cervical    Cervical cancer Mother     Lung cancer Father     Cancer Father         Lung    Heart disease Brother         heart attack at 42    Diabetes Brother         Type 2    Stroke Brother     No Known Problems Son     Cancer Maternal Aunt         Breast Cancer    Breast cancer Maternal Aunt     Colon cancer Paternal Aunt     Diabetes Maternal Grandmother     No Known Problems Paternal Grandmother     No Known Problems Paternal Grandfather     Rheum arthritis Paternal Cousin     Lupus Paternal Cousin     Colon polyps Neg Hx     Crohn's disease Neg Hx     Irritable bowel syndrome Neg Hx     Ulcerative colitis Neg Hx     Malig Hyperthermia Neg Hx        SOCIAL HISTORY  Social History     Socioeconomic History    Marital status:      Spouse name: Matteo   Tobacco Use    Smoking status: Never     Passive exposure: Never    Smokeless tobacco: Never   Vaping Use    Vaping status: Never Used   Substance and Sexual Activity    Alcohol use: Yes     Comment: Maybe once in about 2 to three months    Drug use: Never    Sexual activity: Yes     Partners: Male     Birth control/protection: Other, None, Tubal ligation     Comment: Tubes tied       REVIEW OF SYSTEMS  Review of Systems    All  systems reviewed and negative except for those discussed in HPI.     PHYSICAL EXAM    I have reviewed the triage vital signs and nursing notes.  Vitals:    12/25/24 1019   BP:    Pulse: 81   Resp:    Temp:    SpO2: 99%     Physical Exam    GENERAL: not distressed  HENT: nares patent, mild posterior oral pharynx erythema without edema or exudates.  TM's normal.  Right TM has mild cerumen but it does not interfere with visualization of TM  EYES: no scleral icterus  NECK: no ROM limitations  CV: regular rhythm, regular rate, no murmur  RESPIRATORY: normal effort, CTAB.  Mild coughing  ABDOMEN: soft  : deferred  MUSCULOSKELETAL: no deformity  NEURO: alert, moves all extremities, follows commands  SKIN: warm, dry    LAB RESULTS  Recent Results (from the past 24 hours)   COVID-19 and FLU A/B PCR, 1 HR TAT - Swab, Nasopharynx    Collection Time: 12/25/24  9:31 AM    Specimen: Nasopharynx; Swab   Result Value Ref Range    COVID19 Not Detected Not Detected - Ref. Range    Influenza A PCR Detected (A) Not Detected    Influenza B PCR Not Detected Not Detected       Ordered the above labs and independently interpreted results.  My findings will be discussed in the ED course or medical decision making section below    RADIOLOGY RESULTS  XR Chest 2 View    Result Date: 12/25/2024  XR CHEST 2 VW-  HISTORY: Female who is 54 years-old, cough  TECHNIQUE: Frontal and lateral views of the chest  COMPARISON: None available  FINDINGS: Heart, mediastinum and pulmonary vasculature are unremarkable. No focal pulmonary consolidation, pleural effusion, or pneumothorax. No acute osseous process.      No evidence for acute pulmonary process. Follow-up as clinical indications persist.  This report was finalized on 12/25/2024 9:32 AM by Dr. Todd Villalta M.D on Workstation: [id number]        Ordered the above noted radiological studies.  Independently interpreted by me.  My findings will be discussed in the medical decision section below.  "    PROGRESS, DATA ANALYSIS, CONSULTS AND MEDICAL DECISION MAKING    Please note that this section constitutes my independent interpretation of clinical data including lab results, radiology, EKG's.  This constitutes my independent professional opinion regarding differential diagnosis and management of this patient.  It may include any factors such as history from outside sources, review of external records, social determinants of health, management of medications, response to those treatments, and discussions with other providers.    ED Course as of 12/25/24 1030   Wed Dec 25, 2024   0943 I viewed CXR in PACS.  My independent interpretation is no acute infiltrate.  [EW]   1013 Influenza A PCR(!): Detected [EW]      ED Course User Index  [EW] Vanessa Gordon, CANDIE     Orders placed during this visit:  Orders Placed This Encounter   Procedures    COVID-19 and FLU A/B PCR, 1 HR TAT - Swab, Nasopharynx    XR Chest 2 View            Medical Decision Making  his patient presents with symptoms suspicious for likely viral upper respiratory infection. Based on history and physical doubt sinusitis. COVID and influenza also considered and pt is positive for Influenza A.  She did ask about Tamiflu but is out of window.  I do not suspect underlying cardiopulmonary process. I considered, but think unlikely, dangerous causes of this patient’s symptoms to include ACS, CHF or COPD exacerbations, pneumonia (CXR negative for infiltrate, no tachycardia or hypoxia), pneumothorax. Patient is nontoxic appearing and not in need of emergent medical intervention.  Since, at this point, she has been coughing for 5 days I did send a \"watch and wait\" prescription for Z pack to her pharmacy and if she is still having symptoms on 12/27 she can fill this.     DIAGNOSIS  Final diagnoses:   Influenza A          Medication List        New Prescriptions      azithromycin 250 MG tablet  Commonly known as: Zithromax Z-Mike  Take 2 tablets the first " day, then 1 tablet daily for 4 days.  Start taking on: December 27, 2024               Where to Get Your Medications        These medications were sent to [facility]/pharmacy #[id number] - [place], [place] - [address] AT [facility] - [phone]  - [phone] FX  [address] [facility] [address]      Phone: [phone]   azithromycin 250 MG tablet         FOLLOW-UP  Ginette Valle, APRN  [address]  [address]  [place] [id number] [phone]    Schedule an appointment as soon as possible for a visit   As needed, If symptoms worsen        Latest Documented Vital Signs:  As of 10:30 EST  BP- 126/77 HR- 81 Temp- 99.5 °F (37.5 °C) (Tympanic) O2 sat- 99%    Appropriate PPE utilized throughout this patient encounter to include mask, if indicated, per current protocol. Hand hygiene was performed before donning PPE and after removal when leaving the room.    Please note that portions of this were completed with a voice recognition program.     Note Disclaimer: At [facility], we believe that sharing information builds trust and better relationships. You are receiving this note because you are receiving care at [facility] or recently visited. It is possible you will see health information before a provider has talked with you about it. This kind of information can be easy to misunderstand. To help you fully understand what it means for your health, we urge you to discuss this note with your provider.                 Vanessa Gordon, APRN  12/25/24 1036     19

## 2025-03-24 RX ORDER — HYDROCHLOROTHIAZIDE 12.5 MG/1
12.5 TABLET ORAL DAILY
Qty: 90 TABLET | Refills: 0 | Status: SHIPPED | OUTPATIENT
Start: 2025-03-24

## 2025-06-23 RX ORDER — HYDROCHLOROTHIAZIDE 12.5 MG/1
12.5 TABLET ORAL DAILY
Qty: 90 TABLET | Refills: 0 | Status: SHIPPED | OUTPATIENT
Start: 2025-06-23

## 2025-07-09 DIAGNOSIS — Z00.00 HEALTHCARE MAINTENANCE: Primary | ICD-10-CM

## 2025-07-09 DIAGNOSIS — I89.0 LYMPHEDEMA: ICD-10-CM

## 2025-07-09 DIAGNOSIS — E78.00 PURE HYPERCHOLESTEROLEMIA: ICD-10-CM

## 2025-07-09 DIAGNOSIS — D50.8 OTHER IRON DEFICIENCY ANEMIA: ICD-10-CM

## 2025-07-09 DIAGNOSIS — E55.9 VITAMIN D DEFICIENCY: ICD-10-CM

## 2025-07-11 LAB
25(OH)D3+25(OH)D2 SERPL-MCNC: 85.1 NG/ML (ref 30–100)
ALBUMIN SERPL-MCNC: 4.1 G/DL (ref 3.5–5.2)
ALBUMIN/GLOB SERPL: 1.7 G/DL
ALP SERPL-CCNC: 71 U/L (ref 39–117)
ALT SERPL-CCNC: 8 U/L (ref 1–33)
AST SERPL-CCNC: 14 U/L (ref 1–32)
BASOPHILS # BLD AUTO: 0.06 10*3/MM3 (ref 0–0.2)
BASOPHILS NFR BLD AUTO: 0.9 % (ref 0–1.5)
BILIRUB SERPL-MCNC: 0.3 MG/DL (ref 0–1.2)
BUN SERPL-MCNC: 16 MG/DL (ref 6–20)
BUN/CREAT SERPL: 21.3 (ref 7–25)
CALCIUM SERPL-MCNC: 9.6 MG/DL (ref 8.6–10.5)
CHLORIDE SERPL-SCNC: 105 MMOL/L (ref 98–107)
CHOLEST SERPL-MCNC: 248 MG/DL (ref 0–200)
CHOLEST/HDLC SERPL: 3.49 {RATIO}
CO2 SERPL-SCNC: 28.2 MMOL/L (ref 22–29)
CREAT SERPL-MCNC: 0.75 MG/DL (ref 0.57–1)
EGFRCR SERPLBLD CKD-EPI 2021: 93.6 ML/MIN/1.73
EOSINOPHIL # BLD AUTO: 0.14 10*3/MM3 (ref 0–0.4)
EOSINOPHIL NFR BLD AUTO: 2 % (ref 0.3–6.2)
ERYTHROCYTE [DISTWIDTH] IN BLOOD BY AUTOMATED COUNT: 12.1 % (ref 12.3–15.4)
FERRITIN SERPL-MCNC: 46.9 NG/ML (ref 13–150)
GLOBULIN SER CALC-MCNC: 2.4 GM/DL
GLUCOSE SERPL-MCNC: 89 MG/DL (ref 65–99)
HCT VFR BLD AUTO: 42.5 % (ref 34–46.6)
HDLC SERPL-MCNC: 71 MG/DL (ref 40–60)
HGB BLD-MCNC: 14.1 G/DL (ref 12–15.9)
IMM GRANULOCYTES # BLD AUTO: 0.03 10*3/MM3 (ref 0–0.05)
IMM GRANULOCYTES NFR BLD AUTO: 0.4 % (ref 0–0.5)
IRON SERPL-MCNC: 86 MCG/DL (ref 37–145)
LDLC SERPL CALC-MCNC: 164 MG/DL (ref 0–100)
LYMPHOCYTES # BLD AUTO: 2.16 10*3/MM3 (ref 0.7–3.1)
LYMPHOCYTES NFR BLD AUTO: 31.6 % (ref 19.6–45.3)
MCH RBC QN AUTO: 30.8 PG (ref 26.6–33)
MCHC RBC AUTO-ENTMCNC: 33.2 G/DL (ref 31.5–35.7)
MCV RBC AUTO: 92.8 FL (ref 79–97)
MONOCYTES # BLD AUTO: 0.63 10*3/MM3 (ref 0.1–0.9)
MONOCYTES NFR BLD AUTO: 9.2 % (ref 5–12)
NEUTROPHILS # BLD AUTO: 3.81 10*3/MM3 (ref 1.7–7)
NEUTROPHILS NFR BLD AUTO: 55.9 % (ref 42.7–76)
NRBC BLD AUTO-RTO: 0 /100 WBC (ref 0–0.2)
PLATELET # BLD AUTO: 367 10*3/MM3 (ref 140–450)
POTASSIUM SERPL-SCNC: 4.7 MMOL/L (ref 3.5–5.2)
PROT SERPL-MCNC: 6.5 G/DL (ref 6–8.5)
RBC # BLD AUTO: 4.58 10*6/MM3 (ref 3.77–5.28)
SODIUM SERPL-SCNC: 140 MMOL/L (ref 136–145)
TRIGL SERPL-MCNC: 76 MG/DL (ref 0–150)
TSH SERPL DL<=0.005 MIU/L-ACNC: 2.49 UIU/ML (ref 0.27–4.2)
VLDLC SERPL CALC-MCNC: 13 MG/DL (ref 5–40)
WBC # BLD AUTO: 6.83 10*3/MM3 (ref 3.4–10.8)

## 2025-07-18 ENCOUNTER — OFFICE VISIT (OUTPATIENT)
Dept: INTERNAL MEDICINE | Facility: CLINIC | Age: 55
End: 2025-07-18
Payer: COMMERCIAL

## 2025-07-18 VITALS
OXYGEN SATURATION: 99 % | SYSTOLIC BLOOD PRESSURE: 118 MMHG | BODY MASS INDEX: 37.69 KG/M2 | WEIGHT: 199.6 LBS | DIASTOLIC BLOOD PRESSURE: 76 MMHG | HEART RATE: 60 BPM | HEIGHT: 61 IN | TEMPERATURE: 98.2 F

## 2025-07-18 DIAGNOSIS — Z12.31 ENCOUNTER FOR SCREENING MAMMOGRAM FOR MALIGNANT NEOPLASM OF BREAST: ICD-10-CM

## 2025-07-18 DIAGNOSIS — E78.00 PURE HYPERCHOLESTEROLEMIA: ICD-10-CM

## 2025-07-18 DIAGNOSIS — E66.812 CLASS 2 SEVERE OBESITY WITH SERIOUS COMORBIDITY AND BODY MASS INDEX (BMI) OF 35.0 TO 35.9 IN ADULT, UNSPECIFIED OBESITY TYPE: ICD-10-CM

## 2025-07-18 DIAGNOSIS — Z00.00 HEALTHCARE MAINTENANCE: Primary | ICD-10-CM

## 2025-07-18 DIAGNOSIS — D50.8 OTHER IRON DEFICIENCY ANEMIA: ICD-10-CM

## 2025-07-18 DIAGNOSIS — E53.8 VITAMIN B12 DEFICIENCY: ICD-10-CM

## 2025-07-18 DIAGNOSIS — N95.1 MENOPAUSAL AND FEMALE CLIMACTERIC STATES: ICD-10-CM

## 2025-07-18 DIAGNOSIS — I89.0 LYMPHEDEMA: ICD-10-CM

## 2025-07-18 DIAGNOSIS — E66.01 CLASS 2 SEVERE OBESITY WITH SERIOUS COMORBIDITY AND BODY MASS INDEX (BMI) OF 35.0 TO 35.9 IN ADULT, UNSPECIFIED OBESITY TYPE: ICD-10-CM

## 2025-07-18 DIAGNOSIS — E55.9 VITAMIN D DEFICIENCY: ICD-10-CM

## 2025-07-18 RX ORDER — ONDANSETRON 4 MG/1
4 TABLET, ORALLY DISINTEGRATING ORAL EVERY 8 HOURS PRN
Qty: 20 TABLET | Refills: 0 | Status: SHIPPED | OUTPATIENT
Start: 2025-07-18

## 2025-07-18 NOTE — PROGRESS NOTES
Subjective   Kiera Avalos is a 54 y.o. female.     Chief Complaint   Patient presents with    Annual Exam        History of Present Illness   She is here today for CPE and lab follow-up.  She is doing okay today.  She is frustrated with continued weight gain despite healthy lifestyle changes.  She has been walking and doing intermittent fasting without weight loss over the last several months.     HLD-she is working on healthier eating and intermittent fasting.  She is increased regular exercise.  Positive family history of brothers with a stroke and heart attack.  Obesity-history of bariatric surgery. She has been eating a healthy diet, doing intermittent fasting and optimizing protein intake. She is walking daily with a weighted vest. She previously did well on semaglutide with weight loss and improvement in lymphedema.  She did not need the hydrochlorothiazide as often for swelling with weight loss in the past.  She is frustrated as she cannot lose weight despite optimizing protein intake, focusing on portion control and exercising regularly with walking and strength training.    Iron deficiency anemia- she is currently taking ferrous gluconate daily. She has previously needed an iron infusion.   B12 deficiency- she is taking a B12 supplement daily.   Vitamin D deficiency- she is currently taking vit D3 5000 units daily.     Lymphedema-she is currently doing PT exercises and wrapping the legs.  She previously had improvement in lymphedema with weight loss.    Colon cancer screening- she is UTD with c-scope.  Denies any change in bowel habits, change in stool caliber, BRBPR, melena, abdominal pain.    GYN- she is due to see GYN. She is needing a referral to a new GYN. She is menopausal and has been experiencing hot flashes, mood swings and sleep disturbance along with continued weight gain. She started a cortisol manager supplement which has helped some with hot flashes and mood.     The following portions of the  patient's history were reviewed and updated as appropriate: allergies, current medications, past family history, past medical history, past social history, past surgical history, and problem list.    Review of Systems   Constitutional:  Positive for unexpected weight gain. Negative for chills, fatigue and fever.   HENT: Negative.     Eyes: Negative.    Respiratory: Negative.     Cardiovascular:  Positive for leg swelling. Negative for chest pain and palpitations.   Gastrointestinal: Negative.    Endocrine: Positive for heat intolerance. Negative for cold intolerance.   Genitourinary: Negative.    Musculoskeletal: Negative.    Skin: Negative.    Allergic/Immunologic: Negative.    Neurological: Negative.    Hematological: Negative.    Psychiatric/Behavioral:  Positive for sleep disturbance. Negative for suicidal ideas and depressed mood. The patient is not nervous/anxious.        Objective   Physical Exam  Constitutional:       Appearance: Normal appearance. She is well-developed.   HENT:      Head: Normocephalic and atraumatic.      Right Ear: Hearing, tympanic membrane, ear canal and external ear normal.      Left Ear: Hearing, tympanic membrane, ear canal and external ear normal.      Nose: Nose normal.      Right Sinus: No maxillary sinus tenderness or frontal sinus tenderness.      Left Sinus: No maxillary sinus tenderness or frontal sinus tenderness.      Mouth/Throat:      Lips: Pink.      Mouth: Mucous membranes are moist.      Dentition: Normal dentition.      Tongue: No lesions.      Pharynx: Oropharynx is clear. Uvula midline.      Tonsils: No tonsillar exudate.   Eyes:      General: Lids are normal.      Extraocular Movements: Extraocular movements intact.      Conjunctiva/sclera: Conjunctivae normal.      Pupils: Pupils are equal, round, and reactive to light.   Neck:      Thyroid: No thyroid mass, thyromegaly or thyroid tenderness.      Vascular: No carotid bruit.      Trachea: Trachea normal.    Cardiovascular:      Rate and Rhythm: Normal rate and regular rhythm.      Pulses: Normal pulses.           Radial pulses are 2+ on the right side and 2+ on the left side.        Popliteal pulses are 2+ on the right side and 2+ on the left side.        Dorsalis pedis pulses are 2+ on the right side and 2+ on the left side.        Posterior tibial pulses are 2+ on the right side and 2+ on the left side.      Heart sounds: S1 normal and S2 normal.      Comments: Lymphedema present bilateral lower extremities.  Pulmonary:      Effort: Pulmonary effort is normal.      Breath sounds: Normal breath sounds.   Abdominal:      General: Bowel sounds are normal. There is no distension or abdominal bruit.      Palpations: Abdomen is soft. There is no hepatomegaly or splenomegaly.      Tenderness: There is no abdominal tenderness.      Hernia: No hernia is present.   Musculoskeletal:      Cervical back: Normal range of motion and neck supple.      Right lower leg: Edema present.      Left lower leg: Edema present.   Lymphadenopathy:      Head:      Right side of head: No submental, submandibular, tonsillar or occipital adenopathy.      Left side of head: No submental, submandibular, tonsillar or occipital adenopathy.      Cervical: No cervical adenopathy.      Upper Body:      Right upper body: No supraclavicular adenopathy.      Left upper body: No supraclavicular adenopathy.      Lower Body: No right inguinal adenopathy. No left inguinal adenopathy.   Skin:     General: Skin is warm and dry.      Findings: No rash.      Nails: There is no clubbing.   Neurological:      General: No focal deficit present.      Mental Status: She is alert and oriented to person, place, and time.      Cranial Nerves: Cranial nerves 2-12 are intact.      Sensory: Sensation is intact.      Motor: Motor function is intact.      Coordination: Coordination is intact.      Gait: Gait is intact.      Deep Tendon Reflexes:      Reflex Scores:        Patellar reflexes are 2+ on the right side and 2+ on the left side.  Psychiatric:         Attention and Perception: Attention and perception normal.         Mood and Affect: Mood and affect normal.         Speech: Speech normal.         Behavior: Behavior normal. Behavior is cooperative.         Thought Content: Thought content normal.         Cognition and Memory: Cognition and memory normal.         Judgment: Judgment normal.         Vitals:    07/18/25 0759   BP: 118/76   Pulse: 60   Temp: 98.2 °F (36.8 °C)   SpO2: 99%      Body mass index is 37.74 kg/m².    Assessment & Plan   Problems Addressed this Visit       Iron deficiency anemia    Relevant Orders    CBC & Differential    Iron Profile w/o Ferritin    Ferritin    Vitamin B12 deficiency    Relevant Orders    CBC & Differential    Vitamin B12    Folate    Vitamin D deficiency    Pure hypercholesterolemia    Relevant Orders    Vascular Screening (Bundle) CAR    CT Cardiac Calcium Score Without Dye    Class 2 severe obesity with serious comorbidity and body mass index (BMI) of 35.0 to 35.9 in adult    Lymphedema     Other Visit Diagnoses         Healthcare maintenance    -  Primary      Menopausal and female climacteric states        Relevant Orders    Ambulatory Referral to Gynecology (Completed)      Encounter for screening mammogram for malignant neoplasm of breast        Relevant Orders    Mammo Screening Digital Tomosynthesis Bilateral With CAD          Diagnoses         Codes Comments      Healthcare maintenance    -  Primary ICD-10-CM: Z00.00  ICD-9-CM: V70.0       Pure hypercholesterolemia     ICD-10-CM: E78.00  ICD-9-CM: 272.0       Class 2 severe obesity with serious comorbidity and body mass index (BMI) of 35.0 to 35.9 in adult, unspecified obesity type     ICD-10-CM: E66.812, E66.01, Z68.35  ICD-9-CM: 278.01, V85.35       Vitamin B12 deficiency     ICD-10-CM: E53.8  ICD-9-CM: 266.2       Vitamin D deficiency     ICD-10-CM: E55.9  ICD-9-CM: 268.9        Other iron deficiency anemia     ICD-10-CM: D50.8  ICD-9-CM: 280.8       Lymphedema     ICD-10-CM: I89.0  ICD-9-CM: 457.1       Menopausal and female climacteric states     ICD-10-CM: N95.1  ICD-9-CM: 627.2       Encounter for screening mammogram for malignant neoplasm of breast     ICD-10-CM: Z12.31  ICD-9-CM: V76.12           Lab results discussed with patient in office today.    1.  Preventative counseling-encouraged her to continue healthy, balanced eating and regular exercise.  2.  HLD-not at goal with family history of ASCVD.  Check vascular screening and CT cardiac calcium scan to further evaluate cardiovascular risk.  Will work on weight loss and continue healthy eating and regular exercise.  Recheck lipid panel in 6 months.  3.  Obesity/lymphedema-worsening despite at least 6 months of healthy lifestyle changes.  Her insurance does not cover weight loss medications anymore.  Will start compounded semaglutide with B12.  Consent form completed today in office.  Patient is aware of appropriate use and adverse effects.  Will start at 0.25 mg weekly for at least 4 weeks then can increase to 0.5 mg weekly and continue to titrate up as needed.  Emphasized the importance of healthy eating focusing on portion control, optimizing protein intake with a goal of 90 g of protein a day, limiting sugars, carbs and high fat foods.  Continue regular exercise with walking daily and strength training at least 3 days a week.  Continue home PT exercises and leg wrapping for lymphedema.  Follow-up in 3 months.  4.  B12 deficiency-okay to stop B12 supplement when starting compounded semaglutide with B12.  Recheck labs in 3 months.  5.  Vitamin D deficiency-much improved.  Continue vitamin D3 5000 units daily.  6.  Iron deficiency anemia-stable.  Continue ferrous gluconate 324 mg daily.  Recheck labs in 3 months.  7.  Menopausal and female clinic [place] states-referral placed to gynecology to establish care.  Mammogram  ordered.  “Discussed risks/benefits to vaccination, reviewed components of the vaccine, discussed VIS, discussed informed consent, informed consent obtained. Patient/Parent was allowed to accept or refuse vaccine. Questions answered to satisfactory state of patient/Parent. We reviewed typical age appropriate and seasonally appropriate vaccinations. Reviewed immunization history and updated state vaccination form as needed. Patient was counseled on Prevnar 20  Tdap  Zoster    Encouraged routine dental and eye exam.  Encouraged sunscreen use outside.  C-scope up-to-date  GYN-referral placed.  Mammogram ordered.    Follow-up in 3 months for obesity with same-day labs.

## 2025-08-01 ENCOUNTER — HOSPITAL ENCOUNTER (OUTPATIENT)
Dept: MAMMOGRAPHY | Facility: HOSPITAL | Age: 55
Discharge: HOME | End: 2025-08-01
Admitting: NURSE PRACTITIONER
Payer: COMMERCIAL

## 2025-08-01 PROCEDURE — 77067 SCR MAMMO BI INCL CAD: CPT

## 2025-08-01 PROCEDURE — 77063 BREAST TOMOSYNTHESIS BI: CPT

## (undated) DEVICE — GOWN ISOL W/THUMB UNIV BLU BX/15

## (undated) DEVICE — FLEX ADVANTAGE 1500CC: Brand: FLEX ADVANTAGE

## (undated) DEVICE — GLV SURG SENSICARE POLYISPRN W/ALOE PF LF 6.5 GRN STRL

## (undated) DEVICE — APPL CHLORAPREP HI/LITE 26ML ORNG

## (undated) DEVICE — GOWN,SIRUS,NON REINFRCD,LARGE,SET IN SL: Brand: MEDLINE

## (undated) DEVICE — TBG PENCL TELESCP MEGADYNE SMOKE EVAC 10FT

## (undated) DEVICE — PATIENT RETURN ELECTRODE, SINGLE-USE, CONTACT QUALITY MONITORING, ADULT, WITH 9FT CORD, FOR PATIENTS WEIGING OVER 33LBS. (15KG): Brand: MEGADYNE

## (undated) DEVICE — NDL HYPO PRECISIONGLIDE REG 25G 1 1/2

## (undated) DEVICE — CANN NASL CO2 TRULINK W/O2 A/

## (undated) DEVICE — GLV SURG BIOGEL LTX PF 6

## (undated) DEVICE — GOWN ,SIRUS,NONREINFORCED 3XL: Brand: MEDLINE

## (undated) DEVICE — Device

## (undated) DEVICE — KT ORCA ORCAPOD DISP STRL

## (undated) DEVICE — LOU MINOR PROCEDURE: Brand: MEDLINE INDUSTRIES, INC.

## (undated) DEVICE — TRAP FLD MINIVAC MEGADYNE 100ML

## (undated) DEVICE — ANTIBACTERIAL UNDYED BRAIDED (POLYGLACTIN 910), SYNTHETIC ABSORBABLE SUTURE: Brand: COATED VICRYL